# Patient Record
Sex: FEMALE | Race: WHITE | Employment: OTHER | URBAN - METROPOLITAN AREA
[De-identification: names, ages, dates, MRNs, and addresses within clinical notes are randomized per-mention and may not be internally consistent; named-entity substitution may affect disease eponyms.]

---

## 2017-09-22 ENCOUNTER — HOSPITAL ENCOUNTER (OUTPATIENT)
Dept: OTHER | Age: 52
Discharge: HOME | End: 2017-09-22
Attending: PHYSICAL MEDICINE & REHABILITATION | Admitting: PHYSICAL MEDICINE & REHABILITATION

## 2017-09-22 RX ORDER — ATORVASTATIN CALCIUM 80 MG/1
80 TABLET, FILM COATED ORAL NIGHTLY
Status: DISCONTINUED | OUTPATIENT
Start: 2017-09-22 | End: 2017-09-23 | Stop reason: CLARIF

## 2017-09-22 RX ORDER — ALPRAZOLAM 1 MG/1
1 TABLET ORAL 4 TIMES DAILY PRN
Status: DISCONTINUED | OUTPATIENT
Start: 2017-09-22 | End: 2017-09-23 | Stop reason: CLARIF

## 2017-09-22 RX ORDER — ALBUTEROL SULFATE 90 UG/1
2 AEROSOL, METERED RESPIRATORY (INHALATION) EVERY 6 HOURS PRN
Status: DISCONTINUED | OUTPATIENT
Start: 2017-09-22 | End: 2017-09-23 | Stop reason: CLARIF

## 2017-09-22 RX ORDER — ASPIRIN 81 MG/1
81 TABLET, CHEWABLE ORAL DAILY
Status: DISCONTINUED | OUTPATIENT
Start: 2017-09-22 | End: 2017-09-23 | Stop reason: CLARIF

## 2017-09-22 RX ORDER — CLOPIDOGREL BISULFATE 75 MG/1
75 TABLET ORAL DAILY
Status: DISCONTINUED | OUTPATIENT
Start: 2017-09-22 | End: 2017-09-23 | Stop reason: CLARIF

## 2017-09-22 RX ORDER — ACETAMINOPHEN 325 MG/1
650 TABLET ORAL EVERY 4 HOURS PRN
Status: DISCONTINUED | OUTPATIENT
Start: 2017-09-22 | End: 2017-09-23 | Stop reason: CLARIF

## 2017-09-22 RX ORDER — NICOTINE 21 MG/24HR
1 PATCH, TRANSDERMAL 24 HOURS TRANSDERMAL DAILY
Status: DISCONTINUED | OUTPATIENT
Start: 2017-09-22 | End: 2017-09-23 | Stop reason: CLARIF

## 2017-09-22 RX ORDER — HYDROCODONE BITARTRATE AND ACETAMINOPHEN 10; 325 MG/1; MG/1
1 TABLET ORAL EVERY 4 HOURS PRN
Status: DISCONTINUED | OUTPATIENT
Start: 2017-09-22 | End: 2017-09-23 | Stop reason: CLARIF

## 2017-09-22 RX ORDER — ONDANSETRON 8 MG/1
8 TABLET, ORALLY DISINTEGRATING ORAL EVERY 8 HOURS PRN
Status: DISCONTINUED | OUTPATIENT
Start: 2017-09-22 | End: 2017-09-23 | Stop reason: CLARIF

## 2017-10-05 PROBLEM — I63.50 CEREBRAL ARTERY OCCLUSION WITH CEREBRAL INFARCTION (HCC): Status: ACTIVE | Noted: 2017-10-05

## 2017-10-05 PROBLEM — R79.89 DECREASED THYROID STIMULATING HORMONE (TSH) LEVEL: Status: ACTIVE | Noted: 2017-10-05

## 2017-10-05 PROBLEM — G45.8 BRACHIAL-BASILAR INSUFFICIENCY SYNDROME: Status: ACTIVE | Noted: 2017-09-21

## 2017-10-05 PROBLEM — E78.1 HYPERTRIGLYCERIDEMIA: Status: ACTIVE | Noted: 2017-10-05

## 2017-10-05 PROBLEM — R63.0 ANOREXIA: Status: ACTIVE | Noted: 2017-10-05

## 2017-10-05 PROBLEM — M19.90 ARTHRITIS: Status: ACTIVE | Noted: 2017-10-05

## 2017-10-05 PROBLEM — F41.0 EPISODIC PAROXYSMAL ANXIETY DISORDER: Status: ACTIVE | Noted: 2017-10-05

## 2018-12-03 ENCOUNTER — APPOINTMENT (OUTPATIENT)
Dept: CT IMAGING | Age: 53
End: 2018-12-03
Payer: COMMERCIAL

## 2018-12-03 ENCOUNTER — APPOINTMENT (OUTPATIENT)
Dept: GENERAL RADIOLOGY | Age: 53
End: 2018-12-03
Payer: COMMERCIAL

## 2018-12-03 ENCOUNTER — HOSPITAL ENCOUNTER (EMERGENCY)
Age: 53
Discharge: ANOTHER HOSPITAL | End: 2018-12-03
Attending: EMERGENCY MEDICINE
Payer: COMMERCIAL

## 2018-12-03 VITALS
SYSTOLIC BLOOD PRESSURE: 202 MMHG | BODY MASS INDEX: 22.31 KG/M2 | HEART RATE: 82 BPM | DIASTOLIC BLOOD PRESSURE: 125 MMHG | RESPIRATION RATE: 22 BRPM | TEMPERATURE: 98 F | WEIGHT: 120 LBS | OXYGEN SATURATION: 100 %

## 2018-12-03 DIAGNOSIS — G91.9 HYDROCEPHALUS (HCC): ICD-10-CM

## 2018-12-03 DIAGNOSIS — R53.1 LEFT-SIDED WEAKNESS: ICD-10-CM

## 2018-12-03 DIAGNOSIS — I16.0 HYPERTENSIVE URGENCY: ICD-10-CM

## 2018-12-03 DIAGNOSIS — G93.40 ACUTE ENCEPHALOPATHY: Primary | ICD-10-CM

## 2018-12-03 LAB
A/G RATIO: 1 (ref 1.1–2.2)
ALBUMIN SERPL-MCNC: 4 G/DL (ref 3.4–5)
ALP BLD-CCNC: 182 U/L (ref 40–129)
ALT SERPL-CCNC: 14 U/L (ref 10–40)
AMPHETAMINE SCREEN, URINE: ABNORMAL
ANION GAP SERPL CALCULATED.3IONS-SCNC: 16 MMOL/L (ref 3–16)
AST SERPL-CCNC: 22 U/L (ref 15–37)
BACTERIA: ABNORMAL /HPF
BARBITURATE SCREEN URINE: ABNORMAL
BASE EXCESS VENOUS: -0.4 MMOL/L (ref -3–3)
BASOPHILS ABSOLUTE: 0.2 K/UL (ref 0–0.2)
BASOPHILS RELATIVE PERCENT: 0.8 %
BENZODIAZEPINE SCREEN, URINE: POSITIVE
BILIRUB SERPL-MCNC: 0.5 MG/DL (ref 0–1)
BILIRUBIN URINE: NEGATIVE
BLOOD, URINE: ABNORMAL
BUN BLDV-MCNC: 16 MG/DL (ref 7–20)
CALCIUM SERPL-MCNC: 9.7 MG/DL (ref 8.3–10.6)
CANNABINOID SCREEN URINE: POSITIVE
CARBOXYHEMOGLOBIN: 4.3 % (ref 0–1.5)
CHLORIDE BLD-SCNC: 99 MMOL/L (ref 99–110)
CLARITY: CLEAR
CO2: 24 MMOL/L (ref 21–32)
COCAINE METABOLITE SCREEN URINE: ABNORMAL
COLOR: YELLOW
CREAT SERPL-MCNC: 0.6 MG/DL (ref 0.6–1.1)
EKG ATRIAL RATE: 77 BPM
EKG DIAGNOSIS: NORMAL
EKG P AXIS: 78 DEGREES
EKG P-R INTERVAL: 128 MS
EKG Q-T INTERVAL: 424 MS
EKG QRS DURATION: 88 MS
EKG QTC CALCULATION (BAZETT): 479 MS
EKG R AXIS: 85 DEGREES
EKG T AXIS: 84 DEGREES
EKG VENTRICULAR RATE: 77 BPM
EOSINOPHILS ABSOLUTE: 0.7 K/UL (ref 0–0.6)
EOSINOPHILS RELATIVE PERCENT: 2.2 %
GFR AFRICAN AMERICAN: >60
GFR NON-AFRICAN AMERICAN: >60
GLOBULIN: 4.2 G/DL
GLUCOSE BLD-MCNC: 218 MG/DL (ref 70–99)
GLUCOSE URINE: 250 MG/DL
HCO3 VENOUS: 22.9 MMOL/L (ref 23–29)
HCT VFR BLD CALC: 50.9 % (ref 36–48)
HEMOGLOBIN: 16.6 G/DL (ref 12–16)
INR BLD: 1.18 (ref 0.86–1.14)
KETONES, URINE: NEGATIVE MG/DL
LACTIC ACID: 1.6 MMOL/L (ref 0.4–2)
LEUKOCYTE ESTERASE, URINE: NEGATIVE
LYMPHOCYTES ABSOLUTE: 2.4 K/UL (ref 1–5.1)
LYMPHOCYTES RELATIVE PERCENT: 7.6 %
Lab: ABNORMAL
MCH RBC QN AUTO: 28.7 PG (ref 26–34)
MCHC RBC AUTO-ENTMCNC: 32.7 G/DL (ref 31–36)
MCV RBC AUTO: 87.9 FL (ref 80–100)
METHADONE SCREEN, URINE: ABNORMAL
METHEMOGLOBIN VENOUS: 0.4 %
MICROSCOPIC EXAMINATION: YES
MONOCYTES ABSOLUTE: 1.1 K/UL (ref 0–1.3)
MONOCYTES RELATIVE PERCENT: 3.5 %
NEUTROPHILS ABSOLUTE: 26.6 K/UL (ref 1.7–7.7)
NEUTROPHILS RELATIVE PERCENT: 85.9 %
NITRITE, URINE: NEGATIVE
O2 CONTENT, VEN: 23 VOL %
O2 SAT, VEN: 98 %
O2 THERAPY: ABNORMAL
OPIATE SCREEN URINE: POSITIVE
OXYCODONE URINE: ABNORMAL
PCO2, VEN: 34.3 MMHG (ref 40–50)
PDW BLD-RTO: 15 % (ref 12.4–15.4)
PH UA: 8
PH UA: 8
PH VENOUS: 7.44 (ref 7.35–7.45)
PHENCYCLIDINE SCREEN URINE: ABNORMAL
PLATELET # BLD: 252 K/UL (ref 135–450)
PMV BLD AUTO: 9.5 FL (ref 5–10.5)
PO2, VEN: 103.3 MMHG (ref 25–40)
POTASSIUM SERPL-SCNC: 3.4 MMOL/L (ref 3.5–5.1)
PROPOXYPHENE SCREEN: ABNORMAL
PROTEIN UA: >=300 MG/DL
PROTHROMBIN TIME: 13.5 SEC (ref 9.8–13)
RBC # BLD: 5.79 M/UL (ref 4–5.2)
RBC UA: >100 /HPF (ref 0–2)
SODIUM BLD-SCNC: 139 MMOL/L (ref 136–145)
SPECIFIC GRAVITY UA: 1.02
SPECIMEN STATUS: NORMAL
TCO2 CALC VENOUS: 24 MMOL/L
TOTAL PROTEIN: 8.2 G/DL (ref 6.4–8.2)
TROPONIN: <0.01 NG/ML
URINE REFLEX TO CULTURE: YES
URINE TYPE: ABNORMAL
UROBILINOGEN, URINE: 0.2 E.U./DL
WBC # BLD: 31 K/UL (ref 4–11)
WBC UA: ABNORMAL /HPF (ref 0–5)

## 2018-12-03 PROCEDURE — 87077 CULTURE AEROBIC IDENTIFY: CPT

## 2018-12-03 PROCEDURE — 6360000002 HC RX W HCPCS: Performed by: EMERGENCY MEDICINE

## 2018-12-03 PROCEDURE — 82803 BLOOD GASES ANY COMBINATION: CPT

## 2018-12-03 PROCEDURE — 71045 X-RAY EXAM CHEST 1 VIEW: CPT

## 2018-12-03 PROCEDURE — 70498 CT ANGIOGRAPHY NECK: CPT

## 2018-12-03 PROCEDURE — 94002 VENT MGMT INPAT INIT DAY: CPT

## 2018-12-03 PROCEDURE — 2580000003 HC RX 258: Performed by: EMERGENCY MEDICINE

## 2018-12-03 PROCEDURE — 70450 CT HEAD/BRAIN W/O DYE: CPT

## 2018-12-03 PROCEDURE — 93005 ELECTROCARDIOGRAM TRACING: CPT | Performed by: EMERGENCY MEDICINE

## 2018-12-03 PROCEDURE — 2700000000 HC OXYGEN THERAPY PER DAY

## 2018-12-03 PROCEDURE — 96365 THER/PROPH/DIAG IV INF INIT: CPT

## 2018-12-03 PROCEDURE — 6360000004 HC RX CONTRAST MEDICATION: Performed by: EMERGENCY MEDICINE

## 2018-12-03 PROCEDURE — 94761 N-INVAS EAR/PLS OXIMETRY MLT: CPT

## 2018-12-03 PROCEDURE — 83605 ASSAY OF LACTIC ACID: CPT

## 2018-12-03 PROCEDURE — 85025 COMPLETE CBC W/AUTO DIFF WBC: CPT

## 2018-12-03 PROCEDURE — 80053 COMPREHEN METABOLIC PANEL: CPT

## 2018-12-03 PROCEDURE — 80307 DRUG TEST PRSMV CHEM ANLYZR: CPT

## 2018-12-03 PROCEDURE — 84484 ASSAY OF TROPONIN QUANT: CPT

## 2018-12-03 PROCEDURE — 87086 URINE CULTURE/COLONY COUNT: CPT

## 2018-12-03 PROCEDURE — 72125 CT NECK SPINE W/O DYE: CPT

## 2018-12-03 PROCEDURE — 99291 CRITICAL CARE FIRST HOUR: CPT

## 2018-12-03 PROCEDURE — 87186 SC STD MICRODIL/AGAR DIL: CPT

## 2018-12-03 PROCEDURE — 96375 TX/PRO/DX INJ NEW DRUG ADDON: CPT

## 2018-12-03 PROCEDURE — 94770 HC ETCO2 MONITOR DAILY: CPT

## 2018-12-03 PROCEDURE — 70496 CT ANGIOGRAPHY HEAD: CPT

## 2018-12-03 PROCEDURE — 93010 ELECTROCARDIOGRAM REPORT: CPT | Performed by: INTERNAL MEDICINE

## 2018-12-03 PROCEDURE — 81001 URINALYSIS AUTO W/SCOPE: CPT

## 2018-12-03 PROCEDURE — 85610 PROTHROMBIN TIME: CPT

## 2018-12-03 PROCEDURE — 4500000026 HC ED CRITICAL CARE PROCEDURE

## 2018-12-03 PROCEDURE — 2500000003 HC RX 250 WO HCPCS: Performed by: EMERGENCY MEDICINE

## 2018-12-03 RX ORDER — LABETALOL HYDROCHLORIDE 5 MG/ML
10 INJECTION, SOLUTION INTRAVENOUS ONCE
Status: COMPLETED | OUTPATIENT
Start: 2018-12-03 | End: 2018-12-03

## 2018-12-03 RX ORDER — 0.9 % SODIUM CHLORIDE 0.9 %
1000 INTRAVENOUS SOLUTION INTRAVENOUS ONCE
Status: COMPLETED | OUTPATIENT
Start: 2018-12-03 | End: 2018-12-03

## 2018-12-03 RX ORDER — METOPROLOL TARTRATE 5 MG/5ML
10 INJECTION INTRAVENOUS ONCE
Status: COMPLETED | OUTPATIENT
Start: 2018-12-03 | End: 2018-12-03

## 2018-12-03 RX ORDER — ETOMIDATE 2 MG/ML
10 INJECTION INTRAVENOUS ONCE
Status: COMPLETED | OUTPATIENT
Start: 2018-12-03 | End: 2018-12-03

## 2018-12-03 RX ORDER — SUCCINYLCHOLINE CHLORIDE 20 MG/ML
80 INJECTION INTRAMUSCULAR; INTRAVENOUS ONCE
Status: COMPLETED | OUTPATIENT
Start: 2018-12-03 | End: 2018-12-03

## 2018-12-03 RX ADMIN — SUCCINYLCHOLINE CHLORIDE 80 MG: 20 INJECTION, SOLUTION INTRAMUSCULAR; INTRAVENOUS; PARENTERAL at 10:31

## 2018-12-03 RX ADMIN — VANCOMYCIN HYDROCHLORIDE 1000 MG: 1 INJECTION, POWDER, LYOPHILIZED, FOR SOLUTION INTRAVENOUS at 10:45

## 2018-12-03 RX ADMIN — LABETALOL HYDROCHLORIDE 10 MG: 5 INJECTION, SOLUTION INTRAVENOUS at 09:44

## 2018-12-03 RX ADMIN — SODIUM CHLORIDE 1000 ML: 9 INJECTION, SOLUTION INTRAVENOUS at 10:45

## 2018-12-03 RX ADMIN — ETOMIDATE 10 MG: 20 INJECTION, SOLUTION INTRAVENOUS at 10:31

## 2018-12-03 RX ADMIN — METOPROLOL TARTRATE 10 MG: 5 INJECTION, SOLUTION INTRAVENOUS at 11:34

## 2018-12-03 RX ADMIN — IOPAMIDOL 75 ML: 755 INJECTION, SOLUTION INTRAVENOUS at 09:34

## 2018-12-03 ASSESSMENT — PULMONARY FUNCTION TESTS: PIF_VALUE: 16

## 2018-12-03 NOTE — ED PROVIDER NOTES
1b. LOC questions:  1=Performs one task correctly   1c. LOC commands: 0=Performs both tasks correctly   2. Best Gaze: 1=partial gaze palsy   3. Visual: Unable to assess   4. Facial Palsy: 0=Normal symmetric movement   5a. Motor left arm: 3=No effort against gravity, limb falls   5b. Motor right arm: 2=Some effort against gravity, limb cannot get to or maintain (if cured) 90 (or 45) degrees, drifts down to bed, but has some effort against gravity   6a. motor left leg: 3=No effort against gravity, limb falls   6b  Motor right le=Drift, limb holds 90 (or 45) degrees but drifts down before full 10 seconds: does not hit bed   7. Limb Ataxia: Unable to assess   8. Sensory: Unable to assess   9. Best Language:  Unable to assess   10. Dysarthria: 1=Mild to moderate, patient slurs at least some words and at worst, can be understood with some difficulty   11. Extinction and Inattention: 0=No abnormality   12. Distal motor function: 0=Normal    Total:  13     DERMATOLOGIC: No petechiae, rashes, or ecchymoses. No erythema. PSYCH: normal mood and affect. Normal thought content. ED COURSE AND MEDICAL DECISION MAKING:      EKG as interpreted by myself:  normal sinus rhythm with a rate of 77  Axis is   Normal  QTc is  479ms  Intervals and Durations are unremarkable. No specific ST-T wave changes appreciated. No evidence of acute ischemia. No prior EKG    Radiology:  Films have been read by radiologist as noted in chart unless otherwise stated. Other radiologic studies (i.e. CT, MRI, ultrasounds, etc ) have been interpreted by radiologist.     CTA HEAD W CONTRAST   Preliminary Result   There is atherosclerotic disease and near complete occlusion of the left   common carotid artery with a high-grade stenosis at the origin. The left   common carotid artery is irregular and narrowed throughout its visualized   course with a very high-grade stenosis or near occlusion in the region of the   bulb.  There is a very aortic arch. There is atherosclerotic disease of the aortic arch. There is atherosclerotic disease with narrowing of the origin of the left subclavian artery. There appears to be occlusion or near complete occlusion of the left subclavian artery prior to the origin of the left vertebral artery. The right innominate artery is patent. CAROTID ARTERIES: The common carotid artery on the right is patent without any significant narrowing or stenosis. There is mild atherosclerotic calcification in the region of the right internal carotid artery bulb without any significant stenosis using NASCET criteria. The right internal carotid artery is patent through the skull base. There appears to be atherosclerotic disease at the origin of the left common carotid artery with a high-grade stenosis at the origin. The left common carotid artery is tiny throughout its visualized course and narrowed and appears to be near completely occluded in the region of the bulb. There is a very string of flow distal to the bulb that likely represents the distal left internal carotid artery. VERTEBRAL ARTERIES: The vertebral arteries are both patent in their visualized course and large bilaterally without any significant narrowing or stenosis. SOFT TISSUES:  There are emphysematous changes of the lung apices. There is mild spiculation in the left lung apex which may represent scarring. Correlation with prior studies that the patient has might be useful to exclude another etiology. BONES: There are degenerative changes in the cervical spine at C5-6 and C6-7. Intracranial circulation: The distal vertebral arteries and basilar artery are patent. The posterior cerebral arteries are patent without any significant narrowing or stenosis. The distal right internal carotid artery is visualized and patent in its visualized course. The distal left internal carotid artery is faintly visualized and is very narrowed.  The left middle cerebral artery is patent in its visualized course without any obvious narrowing or stenosis. The right middle cerebral artery is patent in its proximal course. There is some tapering of the distal right M3 branches consistent with a previous area of injury or infarct. The anterior cerebral arteries are patent bilaterally. The anterior communicating artery is patent. No obvious aneurysms are identified. The results were called and discussed with Dr. Sobia Keller at the time of interpretation. There is atherosclerotic disease and near complete occlusion of the left common carotid artery with a high-grade stenosis at the origin. The left common carotid artery is irregular and narrowed throughout its visualized course with a very high-grade stenosis or near occlusion in the region of the bulb. There is a very thin faintly visualized internal carotid artery on the left. There is atherosclerotic disease at the origin of the left subclavian artery with a near complete occlusion of the left subclavian prior to the origin of the vertebral artery. The distal left internal carotid artery is very narrowed and faintly visualized its course into the skull base. There is tapering of the distal M3 branches of the right middle cerebral artery which is consistent with the patient's known infarct. No other significant abnormality of the intracranial circulation. Cta Head W Contrast    Result Date: 12/3/2018  EXAMINATION: CTA OF THE NECK; CTA OF THE HEAD WITH CONTRAST 12/3/2018 9:35 am TECHNIQUE: CTA of the neck was performed with the administration of intravenous contrast. Multiplanar reformatted images are provided for review. MIP images are provided for review. Stenosis of the internal carotid arteries measured using NASCET criteria.  Dose modulation, iterative reconstruction, and/or weight based adjustment of the mA/kV was utilized to reduce the radiation dose to as low as reasonably achievable.; CTA of the head/brain was performed with the [place]ugio 23 Not Established VOL %    O2 Therapy Unknown    Protime-INR   Result Value Ref Range    Protime 13.5 (H) 9.8 - 13.0 sec    INR 1.18 (H) 0.86 - 1.14   Urinalysis Reflex to Culture   Result Value Ref Range    Color, UA Yellow Straw/Yellow    Clarity, UA Clear Clear    Glucose, Ur 250 (A) Negative mg/dL    Bilirubin Urine Negative Negative    Ketones, Urine Negative Negative mg/dL    Specific Gravity, UA 1.020 1.005 - 1.030    Blood, Urine LARGE (A) Negative    pH, UA 8.0 5.0 - 8.0    Protein, UA >=300 (A) Negative mg/dL    Urobilinogen, Urine 0.2 <2.0 E.U./dL    Nitrite, Urine Negative Negative    Leukocyte Esterase, Urine Negative Negative    Microscopic Examination YES     Urine Reflex to Culture Yes     Urine Type Not Specified    Lactic Acid, Plasma   Result Value Ref Range    Lactic Acid 1.6 0.4 - 2.0 mmol/L   Sample possible blood bank testing   Result Value Ref Range    Specimen Status [facility]    Urine Drug Screen   Result Value Ref Range    Amphetamine Screen, Urine Neg Negative <1000ng/mL    Barbiturate Screen, Ur Neg Negative <200 ng/mL    Benzodiazepine Screen, Urine POSITIVE (A) Negative <200 ng/mL    Cannabinoid Scrn, Ur POSITIVE (A) Negative <50 ng/mL    Cocaine Metabolite Screen, Urine Neg Negative <300 ng/mL    Opiate Scrn, Ur POSITIVE (A) Negative <300 ng/mL    PCP Screen, Urine Neg Negative <25 ng/mL    Methadone Screen, Urine Neg Negative <300 ng/mL    Propoxyphene Scrn, Ur Neg Negative <300 ng/mL    pH, UA 8.0     Drug Screen Comment: see below     Oxycodone Urine Neg Negative <100 ng/ml   Microscopic Urinalysis   Result Value Ref Range    WBC, UA 10-20 (A) 0 - 5 /HPF    RBC, UA >100 (A) 0 - 2 /HPF    Bacteria, UA 1+ (A) /HPF   EKG 12 Lead   Result Value Ref Range    Ventricular Rate 77 BPM    Atrial Rate 77 BPM    P-R Interval 128 ms    QRS Duration 88 ms    Q-T Interval 424 ms    QTc Calculation (Bazett) 479 ms    P Axis 78 degrees    R Axis 85 degrees    T Axis 84 degrees    Diagnosis Sinus rhythm with marked sinus arrhythmiaOtherwise normal ECGWhen compared with ECG of 10-NOV-2014 07:06,Fusion complexes are no longer Present       Treatment in the department:  Patient received the following while in the ED. Medications   0.9 % sodium chloride bolus (1,000 mLs Intravenous New Bag 12/3/18 1045)   vancomycin 1000 mg IVPB in 250 mL D5W addavial (1,000 mg Intravenous New Bag 12/3/18 1045)   cefepime (MAXIPIME) 2 g IVPB minibag (not administered)   labetalol (NORMODYNE;TRANDATE) injection 10 mg (10 mg Intravenous Given 12/3/18 0944)   iopamidol (ISOVUE-370) 76 % injection 75 mL (75 mLs Intravenous Given 12/3/18 0934)   etomidate (AMIDATE) injection 10 mg (10 mg Intravenous Given 12/3/18 1031)   succinylcholine (ANECTINE) injection 80 mg (80 mg Intravenous Given 12/3/18 1031)           Repeat exam shows Patient now unresponsive to verbal or painful stimuli. Eyes remain open. She has no significant gag reflex. I am concerned that she is not protecting her airway. She is not moving any of her extremities. Decision made to inspect the patient. Intubation Procedure Note    Indication: comatose state    Consent: Unable to be obtained due to patient's condition. Medications Used: etomidate intravenously and succinycholine intravenously    Procedure: The patient was placed in the appropriate position. Cricoid pressure was not required. Intubation was performed by direct laryngoscopy using a laryngoscope and a 7.5 cuffed endotracheal tube. The cuff was then inflated and the tube was secured appropriately at a distance of 21 cm to the dental ridge. Initial confirmation of placement included bilateral breath sounds, an end tidal CO2 detector, adequate chest rise and adequate pulse oximetry reading. A chest x-ray to verify correct placement of the tube showed appropriate tube position. The patient tolerated the procedure well.      Complications: None          Medical decision

## 2018-12-03 NOTE — ED NOTES
Bed: 04  Expected date:   Expected time:   Means of arrival:   Comments:  FILEMON [id number] Win Ruby RN  12/03/18 7734

## 2018-12-03 NOTE — PROGRESS NOTES
12/03/18 1043   Vent Information   $Ventilation $Initial Day   Ventilator Started Yes   Vent Type 840   Vent Mode AC/VC   Vt Ordered 450 mL   Rate Set 15 bmp   Peak Flow 50 L/min   Pressure Support 0 cmH20   FiO2  30 %   Sensitivity 2   PEEP/CPAP 5   Cuff Pressure (cm H2O) 30 cm H2O   Humidification Source HME   Vent Patient Data   Peak Inspiratory Pressure 16 cmH2O   Mean Airway Pressure 9.4 cmH20   Rate Measured 20 br/min   Vt Exhaled 401 mL   Minute Volume 7.11 Liters   I:E Ratio 1:2   Plateau Pressure 14 [id number]   Cough/Sputum   Sputum How Obtained Endotracheal;Suctioned   Cough None;Non-productive   Sputum Amount None   Spontaneous Breathing Trial (SBT) RT Doc   Pulse 92   SpO2 100 %   Breath Sounds   Right Upper Lobe Diminished   Right Middle Lobe Diminished   Right Lower Lobe Diminished   Left Upper Lobe Diminished   Left Lower Lobe Diminished   Additional Respiratory  Assessments   Resp 17   End Tidal CO2 26 (%)   Position Semi-Stovall's   Alarm Settings   High Pressure Alarm 40 cmH2O   Low Minute Volume Alarm 2 L/min   High Respiratory Rate 50 br/min   Low Exhaled Vt  200 mL   Patient Observation   Observations 7.5 ETT, ambu bag @ bedside   ETT (adult)   Placement Date/Time: 12/03/18 1030   Timeout: Patient;Procedure  Preoxygenation: Yes  Mask Ventilation: Ventilated by mask (1)  Technique: Direct laryngoscopy  Type: Cuffed  Tube Size: 7.5 mm  Laryngoscope: Mac  Blade Size: 3  Location: Oral  Insertio. ..    Secured at 21 cm   Measured From Lips   ET Placement Right   Secured By Commercial tube alatorre

## 2018-12-05 LAB
ORGANISM: ABNORMAL
URINE CULTURE, ROUTINE: ABNORMAL
URINE CULTURE, ROUTINE: ABNORMAL

## 2019-01-12 ENCOUNTER — APPOINTMENT (OUTPATIENT)
Dept: GENERAL RADIOLOGY | Age: 54
End: 2019-01-12
Payer: COMMERCIAL

## 2019-01-12 ENCOUNTER — APPOINTMENT (OUTPATIENT)
Dept: CT IMAGING | Age: 54
End: 2019-01-12
Payer: COMMERCIAL

## 2019-01-12 ENCOUNTER — HOSPITAL ENCOUNTER (EMERGENCY)
Age: 54
Discharge: ANOTHER HOSPITAL | End: 2019-01-12
Attending: EMERGENCY MEDICINE
Payer: COMMERCIAL

## 2019-01-12 VITALS
WEIGHT: 119.93 LBS | DIASTOLIC BLOOD PRESSURE: 73 MMHG | BODY MASS INDEX: 22.64 KG/M2 | HEIGHT: 61 IN | HEART RATE: 107 BPM | OXYGEN SATURATION: 98 % | SYSTOLIC BLOOD PRESSURE: 104 MMHG | TEMPERATURE: 99.8 F | RESPIRATION RATE: 18 BRPM

## 2019-01-12 DIAGNOSIS — D72.829 LEUKOCYTOSIS, UNSPECIFIED TYPE: ICD-10-CM

## 2019-01-12 DIAGNOSIS — T83.511A URINARY TRACT INFECTION ASSOCIATED WITH INDWELLING URETHRAL CATHETER, INITIAL ENCOUNTER (HCC): ICD-10-CM

## 2019-01-12 DIAGNOSIS — A41.9 SEPTICEMIA (HCC): Primary | ICD-10-CM

## 2019-01-12 DIAGNOSIS — N39.0 URINARY TRACT INFECTION ASSOCIATED WITH INDWELLING URETHRAL CATHETER, INITIAL ENCOUNTER (HCC): ICD-10-CM

## 2019-01-12 DIAGNOSIS — I71.40 ABDOMINAL AORTIC ANEURYSM (AAA) WITHOUT RUPTURE: ICD-10-CM

## 2019-01-12 DIAGNOSIS — R77.8 ELEVATED TROPONIN: ICD-10-CM

## 2019-01-12 DIAGNOSIS — N17.9 ACUTE RENAL FAILURE, UNSPECIFIED ACUTE RENAL FAILURE TYPE (HCC): ICD-10-CM

## 2019-01-12 LAB
A/G RATIO: 1 (ref 1.1–2.2)
ALBUMIN SERPL-MCNC: 4 G/DL (ref 3.4–5)
ALP BLD-CCNC: 129 U/L (ref 40–129)
ALT SERPL-CCNC: 54 U/L (ref 10–40)
ANION GAP SERPL CALCULATED.3IONS-SCNC: 21 MMOL/L (ref 3–16)
ANISOCYTOSIS: ABNORMAL
APTT: 31 SEC (ref 26–36)
AST SERPL-CCNC: 86 U/L (ref 15–37)
BACTERIA: ABNORMAL /HPF
BASOPHILS ABSOLUTE: 0 K/UL (ref 0–0.2)
BASOPHILS RELATIVE PERCENT: 0 %
BILIRUB SERPL-MCNC: <0.2 MG/DL (ref 0–1)
BILIRUBIN URINE: NEGATIVE
BLOOD, URINE: ABNORMAL
BUN BLDV-MCNC: 68 MG/DL (ref 7–20)
CALCIUM SERPL-MCNC: 10.6 MG/DL (ref 8.3–10.6)
CHLORIDE BLD-SCNC: 99 MMOL/L (ref 99–110)
CLARITY: ABNORMAL
CO2: 21 MMOL/L (ref 21–32)
COLOR: ABNORMAL
CREAT SERPL-MCNC: 2 MG/DL (ref 0.6–1.1)
CRYSTALS, UA: ABNORMAL /HPF
EKG ATRIAL RATE: 132 BPM
EKG DIAGNOSIS: NORMAL
EKG P AXIS: 82 DEGREES
EKG P-R INTERVAL: 132 MS
EKG Q-T INTERVAL: 314 MS
EKG QRS DURATION: 78 MS
EKG QTC CALCULATION (BAZETT): 465 MS
EKG R AXIS: 86 DEGREES
EKG T AXIS: 89 DEGREES
EKG VENTRICULAR RATE: 132 BPM
EOSINOPHILS ABSOLUTE: 0 K/UL (ref 0–0.6)
EOSINOPHILS RELATIVE PERCENT: 0 %
EPITHELIAL CELLS, UA: ABNORMAL /HPF
GFR AFRICAN AMERICAN: 31
GFR NON-AFRICAN AMERICAN: 26
GLOBULIN: 3.9 G/DL
GLUCOSE BLD-MCNC: 136 MG/DL (ref 70–99)
GLUCOSE BLD-MCNC: 180 MG/DL (ref 70–99)
GLUCOSE URINE: NEGATIVE MG/DL
HCT VFR BLD CALC: 35.9 % (ref 36–48)
HEMOGLOBIN: 11 G/DL (ref 12–16)
HYPOCHROMIA: ABNORMAL
INR BLD: 1.68 (ref 0.86–1.14)
KETONES, URINE: ABNORMAL MG/DL
LACTIC ACID, SEPSIS: 2.6 MMOL/L (ref 0.4–1.9)
LACTIC ACID: 3.9 MMOL/L (ref 0.4–2)
LEUKOCYTE ESTERASE, URINE: ABNORMAL
LYMPHOCYTES ABSOLUTE: 2.9 K/UL (ref 1–5.1)
LYMPHOCYTES RELATIVE PERCENT: 6 %
MCH RBC QN AUTO: 26.8 PG (ref 26–34)
MCHC RBC AUTO-ENTMCNC: 30.7 G/DL (ref 31–36)
MCV RBC AUTO: 87.4 FL (ref 80–100)
MICROSCOPIC EXAMINATION: YES
MONOCYTES ABSOLUTE: 1 K/UL (ref 0–1.3)
MONOCYTES RELATIVE PERCENT: 2 %
MUCUS: ABNORMAL /LPF
NEUTROPHILS ABSOLUTE: 44.1 K/UL (ref 1.7–7.7)
NEUTROPHILS RELATIVE PERCENT: 92 %
NITRITE, URINE: NEGATIVE
NUCLEATED RED BLOOD CELLS: 2 /100 WBC
OCCULT BLOOD DIAGNOSTIC: NORMAL
PDW BLD-RTO: 20.5 % (ref 12.4–15.4)
PERFORMED ON: ABNORMAL
PH UA: 5
PLATELET # BLD: 415 K/UL (ref 135–450)
PLATELET SLIDE REVIEW: ADEQUATE
PMV BLD AUTO: 9.7 FL (ref 5–10.5)
POIKILOCYTES: ABNORMAL
POLYCHROMASIA: ABNORMAL
POTASSIUM REFLEX MAGNESIUM: 5.7 MMOL/L (ref 3.5–5.1)
PRO-BNP: ABNORMAL PG/ML (ref 0–124)
PROTEIN UA: 100 MG/DL
PROTHROMBIN TIME: 19.1 SEC (ref 9.8–13)
RBC # BLD: 4.11 M/UL (ref 4–5.2)
RBC UA: >100 /HPF (ref 0–2)
SLIDE REVIEW: ABNORMAL
SODIUM BLD-SCNC: 141 MMOL/L (ref 136–145)
SPECIFIC GRAVITY UA: >=1.03
TOTAL PROTEIN: 7.9 G/DL (ref 6.4–8.2)
TROPONIN: 0.24 NG/ML
URINE REFLEX TO CULTURE: YES
URINE TYPE: ABNORMAL
UROBILINOGEN, URINE: 0.2 E.U./DL
WBC # BLD: 47.9 K/UL (ref 4–11)
WBC UA: ABNORMAL /HPF (ref 0–5)
YEAST: PRESENT /HPF

## 2019-01-12 PROCEDURE — 85730 THROMBOPLASTIN TIME PARTIAL: CPT

## 2019-01-12 PROCEDURE — 74018 RADEX ABDOMEN 1 VIEW: CPT

## 2019-01-12 PROCEDURE — 87040 BLOOD CULTURE FOR BACTERIA: CPT

## 2019-01-12 PROCEDURE — G0328 FECAL BLOOD SCRN IMMUNOASSAY: HCPCS

## 2019-01-12 PROCEDURE — 93005 ELECTROCARDIOGRAM TRACING: CPT | Performed by: PHYSICIAN ASSISTANT

## 2019-01-12 PROCEDURE — 96368 THER/DIAG CONCURRENT INF: CPT

## 2019-01-12 PROCEDURE — 36415 COLL VENOUS BLD VENIPUNCTURE: CPT

## 2019-01-12 PROCEDURE — 71045 X-RAY EXAM CHEST 1 VIEW: CPT

## 2019-01-12 PROCEDURE — 6360000002 HC RX W HCPCS: Performed by: EMERGENCY MEDICINE

## 2019-01-12 PROCEDURE — 96365 THER/PROPH/DIAG IV INF INIT: CPT

## 2019-01-12 PROCEDURE — 80053 COMPREHEN METABOLIC PANEL: CPT

## 2019-01-12 PROCEDURE — 6370000000 HC RX 637 (ALT 250 FOR IP): Performed by: PHYSICIAN ASSISTANT

## 2019-01-12 PROCEDURE — 85610 PROTHROMBIN TIME: CPT

## 2019-01-12 PROCEDURE — 6360000002 HC RX W HCPCS: Performed by: PHYSICIAN ASSISTANT

## 2019-01-12 PROCEDURE — 96376 TX/PRO/DX INJ SAME DRUG ADON: CPT

## 2019-01-12 PROCEDURE — 2580000003 HC RX 258: Performed by: EMERGENCY MEDICINE

## 2019-01-12 PROCEDURE — C9113 INJ PANTOPRAZOLE SODIUM, VIA: HCPCS | Performed by: PHYSICIAN ASSISTANT

## 2019-01-12 PROCEDURE — 87086 URINE CULTURE/COLONY COUNT: CPT

## 2019-01-12 PROCEDURE — 99291 CRITICAL CARE FIRST HOUR: CPT

## 2019-01-12 PROCEDURE — 84484 ASSAY OF TROPONIN QUANT: CPT

## 2019-01-12 PROCEDURE — 70450 CT HEAD/BRAIN W/O DYE: CPT

## 2019-01-12 PROCEDURE — 83880 ASSAY OF NATRIURETIC PEPTIDE: CPT

## 2019-01-12 PROCEDURE — 74176 CT ABD & PELVIS W/O CONTRAST: CPT

## 2019-01-12 PROCEDURE — 85025 COMPLETE CBC W/AUTO DIFF WBC: CPT

## 2019-01-12 PROCEDURE — 6360000004 HC RX CONTRAST MEDICATION: Performed by: PHYSICIAN ASSISTANT

## 2019-01-12 PROCEDURE — 99292 CRITICAL CARE ADDL 30 MIN: CPT

## 2019-01-12 PROCEDURE — 96375 TX/PRO/DX INJ NEW DRUG ADDON: CPT

## 2019-01-12 PROCEDURE — 93010 ELECTROCARDIOGRAM REPORT: CPT | Performed by: INTERNAL MEDICINE

## 2019-01-12 PROCEDURE — 81001 URINALYSIS AUTO W/SCOPE: CPT

## 2019-01-12 PROCEDURE — 96372 THER/PROPH/DIAG INJ SC/IM: CPT

## 2019-01-12 PROCEDURE — 96366 THER/PROPH/DIAG IV INF ADDON: CPT

## 2019-01-12 PROCEDURE — 83605 ASSAY OF LACTIC ACID: CPT

## 2019-01-12 PROCEDURE — 2580000003 HC RX 258: Performed by: PHYSICIAN ASSISTANT

## 2019-01-12 RX ORDER — ACETAMINOPHEN 500 MG
1000 TABLET ORAL ONCE
Status: COMPLETED | OUTPATIENT
Start: 2019-01-12 | End: 2019-01-12

## 2019-01-12 RX ORDER — HALOPERIDOL 5 MG/ML
5 INJECTION INTRAMUSCULAR ONCE
Status: COMPLETED | OUTPATIENT
Start: 2019-01-12 | End: 2019-01-12

## 2019-01-12 RX ORDER — PREDNISONE 50 MG/1
50 TABLET ORAL DAILY
COMMUNITY
End: 2019-02-18

## 2019-01-12 RX ORDER — 0.9 % SODIUM CHLORIDE 0.9 %
30 INTRAVENOUS SOLUTION INTRAVENOUS ONCE
Status: COMPLETED | OUTPATIENT
Start: 2019-01-12 | End: 2019-01-12

## 2019-01-12 RX ORDER — QUETIAPINE FUMARATE 25 MG/1
25 TABLET, FILM COATED ORAL NIGHTLY
COMMUNITY

## 2019-01-12 RX ORDER — LACOSAMIDE 50 MG/1
100 TABLET ORAL 2 TIMES DAILY
COMMUNITY

## 2019-01-12 RX ORDER — LEVETIRACETAM 10 MG/ML
1000 INJECTION INTRAVASCULAR 2 TIMES DAILY
COMMUNITY
End: 2019-02-18

## 2019-01-12 RX ORDER — AMANTADINE HYDROCHLORIDE 50 MG/5ML
100 SOLUTION ORAL EVERY 12 HOURS
COMMUNITY

## 2019-01-12 RX ORDER — TIZANIDINE 2 MG/1
2 TABLET ORAL EVERY 6 HOURS PRN
COMMUNITY
End: 2019-02-18

## 2019-01-12 RX ORDER — ALBUTEROL SULFATE 90 UG/1
2 AEROSOL, METERED RESPIRATORY (INHALATION) EVERY 6 HOURS PRN
COMMUNITY
End: 2019-04-06

## 2019-01-12 RX ORDER — SODIUM CHLORIDE 0.9 % (FLUSH) 0.9 %
10 SYRINGE (ML) INJECTION EVERY 12 HOURS SCHEDULED
Status: DISCONTINUED | OUTPATIENT
Start: 2019-01-12 | End: 2019-01-12 | Stop reason: HOSPADM

## 2019-01-12 RX ORDER — SODIUM CHLORIDE 0.9 % (FLUSH) 0.9 %
10 SYRINGE (ML) INJECTION PRN
Status: DISCONTINUED | OUTPATIENT
Start: 2019-01-12 | End: 2019-01-12 | Stop reason: HOSPADM

## 2019-01-12 RX ORDER — FOLIC ACID 1 MG/1
1 TABLET ORAL DAILY
COMMUNITY
End: 2019-04-27

## 2019-01-12 RX ORDER — PANTOPRAZOLE SODIUM 40 MG/1
40 GRANULE, DELAYED RELEASE ORAL
COMMUNITY
End: 2019-02-18

## 2019-01-12 RX ORDER — SULFAMETHOXAZOLE AND TRIMETHOPRIM 200; 40 MG/5ML; MG/5ML
160 SUSPENSION ORAL
Status: ON HOLD | COMMUNITY
End: 2019-04-16 | Stop reason: HOSPADM

## 2019-01-12 RX ORDER — MORPHINE SULFATE 4 MG/ML
2 INJECTION, SOLUTION INTRAMUSCULAR; INTRAVENOUS ONCE
Status: COMPLETED | OUTPATIENT
Start: 2019-01-12 | End: 2019-01-12

## 2019-01-12 RX ORDER — PREDNISONE 20 MG/1
20 TABLET ORAL DAILY
COMMUNITY
End: 2019-02-18

## 2019-01-12 RX ORDER — MORPHINE SULFATE 4 MG/ML
2 INJECTION, SOLUTION INTRAMUSCULAR; INTRAVENOUS ONCE
Status: DISCONTINUED | OUTPATIENT
Start: 2019-01-12 | End: 2019-01-12 | Stop reason: HOSPADM

## 2019-01-12 RX ORDER — 0.9 % SODIUM CHLORIDE 0.9 %
30 INTRAVENOUS SOLUTION INTRAVENOUS ONCE
Status: DISCONTINUED | OUTPATIENT
Start: 2019-01-12 | End: 2019-01-12

## 2019-01-12 RX ORDER — MICONAZOLE
POWDER (GRAM) MISCELLANEOUS
COMMUNITY
End: 2019-02-18

## 2019-01-12 RX ORDER — BACLOFEN 20 MG/1
20 TABLET ORAL
Status: ON HOLD | COMMUNITY
End: 2019-04-16 | Stop reason: HOSPADM

## 2019-01-12 RX ORDER — OLANZAPINE 2.5 MG/1
2.5 TABLET ORAL EVERY 12 HOURS
COMMUNITY

## 2019-01-12 RX ORDER — LANOLIN ALCOHOL/MO/W.PET/CERES
3 CREAM (GRAM) TOPICAL DAILY
Status: ON HOLD | COMMUNITY
End: 2019-04-16 | Stop reason: HOSPADM

## 2019-01-12 RX ORDER — FLUOXETINE HYDROCHLORIDE 20 MG/5ML
LIQUID ORAL DAILY
COMMUNITY

## 2019-01-12 RX ORDER — METHOTREXATE 25 MG/ML
20 INJECTION, SOLUTION INTRA-ARTERIAL; INTRAMUSCULAR; INTRAVENOUS WEEKLY
Status: ON HOLD | COMMUNITY
End: 2019-04-16 | Stop reason: HOSPADM

## 2019-01-12 RX ORDER — IPRATROPIUM BROMIDE AND ALBUTEROL SULFATE 2.5; .5 MG/3ML; MG/3ML
1 SOLUTION RESPIRATORY (INHALATION) EVERY 4 HOURS
COMMUNITY
End: 2019-02-18

## 2019-01-12 RX ORDER — OXYCODONE HYDROCHLORIDE 5 MG/1
5 TABLET ORAL EVERY 4 HOURS PRN
COMMUNITY
End: 2019-02-18

## 2019-01-12 RX ADMIN — MORPHINE SULFATE 2 MG: 4 INJECTION INTRAVENOUS at 13:46

## 2019-01-12 RX ADMIN — SODIUM CHLORIDE 80 MG: 9 INJECTION, SOLUTION INTRAVENOUS at 13:42

## 2019-01-12 RX ADMIN — PIPERACILLIN SODIUM AND TAZOBACTAM SODIUM 3.38 G: 3; .375 INJECTION, POWDER, LYOPHILIZED, FOR SOLUTION INTRAVENOUS at 15:16

## 2019-01-12 RX ADMIN — SODIUM CHLORIDE 8 MG/HR: 9 INJECTION, SOLUTION INTRAVENOUS at 14:18

## 2019-01-12 RX ADMIN — MORPHINE SULFATE 2 MG: 4 INJECTION INTRAVENOUS at 15:54

## 2019-01-12 RX ADMIN — IOHEXOL 50 ML: 240 INJECTION, SOLUTION INTRATHECAL; INTRAVASCULAR; INTRAVENOUS; ORAL at 15:07

## 2019-01-12 RX ADMIN — HALOPERIDOL LACTATE 5 MG: 5 INJECTION, SOLUTION INTRAMUSCULAR at 14:54

## 2019-01-12 RX ADMIN — ACETAMINOPHEN 1000 MG: 500 TABLET ORAL at 13:53

## 2019-01-12 RX ADMIN — SODIUM CHLORIDE 1632 ML: 9 INJECTION, SOLUTION INTRAVENOUS at 13:41

## 2019-01-12 RX ADMIN — HALOPERIDOL LACTATE 5 MG: 5 INJECTION, SOLUTION INTRAMUSCULAR at 17:45

## 2019-01-12 ASSESSMENT — PAIN SCALES - GENERAL
PAINLEVEL_OUTOF10: 8
PAINLEVEL_OUTOF10: 5
PAINLEVEL_OUTOF10: 8
PAINLEVEL_OUTOF10: 8

## 2019-01-14 LAB
ORGANISM: ABNORMAL
URINE CULTURE, ROUTINE: ABNORMAL
URINE CULTURE, ROUTINE: ABNORMAL

## 2019-01-17 LAB
BLOOD CULTURE, ROUTINE: NORMAL
CULTURE, BLOOD 2: NORMAL

## 2019-02-18 ENCOUNTER — APPOINTMENT (OUTPATIENT)
Dept: GENERAL RADIOLOGY | Age: 54
End: 2019-02-18
Payer: COMMERCIAL

## 2019-02-18 ENCOUNTER — APPOINTMENT (OUTPATIENT)
Dept: CT IMAGING | Age: 54
End: 2019-02-18
Payer: COMMERCIAL

## 2019-02-18 ENCOUNTER — HOSPITAL ENCOUNTER (EMERGENCY)
Age: 54
Discharge: OTHER INSTITUTION | End: 2019-02-18
Attending: EMERGENCY MEDICINE
Payer: COMMERCIAL

## 2019-02-18 VITALS
HEIGHT: 60 IN | TEMPERATURE: 97.8 F | SYSTOLIC BLOOD PRESSURE: 101 MMHG | BODY MASS INDEX: 23.36 KG/M2 | WEIGHT: 119 LBS | HEART RATE: 94 BPM | RESPIRATION RATE: 12 BRPM | DIASTOLIC BLOOD PRESSURE: 72 MMHG | OXYGEN SATURATION: 99 %

## 2019-02-18 DIAGNOSIS — K52.9 COLITIS, ACUTE: Primary | ICD-10-CM

## 2019-02-18 DIAGNOSIS — I74.09 CHRONIC DISTAL AORTIC OCCLUSION (HCC): ICD-10-CM

## 2019-02-18 DIAGNOSIS — J18.9 PNEUMONIA OF RIGHT LOWER LOBE DUE TO INFECTIOUS ORGANISM: ICD-10-CM

## 2019-02-18 DIAGNOSIS — A41.9 SEPTICEMIA (HCC): ICD-10-CM

## 2019-02-18 DIAGNOSIS — N28.0 RENAL INFARCT (HCC): ICD-10-CM

## 2019-02-18 LAB
A/G RATIO: 0.8 (ref 1.1–2.2)
ALBUMIN SERPL-MCNC: 3.4 G/DL (ref 3.4–5)
ALP BLD-CCNC: 108 U/L (ref 40–129)
ALT SERPL-CCNC: 36 U/L (ref 10–40)
ANION GAP SERPL CALCULATED.3IONS-SCNC: 14 MMOL/L (ref 3–16)
ANISOCYTOSIS: ABNORMAL
AST SERPL-CCNC: 26 U/L (ref 15–37)
BACTERIA: ABNORMAL /HPF
BANDED NEUTROPHILS RELATIVE PERCENT: 2 % (ref 0–7)
BASOPHILS ABSOLUTE: 0 K/UL (ref 0–0.2)
BASOPHILS RELATIVE PERCENT: 0 %
BILIRUB SERPL-MCNC: <0.2 MG/DL (ref 0–1)
BILIRUBIN URINE: NEGATIVE
BLOOD, URINE: ABNORMAL
BUN BLDV-MCNC: 36 MG/DL (ref 7–20)
CALCIUM SERPL-MCNC: 10.1 MG/DL (ref 8.3–10.6)
CHLORIDE BLD-SCNC: 98 MMOL/L (ref 99–110)
CLARITY: ABNORMAL
CO2: 28 MMOL/L (ref 21–32)
COLOR: ABNORMAL
COMMENT UA: ABNORMAL
CREAT SERPL-MCNC: 0.7 MG/DL (ref 0.6–1.1)
EOSINOPHILS ABSOLUTE: 0 K/UL (ref 0–0.6)
EOSINOPHILS RELATIVE PERCENT: 0 %
GFR AFRICAN AMERICAN: >60
GFR NON-AFRICAN AMERICAN: >60
GLOBULIN: 4.1 G/DL
GLUCOSE BLD-MCNC: 85 MG/DL (ref 70–99)
GLUCOSE URINE: NEGATIVE MG/DL
HCT VFR BLD CALC: 31.9 % (ref 36–48)
HEMOGLOBIN: 9.8 G/DL (ref 12–16)
KETONES, URINE: NEGATIVE MG/DL
LACTIC ACID, SEPSIS: 4.2 MMOL/L (ref 0.4–1.9)
LEUKOCYTE ESTERASE, URINE: ABNORMAL
LIPASE: 19 U/L (ref 13–60)
LYMPHOCYTES ABSOLUTE: 2.6 K/UL (ref 1–5.1)
LYMPHOCYTES RELATIVE PERCENT: 13 %
MCH RBC QN AUTO: 26.9 PG (ref 26–34)
MCHC RBC AUTO-ENTMCNC: 30.8 G/DL (ref 31–36)
MCV RBC AUTO: 87.4 FL (ref 80–100)
MICROSCOPIC EXAMINATION: YES
MONOCYTES ABSOLUTE: 1 K/UL (ref 0–1.3)
MONOCYTES RELATIVE PERCENT: 5 %
NEUTROPHILS ABSOLUTE: 16.2 K/UL (ref 1.7–7.7)
NEUTROPHILS RELATIVE PERCENT: 80 %
NITRITE, URINE: NEGATIVE
PDW BLD-RTO: 20.1 % (ref 12.4–15.4)
PH UA: 6
PLATELET # BLD: 300 K/UL (ref 135–450)
PLATELET SLIDE REVIEW: ADEQUATE
PMV BLD AUTO: 9.5 FL (ref 5–10.5)
POLYCHROMASIA: ABNORMAL
POTASSIUM SERPL-SCNC: 5.3 MMOL/L (ref 3.5–5.1)
PROTEIN UA: >=300 MG/DL
RBC # BLD: 3.65 M/UL (ref 4–5.2)
RBC UA: >100 /HPF (ref 0–2)
SLIDE REVIEW: ABNORMAL
SODIUM BLD-SCNC: 140 MMOL/L (ref 136–145)
SPECIFIC GRAVITY UA: 1.02
TOTAL PROTEIN: 7.5 G/DL (ref 6.4–8.2)
URINE TYPE: ABNORMAL
UROBILINOGEN, URINE: 0.2 E.U./DL
VACUOLATED NEUTROPHILS: PRESENT
WBC # BLD: 19.7 K/UL (ref 4–11)
WBC UA: ABNORMAL /HPF (ref 0–5)
YEAST: PRESENT /HPF

## 2019-02-18 PROCEDURE — 71045 X-RAY EXAM CHEST 1 VIEW: CPT

## 2019-02-18 PROCEDURE — 96375 TX/PRO/DX INJ NEW DRUG ADDON: CPT

## 2019-02-18 PROCEDURE — 93005 ELECTROCARDIOGRAM TRACING: CPT | Performed by: EMERGENCY MEDICINE

## 2019-02-18 PROCEDURE — 96365 THER/PROPH/DIAG IV INF INIT: CPT

## 2019-02-18 PROCEDURE — 85025 COMPLETE CBC W/AUTO DIFF WBC: CPT

## 2019-02-18 PROCEDURE — 99285 EMERGENCY DEPT VISIT HI MDM: CPT

## 2019-02-18 PROCEDURE — 6360000004 HC RX CONTRAST MEDICATION: Performed by: EMERGENCY MEDICINE

## 2019-02-18 PROCEDURE — 96361 HYDRATE IV INFUSION ADD-ON: CPT

## 2019-02-18 PROCEDURE — 74177 CT ABD & PELVIS W/CONTRAST: CPT

## 2019-02-18 PROCEDURE — 87040 BLOOD CULTURE FOR BACTERIA: CPT

## 2019-02-18 PROCEDURE — 81001 URINALYSIS AUTO W/SCOPE: CPT

## 2019-02-18 PROCEDURE — 80053 COMPREHEN METABOLIC PANEL: CPT

## 2019-02-18 PROCEDURE — 6360000002 HC RX W HCPCS: Performed by: EMERGENCY MEDICINE

## 2019-02-18 PROCEDURE — 51702 INSERT TEMP BLADDER CATH: CPT

## 2019-02-18 PROCEDURE — 96367 TX/PROPH/DG ADDL SEQ IV INF: CPT

## 2019-02-18 PROCEDURE — 83605 ASSAY OF LACTIC ACID: CPT

## 2019-02-18 PROCEDURE — 93010 ELECTROCARDIOGRAM REPORT: CPT | Performed by: INTERNAL MEDICINE

## 2019-02-18 PROCEDURE — 83690 ASSAY OF LIPASE: CPT

## 2019-02-18 PROCEDURE — 2580000003 HC RX 258: Performed by: EMERGENCY MEDICINE

## 2019-02-18 RX ORDER — OXYCODONE HYDROCHLORIDE 5 MG/1
5 TABLET ORAL EVERY 4 HOURS PRN
COMMUNITY
End: 2019-04-06

## 2019-02-18 RX ORDER — 0.9 % SODIUM CHLORIDE 0.9 %
30 INTRAVENOUS SOLUTION INTRAVENOUS ONCE
Status: COMPLETED | OUTPATIENT
Start: 2019-02-18 | End: 2019-02-18

## 2019-02-18 RX ORDER — GABAPENTIN 100 MG/1
CAPSULE ORAL EVERY 8 HOURS
Status: ON HOLD | COMMUNITY
End: 2019-04-16 | Stop reason: HOSPADM

## 2019-02-18 RX ORDER — DOCUSATE SODIUM 100 MG/1
100 CAPSULE, LIQUID FILLED ORAL 2 TIMES DAILY
Status: ON HOLD | COMMUNITY
End: 2019-04-16 | Stop reason: HOSPADM

## 2019-02-18 RX ORDER — ESOMEPRAZOLE MAGNESIUM 40 MG/1
40 FOR SUSPENSION ORAL DAILY
COMMUNITY

## 2019-02-18 RX ORDER — HYDROMORPHONE HCL 110MG/55ML
0.5 PATIENT CONTROLLED ANALGESIA SYRINGE INTRAVENOUS ONCE
Status: COMPLETED | OUTPATIENT
Start: 2019-02-18 | End: 2019-02-18

## 2019-02-18 RX ORDER — LEVETIRACETAM 1000 MG/1
1000 TABLET ORAL 2 TIMES DAILY
COMMUNITY

## 2019-02-18 RX ORDER — SENNA AND DOCUSATE SODIUM 50; 8.6 MG/1; MG/1
1 TABLET, FILM COATED ORAL 2 TIMES DAILY
Status: ON HOLD | COMMUNITY
End: 2019-04-16 | Stop reason: HOSPADM

## 2019-02-18 RX ORDER — TIZANIDINE 2 MG/1
2 TABLET ORAL 3 TIMES DAILY
COMMUNITY

## 2019-02-18 RX ORDER — PREDNISONE 5 MG/ML
30 SOLUTION ORAL DAILY
COMMUNITY

## 2019-02-18 RX ORDER — ACETAMINOPHEN 325 MG/1
650 TABLET ORAL EVERY 6 HOURS PRN
COMMUNITY

## 2019-02-18 RX ORDER — FUROSEMIDE 10 MG/ML
40 INJECTION INTRAMUSCULAR; INTRAVENOUS ONCE
Status: DISCONTINUED | OUTPATIENT
Start: 2019-02-18 | End: 2019-02-18 | Stop reason: HOSPADM

## 2019-02-18 RX ORDER — AMLODIPINE BESYLATE 10 MG/1
10 TABLET ORAL DAILY
COMMUNITY
End: 2019-04-06

## 2019-02-18 RX ORDER — M-VIT,TX,IRON,MINS/CALC/FOLIC 27MG-0.4MG
1 TABLET ORAL DAILY
COMMUNITY

## 2019-02-18 RX ORDER — ONDANSETRON 2 MG/ML
4 INJECTION INTRAMUSCULAR; INTRAVENOUS ONCE
Status: COMPLETED | OUTPATIENT
Start: 2019-02-18 | End: 2019-02-18

## 2019-02-18 RX ORDER — ASCORBIC ACID 500 MG
500 TABLET ORAL 2 TIMES DAILY
COMMUNITY

## 2019-02-18 RX ORDER — LABETALOL 100 MG/1
100 TABLET, FILM COATED ORAL 2 TIMES DAILY
COMMUNITY
End: 2019-04-06

## 2019-02-18 RX ADMIN — PIPERACILLIN SODIUM AND TAZOBACTAM SODIUM 3.38 G: 3; .375 INJECTION, POWDER, LYOPHILIZED, FOR SOLUTION INTRAVENOUS at 17:45

## 2019-02-18 RX ADMIN — ONDANSETRON 4 MG: 2 INJECTION INTRAMUSCULAR; INTRAVENOUS at 17:47

## 2019-02-18 RX ADMIN — VANCOMYCIN HYDROCHLORIDE 1000 MG: 1 INJECTION, POWDER, LYOPHILIZED, FOR SOLUTION INTRAVENOUS at 18:28

## 2019-02-18 RX ADMIN — IOPAMIDOL 75 ML: 755 INJECTION, SOLUTION INTRAVENOUS at 18:37

## 2019-02-18 RX ADMIN — HYDROMORPHONE HYDROCHLORIDE 0.5 MG: 2 INJECTION INTRAMUSCULAR; INTRAVENOUS; SUBCUTANEOUS at 17:44

## 2019-02-18 RX ADMIN — SODIUM CHLORIDE 1620 ML: 9 INJECTION, SOLUTION INTRAVENOUS at 18:29

## 2019-02-18 ASSESSMENT — ENCOUNTER SYMPTOMS
SORE THROAT: 0
ABDOMINAL PAIN: 1
VOMITING: 0
EYE DISCHARGE: 0
ABDOMINAL DISTENTION: 1
DIARRHEA: 0
NAUSEA: 0
COUGH: 0
BACK PAIN: 0

## 2019-02-18 ASSESSMENT — PAIN DESCRIPTION - PAIN TYPE: TYPE: ACUTE PAIN

## 2019-02-18 ASSESSMENT — PAIN SCALES - GENERAL
PAINLEVEL_OUTOF10: 5
PAINLEVEL_OUTOF10: 5

## 2019-02-18 ASSESSMENT — PAIN DESCRIPTION - LOCATION: LOCATION: ABDOMEN

## 2019-02-18 ASSESSMENT — PAIN DESCRIPTION - ORIENTATION: ORIENTATION: MID

## 2019-02-19 LAB
EKG ATRIAL RATE: 103 BPM
EKG DIAGNOSIS: NORMAL
EKG P-R INTERVAL: 136 MS
EKG Q-T INTERVAL: 346 MS
EKG QRS DURATION: 68 MS
EKG QTC CALCULATION (BAZETT): 453 MS
EKG R AXIS: -29 DEGREES
EKG T AXIS: -27 DEGREES
EKG VENTRICULAR RATE: 103 BPM

## 2019-02-23 LAB
BLOOD CULTURE, ROUTINE: NORMAL
CULTURE, BLOOD 2: NORMAL

## 2019-04-06 ENCOUNTER — HOSPITAL ENCOUNTER (INPATIENT)
Age: 54
LOS: 10 days | Discharge: NURSING, REHAB OR LONG-TERM CARE | DRG: 720 | End: 2019-04-16
Attending: INTERNAL MEDICINE | Admitting: INTERNAL MEDICINE
Payer: COMMERCIAL

## 2019-04-06 ENCOUNTER — APPOINTMENT (OUTPATIENT)
Dept: CT IMAGING | Age: 54
End: 2019-04-06
Payer: COMMERCIAL

## 2019-04-06 ENCOUNTER — APPOINTMENT (OUTPATIENT)
Dept: GENERAL RADIOLOGY | Age: 54
End: 2019-04-06
Payer: COMMERCIAL

## 2019-04-06 ENCOUNTER — HOSPITAL ENCOUNTER (EMERGENCY)
Age: 54
Discharge: ANOTHER HOSPITAL | End: 2019-04-06
Attending: EMERGENCY MEDICINE
Payer: COMMERCIAL

## 2019-04-06 VITALS
OXYGEN SATURATION: 96 % | TEMPERATURE: 98.5 F | SYSTOLIC BLOOD PRESSURE: 106 MMHG | RESPIRATION RATE: 14 BRPM | BODY MASS INDEX: 23.24 KG/M2 | HEART RATE: 110 BPM | DIASTOLIC BLOOD PRESSURE: 75 MMHG | WEIGHT: 119 LBS

## 2019-04-06 DIAGNOSIS — D72.829 LEUKOCYTOSIS, UNSPECIFIED TYPE: ICD-10-CM

## 2019-04-06 DIAGNOSIS — L89.154 PRESSURE INJURY OF SACRAL REGION, STAGE 4 (HCC): Primary | ICD-10-CM

## 2019-04-06 DIAGNOSIS — I47.1 PAROXYSMAL SUPRAVENTRICULAR TACHYCARDIA (HCC): Primary | ICD-10-CM

## 2019-04-06 DIAGNOSIS — R56.9 SEIZURE (HCC): ICD-10-CM

## 2019-04-06 DIAGNOSIS — R77.8 ELEVATED TROPONIN: ICD-10-CM

## 2019-04-06 LAB
A/G RATIO: 1.3 (ref 1.1–2.2)
ALBUMIN SERPL-MCNC: 4.1 G/DL (ref 3.4–5)
ALP BLD-CCNC: 148 U/L (ref 40–129)
ALT SERPL-CCNC: 32 U/L (ref 10–40)
ANION GAP SERPL CALCULATED.3IONS-SCNC: 24 MMOL/L (ref 3–16)
ANISOCYTOSIS: ABNORMAL
APTT: 26.2 SEC (ref 26–36)
AST SERPL-CCNC: 18 U/L (ref 15–37)
BACTERIA: ABNORMAL /HPF
BANDED NEUTROPHILS RELATIVE PERCENT: 1 % (ref 0–7)
BASOPHILS ABSOLUTE: 0 K/UL (ref 0–0.2)
BASOPHILS RELATIVE PERCENT: 0 %
BILIRUB SERPL-MCNC: <0.2 MG/DL (ref 0–1)
BILIRUBIN URINE: NEGATIVE
BLOOD, URINE: ABNORMAL
BUN BLDV-MCNC: 26 MG/DL (ref 7–20)
CALCIUM SERPL-MCNC: 9.9 MG/DL (ref 8.3–10.6)
CHLORIDE BLD-SCNC: 99 MMOL/L (ref 99–110)
CLARITY: ABNORMAL
CO2: 21 MMOL/L (ref 21–32)
COLOR: YELLOW
COMMENT UA: ABNORMAL
CREAT SERPL-MCNC: 0.9 MG/DL (ref 0.6–1.1)
EKG ATRIAL RATE: 153 BPM
EKG ATRIAL RATE: 157 BPM
EKG DIAGNOSIS: NORMAL
EKG DIAGNOSIS: NORMAL
EKG P AXIS: 45 DEGREES
EKG P-R INTERVAL: 102 MS
EKG Q-T INTERVAL: 280 MS
EKG Q-T INTERVAL: 308 MS
EKG QRS DURATION: 86 MS
EKG QRS DURATION: 90 MS
EKG QTC CALCULATION (BAZETT): 469 MS
EKG QTC CALCULATION (BAZETT): 497 MS
EKG R AXIS: 102 DEGREES
EKG R AXIS: 99 DEGREES
EKG T AXIS: 81 DEGREES
EKG T AXIS: 81 DEGREES
EKG VENTRICULAR RATE: 157 BPM
EKG VENTRICULAR RATE: 169 BPM
EOSINOPHILS ABSOLUTE: 0 K/UL (ref 0–0.6)
EOSINOPHILS RELATIVE PERCENT: 0 %
GFR AFRICAN AMERICAN: >60
GFR NON-AFRICAN AMERICAN: >60
GLOBULIN: 3.1 G/DL
GLUCOSE BLD-MCNC: 136 MG/DL (ref 70–99)
GLUCOSE BLD-MCNC: 170 MG/DL (ref 70–99)
GLUCOSE BLD-MCNC: 173 MG/DL (ref 70–99)
GLUCOSE URINE: NEGATIVE MG/DL
HCT VFR BLD CALC: 33.6 % (ref 36–48)
HEMOGLOBIN: 9.4 G/DL (ref 12–16)
HYPOCHROMIA: ABNORMAL
INR BLD: 1.5 (ref 0.86–1.14)
KETONES, URINE: NEGATIVE MG/DL
LACTIC ACID: 3.3 MMOL/L (ref 0.4–2)
LACTIC ACID: 3.7 MMOL/L (ref 0.4–2)
LACTIC ACID: 8.5 MMOL/L (ref 0.4–2)
LEUKOCYTE ESTERASE, URINE: ABNORMAL
LIPASE: 23 U/L (ref 13–60)
LYMPHOCYTES ABSOLUTE: 3.3 K/UL (ref 1–5.1)
LYMPHOCYTES RELATIVE PERCENT: 9 %
MCH RBC QN AUTO: 21.2 PG (ref 26–34)
MCHC RBC AUTO-ENTMCNC: 28.1 G/DL (ref 31–36)
MCV RBC AUTO: 75.4 FL (ref 80–100)
MICROSCOPIC EXAMINATION: YES
MONOCYTES ABSOLUTE: 2.2 K/UL (ref 0–1.3)
MONOCYTES RELATIVE PERCENT: 6 %
MYELOCYTE PERCENT: 1 %
NEUTROPHILS ABSOLUTE: 31.5 K/UL (ref 1.7–7.7)
NEUTROPHILS RELATIVE PERCENT: 83 %
NITRITE, URINE: NEGATIVE
NUCLEATED RED BLOOD CELLS: 2 /100 WBC
OVALOCYTES: ABNORMAL
PDW BLD-RTO: 20.2 % (ref 12.4–15.4)
PERFORMED ON: ABNORMAL
PERFORMED ON: ABNORMAL
PH UA: 7 (ref 5–8)
PLATELET # BLD: 543 K/UL (ref 135–450)
PLATELET SLIDE REVIEW: ABNORMAL
PMV BLD AUTO: 8.2 FL (ref 5–10.5)
POIKILOCYTES: ABNORMAL
POLYCHROMASIA: ABNORMAL
POTASSIUM SERPL-SCNC: 5 MMOL/L (ref 3.5–5.1)
PROTEIN UA: >=300 MG/DL
PROTHROMBIN TIME: 17.1 SEC (ref 9.8–13)
RBC # BLD: 4.46 M/UL (ref 4–5.2)
RBC UA: ABNORMAL /HPF (ref 0–2)
SLIDE REVIEW: ABNORMAL
SODIUM BLD-SCNC: 144 MMOL/L (ref 136–145)
SPECIFIC GRAVITY UA: 1.02 (ref 1–1.03)
TARGET CELLS: ABNORMAL
TEAR DROP CELLS: ABNORMAL
TOTAL PROTEIN: 7.2 G/DL (ref 6.4–8.2)
TROPONIN: 0.09 NG/ML
TROPONIN: 0.1 NG/ML
TROPONIN: 0.14 NG/ML
URINE REFLEX TO CULTURE: YES
URINE TYPE: ABNORMAL
UROBILINOGEN, URINE: 0.2 E.U./DL
WBC # BLD: 37 K/UL (ref 4–11)
WBC UA: >100 /HPF (ref 0–5)

## 2019-04-06 PROCEDURE — 96365 THER/PROPH/DIAG IV INF INIT: CPT

## 2019-04-06 PROCEDURE — 85610 PROTHROMBIN TIME: CPT

## 2019-04-06 PROCEDURE — 83690 ASSAY OF LIPASE: CPT

## 2019-04-06 PROCEDURE — 51702 INSERT TEMP BLADDER CATH: CPT

## 2019-04-06 PROCEDURE — 93005 ELECTROCARDIOGRAM TRACING: CPT | Performed by: EMERGENCY MEDICINE

## 2019-04-06 PROCEDURE — 87086 URINE CULTURE/COLONY COUNT: CPT

## 2019-04-06 PROCEDURE — 85730 THROMBOPLASTIN TIME PARTIAL: CPT

## 2019-04-06 PROCEDURE — 2580000003 HC RX 258: Performed by: FAMILY MEDICINE

## 2019-04-06 PROCEDURE — 2500000003 HC RX 250 WO HCPCS: Performed by: FAMILY MEDICINE

## 2019-04-06 PROCEDURE — 80053 COMPREHEN METABOLIC PANEL: CPT

## 2019-04-06 PROCEDURE — 83605 ASSAY OF LACTIC ACID: CPT

## 2019-04-06 PROCEDURE — 85025 COMPLETE CBC W/AUTO DIFF WBC: CPT

## 2019-04-06 PROCEDURE — 2500000003 HC RX 250 WO HCPCS: Performed by: EMERGENCY MEDICINE

## 2019-04-06 PROCEDURE — 2500000003 HC RX 250 WO HCPCS

## 2019-04-06 PROCEDURE — 73070 X-RAY EXAM OF ELBOW: CPT

## 2019-04-06 PROCEDURE — 84484 ASSAY OF TROPONIN QUANT: CPT

## 2019-04-06 PROCEDURE — 2580000003 HC RX 258: Performed by: NURSE PRACTITIONER

## 2019-04-06 PROCEDURE — 36415 COLL VENOUS BLD VENIPUNCTURE: CPT

## 2019-04-06 PROCEDURE — 83036 HEMOGLOBIN GLYCOSYLATED A1C: CPT

## 2019-04-06 PROCEDURE — 2060000000 HC ICU INTERMEDIATE R&B

## 2019-04-06 PROCEDURE — 84443 ASSAY THYROID STIM HORMONE: CPT

## 2019-04-06 PROCEDURE — 6360000002 HC RX W HCPCS: Performed by: EMERGENCY MEDICINE

## 2019-04-06 PROCEDURE — 96366 THER/PROPH/DIAG IV INF ADDON: CPT

## 2019-04-06 PROCEDURE — 6360000004 HC RX CONTRAST MEDICATION: Performed by: EMERGENCY MEDICINE

## 2019-04-06 PROCEDURE — 6360000002 HC RX W HCPCS

## 2019-04-06 PROCEDURE — 71045 X-RAY EXAM CHEST 1 VIEW: CPT

## 2019-04-06 PROCEDURE — 96375 TX/PRO/DX INJ NEW DRUG ADDON: CPT

## 2019-04-06 PROCEDURE — 96376 TX/PRO/DX INJ SAME DRUG ADON: CPT

## 2019-04-06 PROCEDURE — 81001 URINALYSIS AUTO W/SCOPE: CPT

## 2019-04-06 PROCEDURE — 74177 CT ABD & PELVIS W/CONTRAST: CPT

## 2019-04-06 PROCEDURE — 99285 EMERGENCY DEPT VISIT HI MDM: CPT

## 2019-04-06 PROCEDURE — 2580000003 HC RX 258: Performed by: EMERGENCY MEDICINE

## 2019-04-06 PROCEDURE — 6370000000 HC RX 637 (ALT 250 FOR IP): Performed by: EMERGENCY MEDICINE

## 2019-04-06 PROCEDURE — 6360000002 HC RX W HCPCS: Performed by: FAMILY MEDICINE

## 2019-04-06 PROCEDURE — 96367 TX/PROPH/DG ADDL SEQ IV INF: CPT

## 2019-04-06 PROCEDURE — 93010 ELECTROCARDIOGRAM REPORT: CPT | Performed by: INTERNAL MEDICINE

## 2019-04-06 PROCEDURE — 87040 BLOOD CULTURE FOR BACTERIA: CPT

## 2019-04-06 PROCEDURE — 6370000000 HC RX 637 (ALT 250 FOR IP): Performed by: FAMILY MEDICINE

## 2019-04-06 RX ORDER — HYDROMORPHONE HCL 110MG/55ML
1 PATIENT CONTROLLED ANALGESIA SYRINGE INTRAVENOUS ONCE
Status: COMPLETED | OUTPATIENT
Start: 2019-04-06 | End: 2019-04-06

## 2019-04-06 RX ORDER — ASCORBIC ACID 500 MG
500 TABLET ORAL 2 TIMES DAILY
Status: DISCONTINUED | OUTPATIENT
Start: 2019-04-06 | End: 2019-04-16 | Stop reason: HOSPADM

## 2019-04-06 RX ORDER — DILTIAZEM HYDROCHLORIDE 5 MG/ML
15 INJECTION INTRAVENOUS ONCE
Status: COMPLETED | OUTPATIENT
Start: 2019-04-06 | End: 2019-04-06

## 2019-04-06 RX ORDER — MORPHINE SULFATE 10 MG/5ML
10 SOLUTION ORAL EVERY 4 HOURS PRN
Status: DISCONTINUED | OUTPATIENT
Start: 2019-04-06 | End: 2019-04-06

## 2019-04-06 RX ORDER — DEXTROSE MONOHYDRATE 50 MG/ML
100 INJECTION, SOLUTION INTRAVENOUS PRN
Status: DISCONTINUED | OUTPATIENT
Start: 2019-04-06 | End: 2019-04-16 | Stop reason: HOSPADM

## 2019-04-06 RX ORDER — IPRATROPIUM BROMIDE AND ALBUTEROL SULFATE 2.5; .5 MG/3ML; MG/3ML
1 SOLUTION RESPIRATORY (INHALATION) EVERY 4 HOURS
Status: ON HOLD | COMMUNITY
End: 2019-04-16 | Stop reason: HOSPADM

## 2019-04-06 RX ORDER — SODIUM CHLORIDE 0.9 % (FLUSH) 0.9 %
10 SYRINGE (ML) INJECTION PRN
Status: DISCONTINUED | OUTPATIENT
Start: 2019-04-06 | End: 2019-04-06 | Stop reason: HOSPADM

## 2019-04-06 RX ORDER — SODIUM CHLORIDE 0.9 % (FLUSH) 0.9 %
10 SYRINGE (ML) INJECTION EVERY 12 HOURS SCHEDULED
Status: DISCONTINUED | OUTPATIENT
Start: 2019-04-06 | End: 2019-04-06 | Stop reason: HOSPADM

## 2019-04-06 RX ORDER — 0.9 % SODIUM CHLORIDE 0.9 %
1000 INTRAVENOUS SOLUTION INTRAVENOUS ONCE
Status: COMPLETED | OUTPATIENT
Start: 2019-04-06 | End: 2019-04-06

## 2019-04-06 RX ORDER — OLANZAPINE 5 MG/1
2.5 TABLET ORAL EVERY 12 HOURS
Status: DISCONTINUED | OUTPATIENT
Start: 2019-04-06 | End: 2019-04-09

## 2019-04-06 RX ORDER — PREDNISONE 10 MG/1
30 TABLET ORAL DAILY
Status: DISCONTINUED | OUTPATIENT
Start: 2019-04-06 | End: 2019-04-16 | Stop reason: HOSPADM

## 2019-04-06 RX ORDER — SENNA AND DOCUSATE SODIUM 50; 8.6 MG/1; MG/1
1 TABLET, FILM COATED ORAL 2 TIMES DAILY
Status: DISCONTINUED | OUTPATIENT
Start: 2019-04-06 | End: 2019-04-13

## 2019-04-06 RX ORDER — DIPHENHYDRAMINE HYDROCHLORIDE 50 MG/ML
INJECTION INTRAMUSCULAR; INTRAVENOUS
Status: DISCONTINUED
Start: 2019-04-06 | End: 2019-04-06 | Stop reason: HOSPADM

## 2019-04-06 RX ORDER — QUETIAPINE FUMARATE 25 MG/1
25 TABLET, FILM COATED ORAL NIGHTLY
Status: DISCONTINUED | OUTPATIENT
Start: 2019-04-06 | End: 2019-04-16 | Stop reason: HOSPADM

## 2019-04-06 RX ORDER — BACLOFEN 10 MG/1
20 TABLET ORAL 3 TIMES DAILY
Status: DISCONTINUED | OUTPATIENT
Start: 2019-04-06 | End: 2019-04-16 | Stop reason: HOSPADM

## 2019-04-06 RX ORDER — IPRATROPIUM BROMIDE AND ALBUTEROL SULFATE 2.5; .5 MG/3ML; MG/3ML
1 SOLUTION RESPIRATORY (INHALATION) EVERY 4 HOURS PRN
Status: DISCONTINUED | OUTPATIENT
Start: 2019-04-06 | End: 2019-04-16 | Stop reason: HOSPADM

## 2019-04-06 RX ORDER — DILTIAZEM HYDROCHLORIDE 5 MG/ML
INJECTION INTRAVENOUS
Status: COMPLETED
Start: 2019-04-06 | End: 2019-04-06

## 2019-04-06 RX ORDER — SODIUM CHLORIDE 0.9 % (FLUSH) 0.9 %
10 SYRINGE (ML) INJECTION EVERY 12 HOURS SCHEDULED
Status: DISCONTINUED | OUTPATIENT
Start: 2019-04-06 | End: 2019-04-08

## 2019-04-06 RX ORDER — LORAZEPAM 2 MG/ML
1 INJECTION INTRAMUSCULAR EVERY 10 MIN PRN
Status: DISCONTINUED | OUTPATIENT
Start: 2019-04-06 | End: 2019-04-16 | Stop reason: HOSPADM

## 2019-04-06 RX ORDER — ASPIRIN 81 MG/1
81 TABLET, CHEWABLE ORAL DAILY
Status: DISCONTINUED | OUTPATIENT
Start: 2019-04-06 | End: 2019-04-16 | Stop reason: HOSPADM

## 2019-04-06 RX ORDER — HYDROMORPHONE HCL 110MG/55ML
1 PATIENT CONTROLLED ANALGESIA SYRINGE INTRAVENOUS EVERY 4 HOURS PRN
Status: DISCONTINUED | OUTPATIENT
Start: 2019-04-06 | End: 2019-04-16 | Stop reason: HOSPADM

## 2019-04-06 RX ORDER — AMANTADINE HYDROCHLORIDE 50 MG/5ML
100 SOLUTION ORAL EVERY 12 HOURS
Status: DISCONTINUED | OUTPATIENT
Start: 2019-04-06 | End: 2019-04-16 | Stop reason: HOSPADM

## 2019-04-06 RX ORDER — SODIUM CHLORIDE 0.9 % (FLUSH) 0.9 %
10 SYRINGE (ML) INJECTION PRN
Status: DISCONTINUED | OUTPATIENT
Start: 2019-04-06 | End: 2019-04-08

## 2019-04-06 RX ORDER — PANTOPRAZOLE SODIUM 40 MG/1
40 GRANULE, DELAYED RELEASE ORAL DAILY
Status: DISCONTINUED | OUTPATIENT
Start: 2019-04-06 | End: 2019-04-16 | Stop reason: HOSPADM

## 2019-04-06 RX ORDER — LORAZEPAM 2 MG/ML
INJECTION INTRAMUSCULAR
Status: COMPLETED
Start: 2019-04-06 | End: 2019-04-06

## 2019-04-06 RX ORDER — MORPHINE SULFATE 10 MG/5ML
10 SOLUTION ORAL EVERY 4 HOURS PRN
Status: ON HOLD | COMMUNITY
End: 2019-04-16 | Stop reason: HOSPADM

## 2019-04-06 RX ORDER — GABAPENTIN 250 MG/5ML
200 SOLUTION ORAL EVERY 8 HOURS
Status: DISCONTINUED | OUTPATIENT
Start: 2019-04-06 | End: 2019-04-16 | Stop reason: HOSPADM

## 2019-04-06 RX ORDER — DIPHENHYDRAMINE HYDROCHLORIDE 50 MG/ML
50 INJECTION INTRAMUSCULAR; INTRAVENOUS ONCE
Status: COMPLETED | OUTPATIENT
Start: 2019-04-06 | End: 2019-04-06

## 2019-04-06 RX ORDER — LACOSAMIDE 50 MG/1
100 TABLET ORAL 2 TIMES DAILY
Status: DISCONTINUED | OUTPATIENT
Start: 2019-04-06 | End: 2019-04-16 | Stop reason: HOSPADM

## 2019-04-06 RX ORDER — NICOTINE POLACRILEX 4 MG
15 LOZENGE BUCCAL PRN
Status: DISCONTINUED | OUTPATIENT
Start: 2019-04-06 | End: 2019-04-16 | Stop reason: HOSPADM

## 2019-04-06 RX ORDER — ONDANSETRON 2 MG/ML
4 INJECTION INTRAMUSCULAR; INTRAVENOUS EVERY 6 HOURS PRN
Status: DISCONTINUED | OUTPATIENT
Start: 2019-04-06 | End: 2019-04-16 | Stop reason: HOSPADM

## 2019-04-06 RX ORDER — ATORVASTATIN CALCIUM 80 MG/1
80 TABLET, FILM COATED ORAL NIGHTLY
Status: DISCONTINUED | OUTPATIENT
Start: 2019-04-06 | End: 2019-04-16 | Stop reason: HOSPADM

## 2019-04-06 RX ORDER — UREA 10 %
3 LOTION (ML) TOPICAL NIGHTLY
Status: DISCONTINUED | OUTPATIENT
Start: 2019-04-06 | End: 2019-04-16 | Stop reason: HOSPADM

## 2019-04-06 RX ORDER — M-VIT,TX,IRON,MINS/CALC/FOLIC 27MG-0.4MG
1 TABLET ORAL DAILY
Status: DISCONTINUED | OUTPATIENT
Start: 2019-04-06 | End: 2019-04-13

## 2019-04-06 RX ORDER — ACETAMINOPHEN 325 MG/1
650 TABLET ORAL EVERY 6 HOURS PRN
Status: DISCONTINUED | OUTPATIENT
Start: 2019-04-06 | End: 2019-04-08

## 2019-04-06 RX ORDER — DILTIAZEM HYDROCHLORIDE 5 MG/ML
10 INJECTION INTRAVENOUS ONCE
Status: COMPLETED | OUTPATIENT
Start: 2019-04-06 | End: 2019-04-06

## 2019-04-06 RX ORDER — ORPHENADRINE CITRATE 30 MG/ML
60 INJECTION INTRAMUSCULAR; INTRAVENOUS ONCE
Status: DISCONTINUED | OUTPATIENT
Start: 2019-04-06 | End: 2019-04-06 | Stop reason: HOSPADM

## 2019-04-06 RX ORDER — FLUOXETINE HYDROCHLORIDE 20 MG/5ML
20 LIQUID ORAL DAILY
Status: DISCONTINUED | OUTPATIENT
Start: 2019-04-06 | End: 2019-04-16 | Stop reason: HOSPADM

## 2019-04-06 RX ORDER — IPRATROPIUM BROMIDE AND ALBUTEROL SULFATE 2.5; .5 MG/3ML; MG/3ML
1 SOLUTION RESPIRATORY (INHALATION) EVERY 4 HOURS
Status: DISCONTINUED | OUTPATIENT
Start: 2019-04-06 | End: 2019-04-06

## 2019-04-06 RX ORDER — MORPHINE SULFATE 2 MG/ML
5 INJECTION, SOLUTION INTRAMUSCULAR; INTRAVENOUS EVERY 6 HOURS PRN
Status: ON HOLD | COMMUNITY
End: 2019-04-16 | Stop reason: HOSPADM

## 2019-04-06 RX ORDER — LEVETIRACETAM 100 MG/ML
1000 SOLUTION ORAL 2 TIMES DAILY
Status: DISCONTINUED | OUTPATIENT
Start: 2019-04-06 | End: 2019-04-16 | Stop reason: HOSPADM

## 2019-04-06 RX ORDER — SODIUM CHLORIDE 9 MG/ML
INJECTION, SOLUTION INTRAVENOUS CONTINUOUS
Status: DISCONTINUED | OUTPATIENT
Start: 2019-04-06 | End: 2019-04-09

## 2019-04-06 RX ORDER — FOLIC ACID 1 MG/1
1 TABLET ORAL DAILY
Status: DISCONTINUED | OUTPATIENT
Start: 2019-04-06 | End: 2019-04-16 | Stop reason: HOSPADM

## 2019-04-06 RX ORDER — LIDOCAINE HYDROCHLORIDE 10 MG/ML
5 INJECTION, SOLUTION INFILTRATION; PERINEURAL ONCE
Status: DISCONTINUED | OUTPATIENT
Start: 2019-04-06 | End: 2019-04-06 | Stop reason: HOSPADM

## 2019-04-06 RX ORDER — LORAZEPAM 2 MG/ML
0.5 INJECTION INTRAMUSCULAR ONCE
Status: COMPLETED | OUTPATIENT
Start: 2019-04-06 | End: 2019-04-06

## 2019-04-06 RX ORDER — ACETAMINOPHEN 325 MG/1
650 TABLET ORAL EVERY 4 HOURS PRN
Status: DISCONTINUED | OUTPATIENT
Start: 2019-04-06 | End: 2019-04-08

## 2019-04-06 RX ORDER — DEXTROSE MONOHYDRATE 25 G/50ML
12.5 INJECTION, SOLUTION INTRAVENOUS PRN
Status: DISCONTINUED | OUTPATIENT
Start: 2019-04-06 | End: 2019-04-16 | Stop reason: HOSPADM

## 2019-04-06 RX ORDER — TIZANIDINE 4 MG/1
2 TABLET ORAL 3 TIMES DAILY
Status: DISCONTINUED | OUTPATIENT
Start: 2019-04-06 | End: 2019-04-16 | Stop reason: HOSPADM

## 2019-04-06 RX ORDER — IPRATROPIUM BROMIDE AND ALBUTEROL SULFATE 2.5; .5 MG/3ML; MG/3ML
1 SOLUTION RESPIRATORY (INHALATION) ONCE
Status: COMPLETED | OUTPATIENT
Start: 2019-04-06 | End: 2019-04-06

## 2019-04-06 RX ADMIN — LORAZEPAM 0.5 MG: 2 INJECTION INTRAMUSCULAR; INTRAVENOUS at 09:32

## 2019-04-06 RX ADMIN — DOCUSATE SODIUM 100 MG: 50 LIQUID ORAL at 21:25

## 2019-04-06 RX ADMIN — GABAPENTIN 200 MG: 250 SOLUTION ORAL at 21:45

## 2019-04-06 RX ADMIN — AMANTADINE HYDROCHLORIDE 100 MG: 50 SOLUTION ORAL at 21:44

## 2019-04-06 RX ADMIN — TIZANIDINE 2 MG: 4 TABLET ORAL at 21:25

## 2019-04-06 RX ADMIN — HYDROMORPHONE HYDROCHLORIDE 1 MG: 2 INJECTION, SOLUTION INTRAMUSCULAR; INTRAVENOUS; SUBCUTANEOUS at 13:42

## 2019-04-06 RX ADMIN — APIXABAN 5 MG: 5 TABLET, FILM COATED ORAL at 21:25

## 2019-04-06 RX ADMIN — IOPAMIDOL 75 ML: 755 INJECTION, SOLUTION INTRAVENOUS at 11:00

## 2019-04-06 RX ADMIN — BACLOFEN 20 MG: 10 TABLET ORAL at 17:28

## 2019-04-06 RX ADMIN — ATORVASTATIN CALCIUM 80 MG: 80 TABLET, FILM COATED ORAL at 21:25

## 2019-04-06 RX ADMIN — DILTIAZEM HYDROCHLORIDE 5 MG/HR: 5 INJECTION INTRAVENOUS at 09:58

## 2019-04-06 RX ADMIN — VANCOMYCIN HYDROCHLORIDE 750 MG: 750 INJECTION, POWDER, LYOPHILIZED, FOR SOLUTION INTRAVENOUS at 19:04

## 2019-04-06 RX ADMIN — DILTIAZEM HYDROCHLORIDE 10 MG: 5 INJECTION INTRAVENOUS at 09:22

## 2019-04-06 RX ADMIN — FOLIC ACID 1 MG: 1 TABLET ORAL at 17:28

## 2019-04-06 RX ADMIN — Medication 1000 MG: at 21:44

## 2019-04-06 RX ADMIN — HYDROMORPHONE HYDROCHLORIDE 1 MG: 2 INJECTION, SOLUTION INTRAMUSCULAR; INTRAVENOUS; SUBCUTANEOUS at 11:30

## 2019-04-06 RX ADMIN — DILTIAZEM HYDROCHLORIDE 5 MG/HR: 5 INJECTION INTRAVENOUS at 16:55

## 2019-04-06 RX ADMIN — IPRATROPIUM BROMIDE AND ALBUTEROL SULFATE 1 AMPULE: .5; 3 SOLUTION RESPIRATORY (INHALATION) at 13:26

## 2019-04-06 RX ADMIN — Medication 1 TABLET: at 17:31

## 2019-04-06 RX ADMIN — HYDROMORPHONE HYDROCHLORIDE 1 MG: 2 INJECTION INTRAMUSCULAR; INTRAVENOUS; SUBCUTANEOUS at 18:13

## 2019-04-06 RX ADMIN — ASPIRIN 81 MG 81 MG: 81 TABLET ORAL at 17:28

## 2019-04-06 RX ADMIN — LACOSAMIDE 100 MG: 50 TABLET, FILM COATED ORAL at 21:44

## 2019-04-06 RX ADMIN — QUETIAPINE FUMARATE 25 MG: 25 TABLET ORAL at 21:25

## 2019-04-06 RX ADMIN — PIPERACILLIN AND TAZOBACTAM 3.38 G: 3; .375 INJECTION, POWDER, FOR SOLUTION INTRAVENOUS at 20:05

## 2019-04-06 RX ADMIN — DIPHENHYDRAMINE HYDROCHLORIDE 50 MG: 50 INJECTION, SOLUTION INTRAMUSCULAR; INTRAVENOUS at 09:34

## 2019-04-06 RX ADMIN — BACLOFEN 20 MG: 10 TABLET ORAL at 21:44

## 2019-04-06 RX ADMIN — SODIUM CHLORIDE 1000 ML: 9 INJECTION, SOLUTION INTRAVENOUS at 16:10

## 2019-04-06 RX ADMIN — Medication 3 MG: at 21:25

## 2019-04-06 RX ADMIN — PREDNISONE 30 MG: 10 TABLET ORAL at 17:27

## 2019-04-06 RX ADMIN — PANTOPRAZOLE SODIUM 40 MG: 40 GRANULE, DELAYED RELEASE ORAL at 17:24

## 2019-04-06 RX ADMIN — SODIUM CHLORIDE: 9 INJECTION, SOLUTION INTRAVENOUS at 19:47

## 2019-04-06 RX ADMIN — SODIUM CHLORIDE 1000 ML: 9 INJECTION, SOLUTION INTRAVENOUS at 09:54

## 2019-04-06 RX ADMIN — DILTIAZEM HYDROCHLORIDE 15 MG: 5 INJECTION INTRAVENOUS at 09:36

## 2019-04-06 RX ADMIN — LORAZEPAM 0.5 MG: 2 INJECTION INTRAMUSCULAR at 09:32

## 2019-04-06 RX ADMIN — OXYCODONE HYDROCHLORIDE AND ACETAMINOPHEN 500 MG: 500 TABLET ORAL at 21:25

## 2019-04-06 RX ADMIN — CEFTRIAXONE SODIUM 1 G: 1 INJECTION, POWDER, FOR SOLUTION INTRAMUSCULAR; INTRAVENOUS at 10:03

## 2019-04-06 RX ADMIN — MORPHINE SULFATE 10 MG: 10 SOLUTION ORAL at 16:38

## 2019-04-06 RX ADMIN — OLANZAPINE 2.5 MG: 5 TABLET, FILM COATED ORAL at 21:25

## 2019-04-06 ASSESSMENT — PAIN SCALES - GENERAL
PAINLEVEL_OUTOF10: 10
PAINLEVEL_OUTOF10: 7

## 2019-04-06 NOTE — ED NOTES
Spoke with Aaliyah Carson, TASHA for report. Bed number reassigned to 425-1. Aaliyah Carson, RN to call back for full report.       Lawence Press, RN  04/06/19 1579

## 2019-04-06 NOTE — ED NOTES
Patient remains tearful after receiving pain medications.  Dr. Mary Alice Nathan MD, made aware     Julianna Liu RN  04/06/19 0176

## 2019-04-06 NOTE — FLOWSHEET NOTE
04/06/19 1554   Vital Signs   Temp 99.1 °F (37.3 °C)   Temp Source Axillary   Pulse 141   Heart Rate Source Monitor   Resp 18   /74   Level of Consciousness 0   MEWS Score 4   Patient Currently in Pain Yes   Oxygen Therapy   SpO2 95 %   O2 Device High flow nasal cannula   O2 Flow Rate (L/min) 6 L/min   received DA from [facility]. Dr. Montez Talbot aware of MEWS of 4. Pt moaning at bedside and baseline is nonverbal. Cardizem drip is off per orders. HR continues to sustain in the 130's. Dr. Montez Talbot at bedside will await new orders.

## 2019-04-06 NOTE — ED PROVIDER NOTES
Triage Chief Complaint:   Tachycardia (SVT upon EMS arrival)    [place]:  Davina Keller is a 53 y.o. female that presents a rapid heart rate. Apparently they were doing rounds on the patient at the nursing home this morning noticed she was sweaty and not quite as responsive as usual and noted a very rapid heart rate, so sent her into the emergency department. Patient herself for the most part is not verbal after a stroke and is unable to give any history. She is able to communicate somewhat and complains only of pain in her buttock area. Patient does have a known bedsore in the area of her pain.     ROS:  Unable to obtain    Past Medical History:   Diagnosis Date    Anorexia     Arthritis     RA    Bulging lumbar disc     C. difficile diarrhea 09/30/2017    Cancer ([facility] [address]     bladder CA 2014    Cerebral artery occlusion with cerebral infarction [facility])     x3 2017    Chronic kidney disease     bladder ca    Hypertriglyceridemia 10/5/2017    Post traumatic stress disorder     Seasonal allergies      Past Surgical History:   Procedure Laterality Date    BLADDER SURGERY      [place]    CYSTOSCOPY      transurethral resection bladder tumor    CYSTOSCOPY  6-13-13    TRANSURETHRAL RESECTION BLADDER TUMOR    CYSTOSCOPY  11-    Cystoscopy, bladder biopsy and fulgeration    TUBAL LIGATION       Family History   Problem Relation Age of Onset    Stroke Mother     Heart Disease Father         pacemaker     Social History     Socioeconomic History    Marital status:      Spouse name: Not on file    Number of children: Not on file    Years of education: Not on file    Highest education level: Not on file   Occupational History    Not on file   Social Needs    Financial resource strain: Not on file    Food insecurity:     Worry: Not on file     Inability: Not on file    Transportation needs:     Medical: Not on file     Non-medical: Not on file   Tobacco Use    Smoking status: Current Every Day Smoker     Packs/day: 0.50     Years: 35.00     Pack years: 17.50     Types: Cigarettes    Smokeless tobacco: Never Used   Substance and Sexual Activity    Alcohol use: No    Drug use: No    Sexual activity: Not on file   Lifestyle    Physical activity:     Days per week: Not on file     Minutes per session: Not on file    Stress: Not on file   Relationships    Social connections:     Talks on phone: Not on file     Gets together: Not on file     Attends [employer] service: Not on file     Active member of club or organization: Not on file     Attends meetings of clubs or organizations: Not on file     Relationship status: Not on file    Intimate partner violence:     Fear of current or ex partner: Not on file     Emotionally abused: Not on file     Physically abused: Not on file     Forced sexual activity: Not on file   Other Topics Concern    Not on file   Social History Narrative    Not on file     Current Facility-Administered Medications   Medication Dose Route Frequency Provider Last Rate Last Dose    diltiazem 125 mg in dextrose 5 % 125 mL infusion  5 mg/hr Intravenous Continuous Cassia Rosario MD 12.5 mL/hr at 04/06/19 1424 12.5 mg/hr at 04/06/19 1424    orphenadrine (NORFLEX) injection 60 mg  60 mg Intravenous Once Cassia Rosario MD   Stopped at 04/06/19 0950    diphenhydrAMINE (BENADRYL) 50 MG/ML injection             lidocaine 1 % injection 5 mL  5 mL Intradermal Once Cassia Rosario MD        sodium chloride flush 0.9 % injection 10 mL  10 mL Intravenous 2 times per day Cassia Rosario MD        sodium chloride flush 0.9 % injection 10 mL  10 mL Intravenous PRN Verneda Cashing DARCY Rosario MD         Current Outpatient Medications   Medication Sig Dispense Refill    ipratropium-albuterol (DUONEB) 0.5-2.5 (3) MG/3ML SOLN nebulizer solution Inhale 1 vial into the lungs every 4 hours      morphine 10 MG/5ML solution 10 mg by Per G Tube route every 4 hours as needed for Pain (Severe pain, 7-10).      morphine 2 MG/ML injection 5 mg every 6 hours as needed for Pain. Per G-Tube q6 prn      esomeprazole Magnesium (NEXIUM) 40 MG PACK 40 mg by Per G Tube route daily      Multiple Vitamins-Minerals (THERAPEUTIC MULTIVITAMIN-MINERALS) tablet 1 tablet by Per G Tube route daily      predniSONE 5 MG/5ML solution 30 mg by Per G Tube route daily      docusate sodium (COLACE) 100 MG capsule 100 mg by Per G Tube route 2 times daily      levETIRAcetam (KEPPRA) 1000 MG tablet 1,000 mg 2 times daily      sennosides-docusate sodium (SENOKOT-S) 8.6-50 MG tablet 1 tablet by Per G Tube route 2 times daily      vitamin C (ASCORBIC ACID) 500 MG tablet 500 mg by Per G Tube route 2 times daily      gabapentin (NEURONTIN) 100 MG capsule by Per G Tube route every 8 hours. 250mg/5ml, give 4mL q8h      tiZANidine (ZANAFLEX) 2 MG tablet 2 mg by Per G Tube route 3 times daily      acetaminophen (TYLENOL) 325 MG tablet 650 mg by Per G Tube route every 6 hours as needed for Pain      amantadine (SYMMETREL) 50 MG/5ML solution 100 mg by Per G Tube route every 12 hours       apixaban (ELIQUIS) 5 MG TABS tablet 5 mg by Per G Tube route 2 times daily       baclofen (LIORESAL) 20 MG tablet 20 mg by Per G Tube route Per F MAR, \"give with meals\"      ergocalciferol (DRISDOL) 8000 UNIT/ML drops 8,000 Units by Per G Tube route every 7 days       FLUoxetine (PROZAC) 20 MG/5ML solution by Per G Tube route daily       folic acid (FOLVITE) 1 MG tablet 1 mg by Per G Tube route daily       lacosamide (VIMPAT) 50 MG TABS tablet 100 mg by Per G Tube route 2 times daily.        melatonin 3 MG TABS tablet 3 mg by Per G Tube route daily       methotrexate Sodium (RHEUMATREX) 50 MG/2ML chemo injection Inject 20 mg into the skin once a week Indications: on Tuesdays Sundays      OLANZapine (ZYPREXA) 2.5 MG tablet Take 2.5 mg by mouth every 12 hours       QUEtiapine (SEROQUEL) 25 MG tablet 25 mg by Per G Tube route nightly       sulfamethoxazole-trimethoprim (BACTRIM;SEPTRA) 200-40 MG/5ML suspension 160 mg by Per G Tube route three times a week       aspirin 81 MG chewable tablet Take 1 tablet by mouth daily (Patient taking differently: 81 mg by Per G Tube route daily ) 30 tablet 3    atorvastatin (LIPITOR) 80 MG tablet Take 1 tablet by mouth nightly (Patient taking differently: 80 mg by Per G Tube route nightly ) 30 tablet 0     Allergies   Allergen Reactions    Acetaminophen     Ativan [Lorazepam] Swelling    Codeine Hives    Darvocet [Propoxyphene N-Acetaminophen]     Methadone     Ultram [Tramadol] Swelling    Lodine [Etodolac] Rash       Nursing Notes Reviewed    Physical Exam:  ED Triage Vitals [04/06/19 0909]   Enc Vitals Group      BP (!) 183/81      Pulse 166      Resp 30      Temp 98.5 °F (36.9 °C)      Temp Source Axillary      SpO2 94 %      Weight 119 lb (54 kg)      Height       Head Circumference       Peak Flow       Pain Score       Pain Loc       Pain Edu? Excl. in [address]? My pulse ox interpretation is - normal    General appearance: In mild distress secondary to tachycardia. .  She is also noted to have some rhythmic movements of her chest and extremities  Head is normocephalic, atraumatic  Skin:  Warm. Moist  Eye:  Extraocular movements intact. .  Conjunctiva is clear     Ears, nose, mouth and throat:  Oral mucosa dry  Neck:  Trachea midline. Myrtle Never No JVD  Extremity:  No swelling. Normal ROM patient is mottled from the waist to the toes bilaterally  . Pulses are decreased but present with delayed capillary refill to her toes     Heart:  Regular rate and rhythm, tachycardic   Perfusion:  intact  Respiratory:  Lungs clear to auscultation bilaterally in the upper fields lower fields have a few scattered wheezes and decreased breath sounds. No rales or rhonchi are appreciated at this time. Respirations nonlabored. Abdominal:  Normal bowel sounds. Soft. Nontender. Non distended.   .  She does have a bedsore on her buttock that is clean and dry dressing was changed here now  Back:  No CVA tenderness to palpation     Neurological:  Alert . At times, able to answer appropriately. At other times is nonverbal .  She did have rhythmic movements of her chest and extremities.            I have reviewed and interpreted all of the currently available lab results from this visit (if applicable):  Results for orders placed or performed during the hospital encounter of 04/06/19   APTT   Result Value Ref Range    aPTT 26.2 26.0 - 36.0 sec   CBC Auto Differential   Result Value Ref Range    WBC 37.0 (H) 4.0 - 11.0 K/uL    RBC 4.46 4.00 - 5.20 M/uL    Hemoglobin 9.4 (L) 12.0 - 16.0 g/dL    Hematocrit 33.6 (L) 36.0 - 48.0 %    MCV 75.4 (L) 80.0 - 100.0 fL    MCH 21.2 (L) 26.0 - 34.0 pg    MCHC 28.1 (L) 31.0 - 36.0 g/dL    RDW 20.2 (H) 12.4 - 15.4 %    Platelets 879 (H) 320 - 450 K/uL    MPV 8.2 5.0 - 10.5 fL    PLATELET SLIDE REVIEW Increased     SLIDE REVIEW see below     Neutrophils % 83.0 %    Lymphocytes % 9.0 %    Monocytes % 6.0 %    Eosinophils % 0.0 %    Basophils % 0.0 %    Neutrophils # 31.5 (H) 1.7 - 7.7 K/uL    Lymphocytes # 3.3 1.0 - 5.1 K/uL    Monocytes # 2.2 (H) 0.0 - 1.3 K/uL    Eosinophils # 0.0 0.0 - 0.6 K/uL    Basophils # 0.0 0.0 - 0.2 K/uL    Bands Relative 1 0 - 7 %    Myelocytes Relative 1 (A) %    nRBC 2 (A) /100 WBC    Anisocytosis 2+ (A)     Polychromasia 1+ (A)     Hypochromia Occasional (A)     Poikilocytes 1+ (A)     Ovalocytes Occasional (A)     Target Cells Occasional (A)     Tear Drop Cells Occasional (A)    Comprehensive Metabolic Panel   Result Value Ref Range    Sodium 144 136 - 145 mmol/L    Potassium 5.0 3.5 - 5.1 mmol/L    Chloride 99 99 - 110 mmol/L    CO2 21 21 - 32 mmol/L    Anion Gap 24 (H) 3 - 16    Glucose 170 (H) 70 - 99 mg/dL    BUN 26 (H) 7 - 20 mg/dL    CREATININE 0.9 0.6 - 1.1 mg/dL    GFR Non-African American >60 >60    GFR African American >60 >60    Calcium 9.9 8.3 - 10.6 mg/dL    Total Protein 7.2 6.4 - 8.2 g/dL    Alb 4.1 3.4 - 5.0 g/dL    Albumin/Globulin Ratio 1.3 1.1 - 2.2    Total Bilirubin <0.2 0.0 - 1.0 mg/dL    Alkaline Phosphatase 148 (H) 40 - 129 U/L    ALT 32 10 - 40 U/L    AST 18 15 - 37 U/L    Globulin 3.1 g/dL   Lactic Acid, Plasma   Result Value Ref Range    Lactic Acid 8.5 (HH) 0.4 - 2.0 mmol/L   Troponin   Result Value Ref Range    Troponin 0.14 (H) <0.01 ng/mL   Protime-INR   Result Value Ref Range    Protime 17.1 (H) 9.8 - 13.0 sec    INR 1.50 (H) 0.86 - 1.14   Lipase   Result Value Ref Range    Lipase 23.0 13.0 - 60.0 U/L   Urinalysis Reflex to Culture   Result Value Ref Range    Color, UA Yellow Straw/Yellow    Clarity, UA TURBID (A) Clear    Glucose, Ur Negative Negative mg/dL    Bilirubin Urine Negative Negative    Ketones, Urine Negative Negative mg/dL    Specific Gravity, UA 1.025 1.005 - 1.030    Blood, Urine LARGE (A) Negative    pH, UA 7.0 5.0 - 8.0    Protein, UA >=300 (A) Negative mg/dL    Urobilinogen, Urine 0.2 <2.0 E.U./dL    Nitrite, Urine Negative Negative    Leukocyte Esterase, Urine LARGE (A) Negative    Microscopic Examination YES     Urine Reflex to Culture Yes     Urine Type Not Specified    Microscopic Urinalysis   Result Value Ref Range    WBC, UA >100 (A) 0 - 5 /HPF    RBC, UA see below 0 - 2 /HPF    Bacteria, UA 4+ (A) /HPF    Urinalysis Comments see below    POCT Glucose   Result Value Ref Range    POC Glucose 173 (H) 70 - 99 mg/dl    Performed on ACCU-CHEK    EKG 12 Lead   Result Value Ref Range    Ventricular Rate 169 BPM    Atrial Rate 153 BPM    QRS Duration 90 ms    Q-T Interval 280 ms    QTc Calculation (Bazett) 469 ms    R Axis 102 degrees    T Axis 81 degrees    Diagnosis       Supraventricular tachycardia with occasional Premature ventricular complexesRightward axisNonspecific ST abnormalityAbnormal ECGNo previous ECGs available   EKG 12 Lead   Result Value Ref Range    Ventricular Rate 157 BPM    Atrial Rate 157 BPM    P-R Interval 102 ms    QRS Duration 86 ms    Q-T Interval 308 ms    QTc Calculation (Bazett) 497 ms    P Axis 45 degrees    R Axis 99 degrees    T Axis 81 degrees    Diagnosis       Sinus tachycardia with short PRRightward axisBorderline ECGWhen compared with ECG of 06-APR-2019 09:10, (unconfirmed)Premature ventricular complexes are no longer PresentPR interval has increased      Radiographs (if obtained):  [] The following radiograph was interpreted by myself in the absence of a radiologist:   [x] Radiologist's Report Reviewed:  XR ELBOW RIGHT (2 VIEWS)   Preliminary Result   Diffuse induration of the subcutaneous tissues which is nonspecific but could   be seen with cellulitis or generalized edema of the right arm. No obvious fracture is noted; however, study is limited by nonstandard   positioning and overlying artifact. If there is clinical concern for an   acute fracture, repeat imaging with standard positioning and removal of   artifact would be recommended. IV line is noted in place in the distal aspect of the medial portion of the   right upper arm. CT ABDOMEN PELVIS W IV CONTRAST Additional Contrast? None   Final Result   No acute intra-abdominopelvic abnormality. Interval development of right sacral decubitus ulceration. Infrarenal abdominal aortic aneurysm measuring up 3.1 cm as well as occlusion   of the infrarenal abdominal aorta, similar to the prior study 02/18/2019. Follow-up recommendations for abdominal aortic aneurysm of the size are below. RECOMMENDATIONS:   3.1 cm AAA      Recommend follow-up every 3 years. Reference: J Vasc Surg 2009 Oct;50(4 Suppl):S2-49. XR CHEST PORTABLE   Final Result   Negative portable study.          VL Extremity Venous Right    (Results Pending)         EKG (if obtained): (All EKG's are interpreted by myself in the absence of a cardiologist)  EKG 0 sinus tachycardia at 169 bpm there is a rightward access, nonspecific ST and T-wave changes. This is an abnormal EKG. No STEMI. .  Repeat EKG at 09 34, again shows sinus tachycardia at 157 bpm there is a rightward access and nonspecific ST and T-wave changes. In comparison to the EKG done earlier. It is relatively unchanged.  , In comparison of these 2 EKGs to an EKG of 2/18/19. Rate has increased ST changes are more pronounced. Chart review shows recent radiographs:  No results found. MDM:  26-year-old female sent in for a rapid heart rate. Patient is in SVT. We have started diltiazem, both as a bolus and drip. Her rhythmic movements very rapidly degenerated into a tonic-clonic seizure. She did note an allergy to Ativan with swelling. However, I took the chance gave her half milligram of Ativan and 50 mg of Benadryl and she tolerated this well and has had no further seizure activity here today. She does have a known seizure disorder. Her troponin is elevated, this in combining with her SVT. I'm concerned about a possible cardiac problem. .  She does have a known aortic aneurysm and there is a clot. It is unchanged per radiology. She does have some swelling to the right arm. However, I'm unable to obtain an ultrasound today her elbow did not show a fracture and she is already on blood thinners. .  I would admit her for her SVT and elevated troponins. In light of her multiple problems and seizure disorder. We have opted to send her to [facility] as the hospitalist on today, does not feel comfortable with the patient here at [place]. I did therefore speak to Dr. Ciara Alvarado who accepted the patient for further care at [facility]. .  We will transfer her when the bed is ready. Clinical Impression:  1. Paroxysmal supraventricular tachycardia (HCC)    2. Seizure (HCC)    3. Leukocytosis, unspecified type    4. Elevated troponin      Disposition referral (if applicable):  No follow-up provider specified.   Disposition medications (if applicable):  New Prescriptions    No medications on file       Comment: Please note this report has been produced using speech recognition software and may contain errors related to that system including errors in grammar, punctuation, and spelling, as well as words and phrases that may be inappropriate. If there are any questions or concerns please feel free to contact the dictating provider for clarification. Latonya Preciado MD  04/06/19 [id number]

## 2019-04-06 NOTE — ED NOTES
Bed number:440-1    Report number [id number]       [employer] [id number]  Santiago Cornejoscott  04/06/19 349 Darvin Nguyen  04/06/19 8153

## 2019-04-06 NOTE — ED NOTES
Bed: 02  Expected date: 4/6/19  Expected time: 9:30 AM  Means of arrival:   Comments:  josh Mesa  04/06/19 6278

## 2019-04-06 NOTE — PROGRESS NOTES
lactic acid at 3.3. do you want continuous fluids for her or another bolus as pt HR remains in the 130's? also pt has only 1 IV. Hailee Kim attempted several times to start another with no success and there are orders for IV antibiotics. Can we get an IJ on her? Cross cover Dr. Shante Elizabeth has been messaged.

## 2019-04-06 NOTE — ED NOTES
Dr. Carlitos Mccauley MD, made aware of patient's BP, see flowsheets. OK to keep Diltiazem gtt infusing at current rate; parameter set of keeping systolic BP >36.       Ginger Ortiz RN  04/06/19 0786

## 2019-04-06 NOTE — ED NOTES
Called report, [phone], to Heron Hassan RN. Unable to take report at this time, will call back.       Saúl Capellan RN  04/06/19 7912

## 2019-04-06 NOTE — CONSULTS
Pharmacy to Dose Vancomycin    Pt wt = 49.1kg  CrCl cannot be calculated (Unknown ideal weight.). Cr = 0.9 mg/dl    Start Vancomycin 750mg IVPB q12h tomorrow   Vancomycin trough prior to 4th dose (i.e.; 1830 4/8 dose)  Trena Left 7:31 PM 4/6/2019    Day 3 Vancomycin  CrCl cannot be calculated (This lab value cannot be used to calculate CrCl because it is not a number: <0.5). Est crcl ~ 66  Vanc level at 1302 is 9.7   Current vancomycin dosing 750mg iv q12h  Increase dose to 1g iv q12h   Next level in 4 doses on 4/11 ~ 0200. Day 4 Vancomycin  CrCl cannot be calculated (This lab value cannot be used to calculate CrCl because it is not a number: <0.5). Est crcl ~ 66  Current vancomycin dosing 1g iv q12h  Vancomycin level 4/11 0200.     4/11  Vanc trough = 19.8 mcg/mL at 0204. Continue current dose and frequency of vancomycin. Will monitor and adjust dose accordingly.   Nitin Interiano, PharmD  4/11/2019 2:54 AM

## 2019-04-06 NOTE — ED NOTES
Per Dunia Voss in radiology, PICC team will be in to place PICC line.       Chey Terry RN  04/06/19 0126

## 2019-04-06 NOTE — PROGRESS NOTES
spoke with nurse at [address] [facility]. . pt does eat mechanical altered food with thin liquids and receives continuous tube feeds at night jevity 1.5 at 65 ml/hr. Dr Parish Hartman has been messaged.

## 2019-04-06 NOTE — H&P
Hospital Medicine History & Physical      PCP: [facility]    Date of Admission: 4/6/2019    Date of Service: Pt seen/examined on 4/6/19 and Admitted to Inpatient with expected LOS greater than two midnights due to medical therapy. Chief Complaint:  Lethargy , sweating       History Of Present Illness:      53 y.o. female with PMH of vasculopathy, infrarenal aortic occlusion-partial , CVA ,seizure,  non verbal , decub ulcer, PEG tube in place ,chr randle , chr pain -  presents from NH to [facility] ED with c/o being noted to be more lethargic and sweaty . In ED , pt was found to be tachycardic , controlled on cardizem gtt   Pt was noted to have a seizure and transferred to [address] [place] [address]   Pt could not contribute much to history   C/o pain all over  her body     Past Medical History:          Diagnosis Date    Anorexia     Arthritis     RA    Bulging lumbar disc     C. difficile diarrhea 09/30/2017    Cancer ([facility] [address]     bladder CA 2014    Cerebral artery occlusion with cerebral infarction [facility])     x3 2017    Chronic kidney disease     bladder ca    Hypertriglyceridemia 10/5/2017    Post traumatic stress disorder     Seasonal allergies        Past Surgical History:          Procedure Laterality Date    BLADDER SURGERY      CA    CYSTOSCOPY      transurethral resection bladder tumor    CYSTOSCOPY  6-13-13    TRANSURETHRAL RESECTION BLADDER TUMOR    CYSTOSCOPY  11-    Cystoscopy, bladder biopsy and fulgeration    TUBAL LIGATION         Medications Prior to Admission:      Prior to Admission medications    Medication Sig Start Date End Date Taking? Authorizing Provider   morphine 10 MG/5ML solution 10 mg by Per G Tube route every 4 hours as needed for Pain (Severe pain, 7-10).    Yes Historical Provider, MD   Multiple Vitamins-Minerals (THERAPEUTIC MULTIVITAMIN-MINERALS) tablet 1 tablet by Per G Tube route daily   Yes Historical Provider, MD   predniSONE 5 MG/5ML solution 30 mg by Per G Tube route apixaban (ELIQUIS) 5 MG TABS tablet 5 mg by Per G Tube route 2 times daily     Historical Provider, MD   ergocalciferol (DRISDOL) 8000 UNIT/ML drops 8,000 Units by Per G Tube route every 7 days     Historical Provider, MD   lacosamide (VIMPAT) 50 MG TABS tablet 100 mg by Per G Tube route 2 times daily. Historical Provider, MD   melatonin 3 MG TABS tablet 3 mg by Per G Tube route daily     Historical Provider, MD   methotrexate Sodium (RHEUMATREX) 50 MG/2ML chemo injection Inject 20 mg into the skin once a week Indications: on Tuesdays Sundays    Historical Provider, MD   OLANZapine (ZYPREXA) 2.5 MG tablet Take 2.5 mg by mouth every 12 hours     Historical Provider, MD   QUEtiapine (SEROQUEL) 25 MG tablet 25 mg by Per G Tube route nightly     Historical Provider, MD   sulfamethoxazole-trimethoprim (BACTRIM;SEPTRA) 200-40 MG/5ML suspension 160 mg by Per G Tube route three times a week     Historical Provider, MD       Allergies:  Acetaminophen; Ativan [lorazepam]; Codeine; Darvocet [propoxyphene n-acetaminophen]; Methadone; Ultram [tramadol]; and Lodine [etodolac]    Social History:      The patient currently lives at [address] [place] [address],[address]:   reports that she has been smoking cigarettes. She has a 17.50 pack-year smoking history. She has never used smokeless tobacco.  ETOH:   reports that she does not drink alcohol. Family History:       Reviewed in detail and negative for DM, CAD, Cancer, CVA. Positive as follows:        Problem Relation Age of Onset    Stroke Mother     Heart Disease Father         pacemaker       REVIEW OF SYSTEMS:   Pertinent positives as noted in the HPI. All other systems reviewed and negative. PHYSICAL EXAM PERFORMED:    /67   Pulse 123   Temp 99.1 °F (37.3 °C) (Axillary)   Resp 18   Wt 108 lb 3.2 oz (49.1 kg)   LMP 07/22/2013   SpO2 95%   Breastfeeding?  No   BMI 21.13 kg/m²     General appearance:  Restless   HEENT:  Normal cephalic, atraumatic without obvious deformity. Pupils equal, round, and reactive to light. Extra ocular muscles intact. Conjunctivae/corneas clear. Neck: Supple, with full range of motion. No jugular venous distention. Trachea midline. Respiratory:  Normal respiratory effort. Clear to auscultation, bilaterally without Rales/Wheezes/Rhonchi. Cardiovascular:  Regular rate and rhythm with normal S1/S2 without murmurs, rubs or gallops. Abdomen: Soft, non-tender, non-distended with normal bowel sounds. Musculoskeletal:  No clubbing, cyanosis or edema bilaterally.    Skin: decub ulcer   Chr mottling from hips and below   Neurologic: b/l UE weakness, no voluntary movement in legs   Blind     Psychiatric:  Alert   Capillary Refill: Brisk,< 3 seconds   Peripheral Pulses: +2 palpable, equal bilaterally       Labs:     Recent Labs     04/06/19 0914   WBC 37.0*   HGB 9.4*   HCT 33.6*   *     Recent Labs     04/06/19 0914      K 5.0   CL 99   CO2 21   BUN 26*   CREATININE 0.9   CALCIUM 9.9     Recent Labs     04/06/19 0914   AST 18   ALT 32   BILITOT <0.2   ALKPHOS 148*     Recent Labs     04/06/19  0914   INR 1.50*     Recent Labs     04/06/19  0914 04/06/19  1556   TROPONINI 0.14* 0.10*       Urinalysis:      Lab Results   Component Value Date    NITRU Negative 04/06/2019    WBCUA >100 04/06/2019    BACTERIA 4+ 04/06/2019    RBCUA see below 04/06/2019    BLOODU LARGE 04/06/2019    SPECGRAV 1.025 04/06/2019    GLUCOSEU Negative 04/06/2019       Radiology:     CXR: I have reviewed the CXR with the following interpretation: no acute changes   EKG:  I have reviewed the EKG with the following interpretation: svt     No orders to display       ASSESSMENT:    Active Hospital Problems    Diagnosis Date Noted    SVT (supraventricular tachycardia) ([facility] [address] [I47.1] 04/06/2019         PLAN:    Seizure   SVT  Poss sepsis  Poss UTI - catheter related   Decub ulcer     Unclear etiology for poss sepsis, pt has leucocytosis of 37 , UA suggestive of UTI ,

## 2019-04-06 NOTE — ED NOTES
PICC RN at bedside; patient does not require a PICC line at this time r/t patient having two peripheral IVs. MD notified.       Choco Plata, RN  04/06/19 2011

## 2019-04-06 NOTE — PROGRESS NOTES
pt here from [facility] on Cardizem drip at 10ml/hr. /81 . TEMP oral 99.3 pt has chronic generalized pain and is moaning in pain 11/10. gave PRN morphine and does not seem to be working. Pt is diaphoretic all over at this time. pt denies chest pain. Cross cover Dr. Christian Jay has been messaged.

## 2019-04-06 NOTE — ED NOTES
Dr. Lyle Goldstein MD, made aware of patient's increased oxygen demand. Patient on 5L NC with SpO2 ranging 89-92%. Patient repositioned.       Melisa Burnett RN  04/06/19 0204

## 2019-04-06 NOTE — PROGRESS NOTES
Received patient [facility] ED crying in pain.  on cardizem gtt at 12.5 Muse of 6 on oxygen at 6 liters nasal cannula. Arch paged Dr Kattie Dakins for admission orders monitor number [id number] verified with the cmu with continuous pulse oximeter.

## 2019-04-06 NOTE — PROGRESS NOTES
RESPIRATORY THERAPY ASSESSMENT    Name:  Jessee Nation Record Number:  [id number]  Age: 53 y.o. Gender: female  : 1965  Today's Date:  2019  Room:  [address] [place] [address]    Assessment     Is the patient being admitted for a COPD or Asthma exacerbation? No   (If yes the patient will be seen every 4 hours for the first 24 hours and then reassessed)    Patient Admission Diagnosis      Allergies  Allergies   Allergen Reactions    Acetaminophen     Ativan [Lorazepam] Swelling    Codeine Hives    Darvocet [Propoxyphene N-Acetaminophen]     Methadone     Ultram [Tramadol] Swelling    Lodine [Etodolac] Rash       Minimum Predicted Vital Capacity:     n/a          Actual Vital Capacity:      n/a              Pulmonary History:No history  Home Oxygen Therapy:  unknown  Home Respiratory Therapy:Albuterol/Ipratropium Bromide HHN   Current Respiratory Therapy:  Duoneb HHN Q4          Respiratory Severity Index(RSI)   Patients with orders for inhalation medications, oxygen, or any therapeutic treatment modality will be placed on Respiratory Protocol. They will be assessed with the first treatment and at least every 72 hours thereafter. The following severity scale will be used to determine frequency of treatment intervention.     Smoking History: Pulmonary Disease or Smoking History, Greater than 15 pack year = 2    Social History  Social History     Tobacco Use    Smoking status: Current Every Day Smoker     Packs/day: 0.50     Years: 35.00     Pack years: 17.50     Types: Cigarettes    Smokeless tobacco: Never Used   Substance Use Topics    Alcohol use: No    Drug use: No       Recent Surgical History: None = 0  Past Surgical History  Past Surgical History:   Procedure Laterality Date    BLADDER SURGERY      CA    CYSTOSCOPY      transurethral resection bladder tumor    CYSTOSCOPY  13    TRANSURETHRAL RESECTION BLADDER TUMOR    CYSTOSCOPY  11-    Cystoscopy, bladder biopsy and fulgeration    TUBAL LIGATION         Level of Consciousness: Disoriented and Uncooperative = 2    Level of Activity: Non weight bearing- transfers bed to chair only = 3    Respiratory Pattern: Increased; RR 21-30 = 1    Breath Sounds: Diminshed bilaterally and/or crackles = 2    Sputum   ,  ,    Cough: Weak, non-productive = 3    Vital Signs   /83   Pulse 109   Temp 98.6 °F (37 °C) (Oral)   Resp 20   Wt 108 lb 3.2 oz (49.1 kg)   LMP 07/22/2013   SpO2 96%   Breastfeeding? No   BMI 21.13 kg/m²   SPO2 (COPD values may differ): 86-87% on room air or greater than 92% on FiO2 35- 50% = 3    Peak Flow (asthma only): not applicable = 0    RSI: 99-96 = Q4 (every four hours)        Plan       Goals: medication delivery, mobilize retained secretions, volume expansion and improve oxygenation    Patient/caregiver was educated on the proper method of use for Respiratory Care Devices:  Yes      Level of patient/caregiver understanding able to:   ? Verbalize understanding   ? Demonstrate understanding       ? Teach back        ? Needs reinforcement       ? No available caregiver               ? Other:     Response to education:   Ladson Keith    Is patient being placed on Home Treatment Regimen? Yes     Does the patient have everything they need prior to discharge? NA     Comments: Evaluated pt and reviewed chart. Plan of Care: Duoneb HHN Q4 PRN    Electronically signed by Maryan Escobar RCP on 4/6/2019 at 7:47 PM    Respiratory Protocol Guidelines     1. Assessment and treatment by Respiratory Therapy will be initiated for medication and therapeutic interventions upon initiation of aerosolized medication. 2. Physician will be contacted for respiratory rate (RR) greater than 35 breaths per minute. Therapy will be held for heart rate (HR) greater than 140 beats per minute, pending direction from physician.   3. Bronchodilators will be administered via Metered Dose Inhaler (MDI) with spacer when the following criteria are met:  a. Alert and cooperative     b. HR < 140 bpm  c. RR < 30 bpm                d. Can demonstrate a 2-3 second inspiratory hold  4. Bronchodilators will be administered via Hand Held Nebulizer [facility]) to patients when ANY of the following criteria are met  a. Incognizant or uncooperative          b. Patients treated with HHN at Home        c. Unable to demonstrate proper use of MDI with spacer     d. RR > 30 bpm   5. Bronchodilators will be delivered via Metered Dose Inhaler (MDI), HHN, Aerogen to intubated patients on mechanical ventilation. 6. Inhalation medication orders will be delivered and/or substituted as outlined below. Aerosolized Medications Ordering and Administration Guidelines:    1. All Medications will be ordered by a physician, and their frequency and/or modality will be adjusted as defined by the patients Respiratory Severity Index (RSI) score. 2. If the patient does not have documented COPD, consider discontinuing anticholinergics when RSI is less than 9.  3. If the bronchospasm worsens (increased RSI), then the bronchodilator frequency can be increased to a maximum of every 4 hours. If greater than every 4 hours is required, the physician will be contacted. 4. If the bronchospasm improves, the frequency of the bronchodilator can be decreased, based on the patient's RSI, but not less than home treatment regimen frequency. 5. Bronchodilator(s) will be discontinued if patient has a RSI less than 9 and has received no scheduled or as needed treatment for 72  Hrs. Patients Ordered on a Mucolytic Agent:    1. Must always be administered with a bronchodilator. 2. Discontinue if patient experiences worsened bronchospasm, or secretions have lessened to the point that the patient is able to clear them with a cough. Anti-inflammatory and Combination Medications:    1.  If the patient lacks prior history of lung disease, is not using inhaled anti-inflammatory medication at home, and lacks wheezing by examination or by history for at least 24 hours, contact physician for possible discontinuation.

## 2019-04-07 PROBLEM — G93.40 ACUTE ENCEPHALOPATHY: Status: ACTIVE | Noted: 2019-04-07

## 2019-04-07 PROBLEM — I74.09 CHRONIC DISTAL AORTIC OCCLUSION (HCC): Status: ACTIVE | Noted: 2019-04-07

## 2019-04-07 PROBLEM — N39.0 UTI (URINARY TRACT INFECTION): Status: ACTIVE | Noted: 2019-04-07

## 2019-04-07 PROBLEM — A41.9 SEVERE SEPSIS (HCC): Status: ACTIVE | Noted: 2019-04-07

## 2019-04-07 PROBLEM — G40.909 SEIZURE DISORDER (HCC): Status: ACTIVE | Noted: 2019-04-07

## 2019-04-07 PROBLEM — R65.20 SEVERE SEPSIS (HCC): Status: ACTIVE | Noted: 2019-04-07

## 2019-04-07 LAB
ALBUMIN SERPL-MCNC: 2.6 G/DL (ref 3.4–5)
ALP BLD-CCNC: 94 U/L (ref 40–129)
ALT SERPL-CCNC: 40 U/L (ref 10–40)
ANION GAP SERPL CALCULATED.3IONS-SCNC: 12 MMOL/L (ref 3–16)
AST SERPL-CCNC: 62 U/L (ref 15–37)
BILIRUB SERPL-MCNC: <0.2 MG/DL (ref 0–1)
BILIRUBIN DIRECT: <0.2 MG/DL (ref 0–0.3)
BILIRUBIN, INDIRECT: ABNORMAL MG/DL (ref 0–1)
BUN BLDV-MCNC: 22 MG/DL (ref 7–20)
CALCIUM SERPL-MCNC: 7.9 MG/DL (ref 8.3–10.6)
CHLORIDE BLD-SCNC: 103 MMOL/L (ref 99–110)
CO2: 24 MMOL/L (ref 21–32)
CREAT SERPL-MCNC: <0.5 MG/DL (ref 0.6–1.1)
ESTIMATED AVERAGE GLUCOSE: 111.2 MG/DL
GFR AFRICAN AMERICAN: >60
GFR NON-AFRICAN AMERICAN: >60
GLUCOSE BLD-MCNC: 122 MG/DL (ref 70–99)
GLUCOSE BLD-MCNC: 141 MG/DL (ref 70–99)
GLUCOSE BLD-MCNC: 143 MG/DL (ref 70–99)
GLUCOSE BLD-MCNC: 190 MG/DL (ref 70–99)
GLUCOSE BLD-MCNC: 192 MG/DL (ref 70–99)
HBA1C MFR BLD: 5.5 %
HCT VFR BLD CALC: 25.5 % (ref 36–48)
HEMOGLOBIN: 7.7 G/DL (ref 12–16)
LACTIC ACID: 1.6 MMOL/L (ref 0.4–2)
LACTIC ACID: 2.2 MMOL/L (ref 0.4–2)
MCH RBC QN AUTO: 22.3 PG (ref 26–34)
MCHC RBC AUTO-ENTMCNC: 30 G/DL (ref 31–36)
MCV RBC AUTO: 74.2 FL (ref 80–100)
PDW BLD-RTO: 20.6 % (ref 12.4–15.4)
PERFORMED ON: ABNORMAL
PLATELET # BLD: 300 K/UL (ref 135–450)
PMV BLD AUTO: 8.2 FL (ref 5–10.5)
POTASSIUM REFLEX MAGNESIUM: 3.7 MMOL/L (ref 3.5–5.1)
RBC # BLD: 3.44 M/UL (ref 4–5.2)
SODIUM BLD-SCNC: 139 MMOL/L (ref 136–145)
TOTAL PROTEIN: 5.4 G/DL (ref 6.4–8.2)
TSH REFLEX: 2.83 UIU/ML (ref 0.27–4.2)
URINE CULTURE, ROUTINE: NORMAL
WBC # BLD: 15.6 K/UL (ref 4–11)

## 2019-04-07 PROCEDURE — 2700000000 HC OXYGEN THERAPY PER DAY

## 2019-04-07 PROCEDURE — 6360000002 HC RX W HCPCS: Performed by: FAMILY MEDICINE

## 2019-04-07 PROCEDURE — 2060000000 HC ICU INTERMEDIATE R&B

## 2019-04-07 PROCEDURE — 6370000000 HC RX 637 (ALT 250 FOR IP): Performed by: FAMILY MEDICINE

## 2019-04-07 PROCEDURE — 80076 HEPATIC FUNCTION PANEL: CPT

## 2019-04-07 PROCEDURE — 85027 COMPLETE CBC AUTOMATED: CPT

## 2019-04-07 PROCEDURE — 2580000003 HC RX 258: Performed by: FAMILY MEDICINE

## 2019-04-07 PROCEDURE — 94761 N-INVAS EAR/PLS OXIMETRY MLT: CPT

## 2019-04-07 PROCEDURE — 6360000002 HC RX W HCPCS: Performed by: INTERNAL MEDICINE

## 2019-04-07 PROCEDURE — 80048 BASIC METABOLIC PNL TOTAL CA: CPT

## 2019-04-07 PROCEDURE — 83605 ASSAY OF LACTIC ACID: CPT

## 2019-04-07 PROCEDURE — 99223 1ST HOSP IP/OBS HIGH 75: CPT | Performed by: PSYCHIATRY & NEUROLOGY

## 2019-04-07 PROCEDURE — 36415 COLL VENOUS BLD VENIPUNCTURE: CPT

## 2019-04-07 PROCEDURE — 2580000003 HC RX 258: Performed by: NURSE PRACTITIONER

## 2019-04-07 PROCEDURE — 2580000003 HC RX 258: Performed by: INTERNAL MEDICINE

## 2019-04-07 PROCEDURE — 99223 1ST HOSP IP/OBS HIGH 75: CPT | Performed by: INTERNAL MEDICINE

## 2019-04-07 RX ORDER — LIDOCAINE HYDROCHLORIDE 10 MG/ML
5 INJECTION, SOLUTION INFILTRATION; PERINEURAL ONCE
Status: DISCONTINUED | OUTPATIENT
Start: 2019-04-07 | End: 2019-04-13

## 2019-04-07 RX ORDER — SODIUM CHLORIDE 9 MG/ML
INJECTION, SOLUTION INTRAVENOUS
Status: DISPENSED
Start: 2019-04-07 | End: 2019-04-08

## 2019-04-07 RX ORDER — SODIUM CHLORIDE 0.9 % (FLUSH) 0.9 %
10 SYRINGE (ML) INJECTION PRN
Status: DISCONTINUED | OUTPATIENT
Start: 2019-04-07 | End: 2019-04-16 | Stop reason: HOSPADM

## 2019-04-07 RX ORDER — 0.9 % SODIUM CHLORIDE 0.9 %
500 INTRAVENOUS SOLUTION INTRAVENOUS ONCE
Status: COMPLETED | OUTPATIENT
Start: 2019-04-07 | End: 2019-04-07

## 2019-04-07 RX ORDER — SODIUM CHLORIDE 0.9 % (FLUSH) 0.9 %
10 SYRINGE (ML) INJECTION EVERY 12 HOURS SCHEDULED
Status: DISCONTINUED | OUTPATIENT
Start: 2019-04-07 | End: 2019-04-16 | Stop reason: HOSPADM

## 2019-04-07 RX ADMIN — BACLOFEN 20 MG: 10 TABLET ORAL at 20:14

## 2019-04-07 RX ADMIN — Medication 3 MG: at 20:14

## 2019-04-07 RX ADMIN — AMANTADINE HYDROCHLORIDE 100 MG: 50 SOLUTION ORAL at 20:13

## 2019-04-07 RX ADMIN — PIPERACILLIN AND TAZOBACTAM 3.38 G: 3; .375 INJECTION, POWDER, FOR SOLUTION INTRAVENOUS at 16:31

## 2019-04-07 RX ADMIN — ASPIRIN 81 MG 81 MG: 81 TABLET ORAL at 09:38

## 2019-04-07 RX ADMIN — PIPERACILLIN AND TAZOBACTAM 3.38 G: 3; .375 INJECTION, POWDER, FOR SOLUTION INTRAVENOUS at 03:24

## 2019-04-07 RX ADMIN — ACYCLOVIR SODIUM 490 MG: 50 INJECTION, SOLUTION INTRAVENOUS at 13:25

## 2019-04-07 RX ADMIN — Medication 1000 MG: at 09:53

## 2019-04-07 RX ADMIN — SODIUM CHLORIDE 500 ML: 9 INJECTION, SOLUTION INTRAVENOUS at 12:05

## 2019-04-07 RX ADMIN — SODIUM CHLORIDE: 9 INJECTION, SOLUTION INTRAVENOUS at 03:29

## 2019-04-07 RX ADMIN — Medication 1000 MG: at 20:13

## 2019-04-07 RX ADMIN — LACOSAMIDE 100 MG: 50 TABLET, FILM COATED ORAL at 20:31

## 2019-04-07 RX ADMIN — Medication 1 TABLET: at 10:12

## 2019-04-07 RX ADMIN — AMANTADINE HYDROCHLORIDE 100 MG: 50 SOLUTION ORAL at 10:12

## 2019-04-07 RX ADMIN — GABAPENTIN 200 MG: 250 SOLUTION ORAL at 09:48

## 2019-04-07 RX ADMIN — TIZANIDINE 2 MG: 4 TABLET ORAL at 14:00

## 2019-04-07 RX ADMIN — BACLOFEN 20 MG: 10 TABLET ORAL at 14:00

## 2019-04-07 RX ADMIN — ACYCLOVIR SODIUM 490 MG: 50 INJECTION, SOLUTION INTRAVENOUS at 20:13

## 2019-04-07 RX ADMIN — APIXABAN 5 MG: 5 TABLET, FILM COATED ORAL at 09:42

## 2019-04-07 RX ADMIN — PIPERACILLIN AND TAZOBACTAM 3.38 G: 3; .375 INJECTION, POWDER, FOR SOLUTION INTRAVENOUS at 09:46

## 2019-04-07 RX ADMIN — BACLOFEN 20 MG: 10 TABLET ORAL at 09:38

## 2019-04-07 RX ADMIN — LACOSAMIDE 100 MG: 50 TABLET, FILM COATED ORAL at 09:52

## 2019-04-07 RX ADMIN — PIPERACILLIN AND TAZOBACTAM 3.38 G: 3; .375 INJECTION, POWDER, FOR SOLUTION INTRAVENOUS at 21:20

## 2019-04-07 RX ADMIN — PREDNISONE 30 MG: 10 TABLET ORAL at 09:38

## 2019-04-07 RX ADMIN — SENNOSIDES AND DOCUSATE SODIUM 1 TABLET: 8.6; 5 TABLET ORAL at 20:30

## 2019-04-07 RX ADMIN — OXYCODONE HYDROCHLORIDE AND ACETAMINOPHEN 500 MG: 500 TABLET ORAL at 20:14

## 2019-04-07 RX ADMIN — ATORVASTATIN CALCIUM 80 MG: 80 TABLET, FILM COATED ORAL at 20:14

## 2019-04-07 RX ADMIN — OXYCODONE HYDROCHLORIDE AND ACETAMINOPHEN 500 MG: 500 TABLET ORAL at 09:38

## 2019-04-07 RX ADMIN — HYDROMORPHONE HYDROCHLORIDE 1 MG: 2 INJECTION INTRAMUSCULAR; INTRAVENOUS; SUBCUTANEOUS at 06:33

## 2019-04-07 RX ADMIN — FLUOXETINE HYDROCHLORIDE 20 MG: 20 SOLUTION ORAL at 09:48

## 2019-04-07 RX ADMIN — TIZANIDINE 2 MG: 4 TABLET ORAL at 20:15

## 2019-04-07 RX ADMIN — SODIUM CHLORIDE, PRESERVATIVE FREE 10 ML: 5 INJECTION INTRAVENOUS at 09:45

## 2019-04-07 RX ADMIN — DOCUSATE SODIUM 100 MG: 50 LIQUID ORAL at 09:38

## 2019-04-07 RX ADMIN — OLANZAPINE 2.5 MG: 5 TABLET, FILM COATED ORAL at 20:14

## 2019-04-07 RX ADMIN — APIXABAN 5 MG: 5 TABLET, FILM COATED ORAL at 20:14

## 2019-04-07 RX ADMIN — VANCOMYCIN HYDROCHLORIDE 750 MG: 750 INJECTION, POWDER, LYOPHILIZED, FOR SOLUTION INTRAVENOUS at 18:46

## 2019-04-07 RX ADMIN — GABAPENTIN 200 MG: 250 SOLUTION ORAL at 14:34

## 2019-04-07 RX ADMIN — TIZANIDINE 2 MG: 4 TABLET ORAL at 10:13

## 2019-04-07 RX ADMIN — FOLIC ACID 1 MG: 1 TABLET ORAL at 09:38

## 2019-04-07 RX ADMIN — OLANZAPINE 2.5 MG: 5 TABLET, FILM COATED ORAL at 09:38

## 2019-04-07 RX ADMIN — QUETIAPINE FUMARATE 25 MG: 25 TABLET ORAL at 20:14

## 2019-04-07 RX ADMIN — GABAPENTIN 200 MG: 250 SOLUTION ORAL at 20:13

## 2019-04-07 ASSESSMENT — PAIN DESCRIPTION - LOCATION
LOCATION: GENERALIZED

## 2019-04-07 ASSESSMENT — PAIN DESCRIPTION - PAIN TYPE
TYPE: CHRONIC PAIN

## 2019-04-07 ASSESSMENT — PAIN SCALES - GENERAL
PAINLEVEL_OUTOF10: 10
PAINLEVEL_OUTOF10: 10
PAINLEVEL_OUTOF10: 0
PAINLEVEL_OUTOF10: 10
PAINLEVEL_OUTOF10: 10

## 2019-04-07 NOTE — PROGRESS NOTES
Q12H     PRN Meds: sodium chloride flush, acetaminophen, sodium chloride flush, magnesium hydroxide, ondansetron, acetaminophen, HYDROmorphone, LORazepam, ipratropium-albuterol, glucose, dextrose, glucagon (rDNA), dextrose      Intake/Output Summary (Last 24 hours) at 4/7/2019 1150  Last data filed at 4/7/2019 1024  Gross per 24 hour   Intake 2350 ml   Output 1575 ml   Net 775 ml       Physical Exam Performed:    BP 86/60   Pulse 83   Temp 98 °F (36.7 °C) (Axillary)   Resp 18   Wt 107 lb 9.6 oz (48.8 kg)   LMP 07/22/2013   SpO2 94%   Breastfeeding? No   BMI 21.01 kg/m²     General appearance:  Restless   HEENT:  Normal cephalic, atraumatic without obvious deformity. Pupils equal, round, and reactive to light. Extra ocular muscles intact. Conjunctivae/corneas clear. Neck: Supple, with full range of motion. No jugular venous distention. Trachea midline. Respiratory:  Normal respiratory effort. Clear to auscultation, bilaterally without Rales/Wheezes/Rhonchi. Cardiovascular:  Regular rate and rhythm with normal S1/S2 without murmurs, rubs or gallops. Abdomen: Soft, non-tender, non-distended with normal bowel sounds. Musculoskeletal:  No clubbing, cyanosis or edema bilaterally.    Skin: decub ulcer   Chr mottling from hips and below   Neurologic: b/l UE weakness, no voluntary movement in legs   Blind      Psychiatric:  Alert   Capillary Refill: Brisk,< 3 seconds   Peripheral Pulses: +2 palpable, equal bilaterally           Labs:   Recent Labs     04/06/19 0914 04/07/19 0443   WBC 37.0* 15.6*   HGB 9.4* 7.7*   HCT 33.6* 25.5*   * 300     Recent Labs     04/06/19 0914 04/07/19 0443    139   K 5.0 3.7   CL 99 103   CO2 21 24   BUN 26* 22*   CREATININE 0.9 <0.5*   CALCIUM 9.9 7.9*     Recent Labs     04/06/19 0914 04/07/19 0443   AST 18 62*   ALT 32 40   BILIDIR  --  <0.2   BILITOT <0.2 <0.2   ALKPHOS 148* 94     Recent Labs     04/06/19 0914   INR 1.50*     Recent Labs     04/06/19 0914 04/06/19  1556 04/06/19  2129   TROPONINI 0.14* 0.10* 0.09*       Urinalysis:      Lab Results   Component Value Date    NITRU Negative 04/06/2019    WBCUA >100 04/06/2019    BACTERIA 4+ 04/06/2019    RBCUA see below 04/06/2019    BLOODU LARGE 04/06/2019    SPECGRAV 1.025 04/06/2019    GLUCOSEU Negative 04/06/2019       Radiology:  VL Extremity Venous Right    (Results Pending)           Assessment/Plan:    Active Hospital Problems    Diagnosis Date Noted    Chronic distal aortic occlusion ([facility] [address] [I74.09] 04/07/2019    Severe sepsis ([facility] [address] [A41.9, R65.20] 04/07/2019    UTI (urinary tract infection) [N39.0] 04/07/2019    Seizure disorder ([facility] [address] [G40.909] 04/07/2019    SVT (supraventricular tachycardia) ([facility]) [I47.1] 04/06/2019     Severe sepsis, present on arrival. Possible UTI vs pneumonia vs meningitis  - marked tachycardia, leukocytosis on presentation  - lactate 3.3 initially, improved to 2.2 with IVF  - continue IVF  - on vanc, zosyn. Added acyclovir  - Low threshold to transfer to ICU for worsening sepsis  - WBC improving, afebrile  - unable to obtain LP due to eliquis use  - severe tachycardia improved. Off cardizem gtt. Unclear if SVT or severe sinus tachycardia  - CT abd/pel without source of infection noted. Seizure disorder  - last seizure while in ED. No activity noted overnight  - persistent decreased mental status  - continue vimpat, keppra, gabapentin  - neurology consulted, appreciate recs  - PRN ativan    Acute hypoxic respiratory failure 2/2 above  - on 6L NC. On room air prior to admission  - will wean as able  - nebs PRN ordered  - if worsening respiratory status, will check CT chest    Acute multifactorial encephalopathy with baseline vascular dementia  - sepsis vs post-ictal vs meningitis  - continue to treat sepsis. No seizure activity noted overnight  - largely non-verbal at baseline.  Came from ECF  - continue to monitor closely    Chronic distal aortic occlusion, Hx of CVA  - stable on CT abd/pel  - continue eliquis  - possibly due to large vessel vasculitis  - Continue steroids, ASA, statin    Dysphagia  - continue nightly TF via PEG, tolerating well    DVT Prophylaxis: eliquis  Diet: DIET GENERAL; Dysphagia II Mechanically Altered  Diet Tube Feed Continuous/Cyclic w/ Diet  Code Status: Full Code    PT/OT Eval Status: not ordered    Dispo - 2-3 days pending clinical improvement    Claudell Garner, MD

## 2019-04-07 NOTE — PROGRESS NOTES
Cardiac consult called @ [phone] 4/6/19  & neurology consult called @ 1600 4/6/19 both by Gary Little (see consult orders). -[facility]

## 2019-04-07 NOTE — CONSULTS
[address]                                  CONSULTATION    PATIENT NAME: Jen Anderson                :        1965  MED REC NO:   [id number]                          ROOM:       0425  ACCOUNT NO:   [de-identified]                           ADMIT DATE: 2019  PROVIDER:     Gregory Luna MD    CONSULT DATE:  2019    CARDIAC ELECTROPHYSIOLOGY CONSULTATION    REASON FOR CONSULTATION:  Tachycardia. HISTORY OF PRESENT ILLNESS:  The patient is a 53-year-old woman with a  history of remote CVA, who is admitted from the assisted care facility  with lethargy and diaphoresis. The patient had large CVA in 2017 and  has a very limited functional status since that time, she resides in an  assisted care facility. She does not have any known structural heart  disease, but does have peripheral vascular disease with distal aortic  occlusion. She was brought to the Emergency Department at [facility], where the initial ECG demonstrated a regular narrow QRS  complex tachycardia at 160 beats per minute. This was obscured by  artifact. Subsequent ECGs and ECG telemetry monitoring demonstrates  sinus tachycardia, which has eventually resolved. At the time of  admission, she had evidence for urinary tract infection and has been  treated with antibiotics. She was treated briefly with IV diltiazem,  but this was discontinued. She is currently receiving IV fluids for  arterial hypotension. PAST MEDICAL HISTORY:  1.  Status post CVA. 2.  Bladder cancer. 3.  Osteoarthritis.     MEDICATIONS AT THE TIME OF ADMISSION:  Include, amantadine 100 mg per  G-tube every 12 hours, apixaban 5 mg per G-tube b.i.d., aspirin 81 mg  per G-tube q.d., Lipitor 80 mg per G-tube q.h.s., Prozac 20 mg per  G-tube q.d., folic acid 1 mg per G-tube q.d., Neurontin 100 mg per  G-tube q.8 hours, Keppra 1000 mg per G-tube b.i.d., methotrexate 20 mg  subcu q.2 days per week, Zyprexa 2.5 mg q.12 hours, prednisone 30 mg per  G-tube q.d., Seroquel 25 mg per G-tube q.h.s., Bactrim 160 mg per G-tube  three times a week, Zanaflex 2 mg per G-tube t.i.d., morphine 10 mg per  G-tube q.4 hours as needed. ALLERGIES:  Include, ACETAMINOPHEN, ATIVAN, CODEINE, DARVOCET,  METHADONE, ULTRAM, and LODINE. SOCIAL HISTORY:  The patient has a history of cigarette smoking. She  does not consume alcohol at this time. FAMILY HISTORY:  Remarkable for stroke in the mother, heart disease in  the father. REVIEW OF SYSTEMS:  Cannot be obtained at this time. The patient is not  able to respond appropriately to questions. PHYSICAL EXAMINATION:  VITAL SIGNS:  Blood pressure is 101/66, heart rate is 81 beats per  minute and regular. GENERAL:  The patient is awake, but unable to respond appropriately to  questions. She is arousable. HEENT:  Exam demonstrates normocephalic and atraumatic head. There is  no scleral icterus. Pupils are round and reactive. NECK:  Supple without thyromegaly. LUNGS:  Clear anteriorly. CARDIOVASCULAR:  Exam reveals a regular rate and rhythm. The apical  impulse is brisk. S1 and S2 are normal.  There is no audible murmur or  gallop. The jugular venous pressure is difficult to assess. ABDOMEN:  Lean, soft, and nontender. EXTREMITIES:  Demonstrate decerebrate posturing with no pitting  pretibial dependent edema. RADIOLOGY DATA:  A 12-lead ECG from 04/06/2019 at 9:34 a.m. demonstrates  sinus tachycardia at 157 beats per minute. There is right axis  deviation. [place] interval is shortened. IMPRESSION:  1. Sepsis, likely related to urinary tract infection. 2.  Sinus tachycardia. 3.  Remote CVA. 4.  Peripheral vascular disease. The patient presents with urosepsis. She has sinus tachycardia, which  is more evident on ECG telemetry monitoring.   The initial ECGs were  obscured by motion artifact and were difficult to interpret. She has no  need for IV diltiazem at this time. She is currently receiving a fluid  bolus for arterial hypotension. The slight elevation of troponin is  likely related to supply-demand mismatch. She also had elevated  troponin level in 01/2019. At this time, I would concentrate on  supportive measures and treatment of her infectious illness. Her sinus  rate has already declined appropriately with treatment of the underlying  issues. RECOMMENDATIONS:  1. Supportive care. 2.  Antibiotics for presumed urosepsis. 3.  Continue ECG telemetry monitoring. 4.  Would not continue IV diltiazem. Thanks for the opportunity to assist in the care of this patient. Please contact me if you have any questions regarding her evaluation.         Josh Forman MD    D: 04/07/2019 12:27:39       T: 04/07/2019 15:10:30     [email or web address]  Job#: [id number]     Doc#: [id number]    CC:

## 2019-04-07 NOTE — FLOWSHEET NOTE
04/07/19 0818   Assessment   Charting Type Shift assessment   Neurological   Neuro (WDL) X   Level of Consciousness 0   Orientation Level Oriented to person;Disoriented to place; Disoriented to time;Disoriented to situation   Cognition Poor judgement;Poor safety awareness;Poor attention/concentration; Follows commands  (able to follow commands at times)   Language Expressive aphasia   Vinson Coma Scale   Eye Opening 4   Best Verbal Response 4   Best Motor Response 6   Vinson Coma Scale Score 14   Respiratory   Respiratory (WDL) X   Respiratory Quality/Effort Unlabored   Chest Assessment Chest expansion symmetrical   Breath Sounds   Right Upper Lobe Rhonchi;Diminished   Right Middle Lobe Rhonchi;Diminished   Right Lower Lobe Rhonchi;Diminished   Left Upper Lobe Rhonchi;Diminished   Left Lower Lobe Rhonchi;Diminished   Cardiac   Cardiac (WDL) X   Cardiac Regularity Regular   Heart Sounds S1, S2   Cardiac Rhythm ST   Rhythm Interpretation   Pulse 99   Cardiac Monitor   Telemetry Monitor On Yes   Telemetry Audible Yes   Telemetry Alarms Set Yes   Gastrointestinal   Abdominal (WDL) X   Abdomen Inspection Rounded;Taut;Other (Comment)  (PEG tube)   Tenderness Nontender   RUQ Bowel Sounds Active   LUQ Bowel Sounds Active   RLQ Bowel Sounds Active   LLQ Bowel Sounds Active   Peripheral Vascular   Peripheral Vascular (WDL) X   Edema Right upper extremity   Sensation RUE CARLOS   RUE Neurovascular Assessment   Capillary Refill Greater than 3 seconds   Color Pink   Temperature Warm   R Radial Pulse +2   Skin Color/Condition   Skin Color/Condition (WDL) X   Skin Integrity   Skin Integrity (WDL) X   Musculoskeletal   Musculoskeletal (WDL) X   RUE Weakness; Contracture   LUE Weakness; Contracture   RL Extremity Weakness   LL Extremity Weakness  (foot drop)   Genitourinary   Genitourinary (WDL) X  (randle)   Flank Tenderness No   Suprapubic Tenderness No   Dysuria CARLOS   Anus/Rectum   Anus/Rectum (WDL) WDL   Wound 04/06/19 Sacrum 6 cm

## 2019-04-07 NOTE — CONSULTS
In patient Neurology consult        [facility]      MD Dilip Gagnonualdo Plants  1965    Date of Service: 4/7/2019    Referring Physician: Sathya Rivera MD    Most of the history was obtained from detailed chart reviewing and discussion with the patient's physician . The patient is currently confused and unable to provide me with any history. I was also able to review her outside records and prior records from different hospitalization which was somewhat extensive. Reason for the consult: Acute encephalopathy    HPI:   The patient is a 53y.o.  years old female with very complicated and multiple medical history including history of prior CVA, chronic encephalopathy with baseline nonverbal, vasculopathy, seizure disorder and epilepsy, decubitus ulceration and aortic aneurysm with thrombosis who was admitted to [facility] from outside emergency room with encephalopathy. Symptoms started yesterday morning. She came in to [facility] ED from her nursing home with acute encephalopathy, tachycardia and breakthrough seizure. Symptoms were severe and sudden. Unknown duration. No triggers or other associated symptoms. She came to the ED for evaluation. In the ED, she was witnessed to have generalized motor seizure for few minutes. Initial CT that showed no acute findings. She was started on antibiotics and transferred to [facility] for further workup. Today the patient is obtunded. Unable to give me any more history. White count on admission yesterday was 37 and today 16. She was diagnosed with sepsis and possible urosepsis. She is currently on vancomycin and Zosyn. She is also on Eliquis for history of DVT in the past.   AED include Keppra 1000 mg twice daily, Vimpat 100 mg twice daily and gabapentin to 200 mg tid.            Past Medical History:   Diagnosis Date    Anorexia     Arthritis     RA    Bulging lumbar disc     C. difficile diarrhea 09/30/2017    Cancer [facility])     bladder CA 2014    Cerebral artery occlusion with cerebral infarction [facility])     x3 2017    Chronic kidney disease     bladder ca    Hypertriglyceridemia 10/5/2017    Post traumatic stress disorder     Seasonal allergies      Family History   Problem Relation Age of Onset    Stroke Mother     Heart Disease Father         pacemaker     Past Surgical History:   Procedure Laterality Date    BLADDER SURGERY      [place]    CYSTOSCOPY      transurethral resection bladder tumor    CYSTOSCOPY  6-13-13    TRANSURETHRAL RESECTION BLADDER TUMOR    CYSTOSCOPY  11-    Cystoscopy, bladder biopsy and fulgeration    TUBAL LIGATION       Past Surgical History:   Procedure Laterality Date    BLADDER SURGERY      [place]    CYSTOSCOPY      transurethral resection bladder tumor    CYSTOSCOPY  6-13-13    TRANSURETHRAL RESECTION BLADDER TUMOR    CYSTOSCOPY  11-    Cystoscopy, bladder biopsy and fulgeration    TUBAL LIGATION       Family History   Problem Relation Age of Onset    Stroke Mother     Heart Disease Father         pacemaker     Social History     Tobacco Use    Smoking status: Current Every Day Smoker     Packs/day: 0.50     Years: 35.00     Pack years: 17.50     Types: Cigarettes    Smokeless tobacco: Never Used   Substance Use Topics    Alcohol use: No    Drug use: No     Allergies   Allergen Reactions    Acetaminophen     Ativan [Lorazepam] Swelling    Codeine Hives    Darvocet [Propoxyphene N-Acetaminophen]     Methadone     Ultram [Tramadol] Swelling    Lodine [Etodolac] Rash     Current Facility-Administered Medications   Medication Dose Route Frequency Provider Last Rate Last Dose    acetaminophen (TYLENOL) tablet 650 mg  650 mg Per G Tube Q6H PRN Shukri Meng MD        amantadine (SYMMETREL) solution 100 mg  100 mg Per G Tube Q12H Shukri Meng MD   100 mg at 04/07/19 1012    apixaban (ELIQUIS) tablet 5 mg  5 mg Per G Tube BID Shukri Meng MD   5 mg at vitamin C (ASCORBIC ACID) tablet 500 mg  500 mg Per G Tube BID Jaylin Wright MD   500 mg at 04/07/19 0938    sodium chloride flush 0.9 % injection 10 mL  10 mL Intravenous 2 times per day Jaylin Wright MD   10 mL at 04/07/19 0945    sodium chloride flush 0.9 % injection 10 mL  10 mL Intravenous PRN Jaylin Wright MD        magnesium hydroxide (MILK OF MAGNESIA) 400 MG/5ML suspension 30 mL  30 mL Oral Daily PRN Jaylin Wright MD        ondansetron (ZOFRAN) injection 4 mg  4 mg Intravenous Q6H PRN Jaylin Wright MD        acetaminophen (TYLENOL) tablet 650 mg  650 mg Oral Q4H PRN Jaylin Wright MD        diltiazem 125 mg in dextrose 5 % 125 mL infusion  5 mg/hr Intravenous Continuous Jaylin Wright MD 5 mL/hr at 04/07/19 0858 5 mg/hr at 04/07/19 0858    HYDROmorphone (DILAUDID) injection 1 mg  1 mg Intravenous Q4H PRN Jaylin Wright MD   1 mg at 04/07/19 9023    0.9 % sodium chloride infusion   Intravenous Continuous CARISSA Mcconnell  mL/hr at 04/07/19 0329      piperacillin-tazobactam (ZOSYN) 3.375 g in dextrose 5 % 50 mL IVPB (mini-bag)  3.375 g Intravenous Q6H Jaylin Wright  mL/hr at 04/07/19 0946 3.375 g at 04/07/19 0946    vancomycin (VANCOCIN) 750 mg in dextrose 5 % 250 mL IVPB  750 mg Intravenous Q12H Jaylin Wright MD        LORazepam (ATIVAN) injection 1 mg  1 mg Intravenous Q10 Min PRN Jaylin Wright MD        ipratropium-albuterol (DUONEB) nebulizer solution 3 mL  1 vial Inhalation Q4H PRN Jaylin Wright MD        glucose (GLUTOSE) 40 % oral gel 15 g  15 g Oral PRN CARISSA Mcconnell - CNP        dextrose 50 % solution 12.5 g  12.5 g Intravenous PRN CARISSA Mcconnell - CNP        glucagon (rDNA) injection 1 mg  1 mg Intramuscular PRN CARISSA Mcconnell CNP        dextrose 5 % solution  100 mL/hr Intravenous PRN CARISSA Mcconnell CNP           ROS: Limited due to MS changes or as per HPI.     Constitutional:   Vitals:    04/07/19 0606 04/07/19 0742 04/07/19 0818 04/07/19 0836   BP: 117/78   113/78   Pulse: 113  99 112   Resp:    18   Temp:    98.3 °F (36.8 °C)   TempSrc:    Axillary   SpO2: 94% 92%  96%   Weight:           General appearance: Obtunded  Eye: PRRR  Fundus: Funduscopic examination could not be performed due to patient's poor cooperation. Neck: supple  Cardiovascular:          No lower leg edema with good pulsation. Mental Status: The patient is obtunded. No response to painful or motor stimulation. Unable to assess fund of knowledge, memory or language due to encephalopathy and baseline nonverbal.   Cranial Nerves:   II: Visual fields: NT due to confusion. Pupils: equal, round, reactive to light  III,IV,VI: Extra Ocular Movements are intact. No nystagmus  V: Facial sensation : NT due to confusion  VII: Facial strength and movements: intact and symmetric  VIII: Hearing: NT due to confusion  IX: Palate elevation NT due to confusion  XI: Shoulder shrug: NT due to confusion  XII: Tongue movements: NT due to confusion  Musculoskeletal:  Diffuse generalized weakness with diffuse atrophy in both arms and legs. Tone: Normal tone. No rigidity. Reflexes: Diminished in both arms and legs  Planters: flexor bilaterally. Coordination, cerebellar and gait cannot be tested due to baseline weakness and encephalopathy.   Data:  LABS:   Lab Results   Component Value Date     04/07/2019    K 3.7 04/07/2019     04/07/2019    CO2 24 04/07/2019    BUN 22 04/07/2019    CREATININE <0.5 04/07/2019    GFRAA >60 04/07/2019    GFRAA >60 05/09/2013    LABGLOM >60 04/07/2019    GLUCOSE 192 04/07/2019    CALCIUM 7.9 04/07/2019     Lab Results   Component Value Date    WBC 15.6 04/07/2019    RBC 3.44 04/07/2019    HGB 7.7 04/07/2019    HCT 25.5 04/07/2019    MCV 74.2 04/07/2019    RDW 20.6 04/07/2019     04/07/2019     Lab Results   Component Value Date    INR 1.50 (H) 04/06/2019

## 2019-04-08 ENCOUNTER — APPOINTMENT (OUTPATIENT)
Dept: INTERVENTIONAL RADIOLOGY/VASCULAR | Age: 54
DRG: 720 | End: 2019-04-08
Attending: INTERNAL MEDICINE
Payer: COMMERCIAL

## 2019-04-08 ENCOUNTER — APPOINTMENT (OUTPATIENT)
Dept: CT IMAGING | Age: 54
DRG: 720 | End: 2019-04-08
Attending: INTERNAL MEDICINE
Payer: COMMERCIAL

## 2019-04-08 LAB
GLUCOSE BLD-MCNC: 126 MG/DL (ref 70–99)
GLUCOSE BLD-MCNC: 137 MG/DL (ref 70–99)
GLUCOSE BLD-MCNC: 142 MG/DL (ref 70–99)
GLUCOSE BLD-MCNC: 144 MG/DL (ref 70–99)
GLUCOSE BLD-MCNC: 159 MG/DL (ref 70–99)
GLUCOSE BLD-MCNC: 98 MG/DL (ref 70–99)
HCT VFR BLD CALC: 25.7 % (ref 36–48)
HEMOGLOBIN: 7.6 G/DL (ref 12–16)
MCH RBC QN AUTO: 22.2 PG (ref 26–34)
MCHC RBC AUTO-ENTMCNC: 29.7 G/DL (ref 31–36)
MCV RBC AUTO: 74.5 FL (ref 80–100)
PDW BLD-RTO: 19.7 % (ref 12.4–15.4)
PERFORMED ON: ABNORMAL
PERFORMED ON: NORMAL
PLATELET # BLD: 268 K/UL (ref 135–450)
PMV BLD AUTO: 8.6 FL (ref 5–10.5)
RBC # BLD: 3.45 M/UL (ref 4–5.2)
WBC # BLD: 10.9 K/UL (ref 4–11)

## 2019-04-08 PROCEDURE — 92610 EVALUATE SWALLOWING FUNCTION: CPT

## 2019-04-08 PROCEDURE — 92526 ORAL FUNCTION THERAPY: CPT

## 2019-04-08 PROCEDURE — 2580000003 HC RX 258: Performed by: INTERNAL MEDICINE

## 2019-04-08 PROCEDURE — 93971 EXTREMITY STUDY: CPT

## 2019-04-08 PROCEDURE — 6370000000 HC RX 637 (ALT 250 FOR IP): Performed by: FAMILY MEDICINE

## 2019-04-08 PROCEDURE — 95816 EEG AWAKE AND DROWSY: CPT | Performed by: PSYCHIATRY & NEUROLOGY

## 2019-04-08 PROCEDURE — 70450 CT HEAD/BRAIN W/O DYE: CPT

## 2019-04-08 PROCEDURE — 94761 N-INVAS EAR/PLS OXIMETRY MLT: CPT

## 2019-04-08 PROCEDURE — 99233 SBSQ HOSP IP/OBS HIGH 50: CPT | Performed by: PSYCHIATRY & NEUROLOGY

## 2019-04-08 PROCEDURE — 95819 EEG AWAKE AND ASLEEP: CPT

## 2019-04-08 PROCEDURE — 99231 SBSQ HOSP IP/OBS SF/LOW 25: CPT | Performed by: NURSE PRACTITIONER

## 2019-04-08 PROCEDURE — 76942 ECHO GUIDE FOR BIOPSY: CPT

## 2019-04-08 PROCEDURE — 2580000003 HC RX 258: Performed by: NURSE PRACTITIONER

## 2019-04-08 PROCEDURE — 2700000000 HC OXYGEN THERAPY PER DAY

## 2019-04-08 PROCEDURE — 6360000002 HC RX W HCPCS: Performed by: INTERNAL MEDICINE

## 2019-04-08 PROCEDURE — 6360000002 HC RX W HCPCS: Performed by: FAMILY MEDICINE

## 2019-04-08 PROCEDURE — 85027 COMPLETE CBC AUTOMATED: CPT

## 2019-04-08 PROCEDURE — 2580000003 HC RX 258: Performed by: FAMILY MEDICINE

## 2019-04-08 PROCEDURE — 36415 COLL VENOUS BLD VENIPUNCTURE: CPT

## 2019-04-08 PROCEDURE — 2060000000 HC ICU INTERMEDIATE R&B

## 2019-04-08 RX ORDER — SODIUM CHLORIDE 9 MG/ML
INJECTION, SOLUTION INTRAVENOUS
Status: COMPLETED
Start: 2019-04-08 | End: 2019-04-09

## 2019-04-08 RX ADMIN — ASPIRIN 81 MG 81 MG: 81 TABLET ORAL at 10:49

## 2019-04-08 RX ADMIN — AMANTADINE HYDROCHLORIDE 100 MG: 50 SOLUTION ORAL at 22:27

## 2019-04-08 RX ADMIN — BACLOFEN 20 MG: 10 TABLET ORAL at 14:32

## 2019-04-08 RX ADMIN — ATORVASTATIN CALCIUM 80 MG: 80 TABLET, FILM COATED ORAL at 22:27

## 2019-04-08 RX ADMIN — OLANZAPINE 2.5 MG: 5 TABLET, FILM COATED ORAL at 22:27

## 2019-04-08 RX ADMIN — ACYCLOVIR SODIUM 490 MG: 50 INJECTION, SOLUTION INTRAVENOUS at 03:49

## 2019-04-08 RX ADMIN — Medication 10 ML: at 10:53

## 2019-04-08 RX ADMIN — AMANTADINE HYDROCHLORIDE 100 MG: 50 SOLUTION ORAL at 11:03

## 2019-04-08 RX ADMIN — TIZANIDINE 2 MG: 4 TABLET ORAL at 22:28

## 2019-04-08 RX ADMIN — GABAPENTIN 200 MG: 250 SOLUTION ORAL at 14:32

## 2019-04-08 RX ADMIN — TIZANIDINE 2 MG: 4 TABLET ORAL at 14:32

## 2019-04-08 RX ADMIN — APIXABAN 5 MG: 5 TABLET, FILM COATED ORAL at 10:49

## 2019-04-08 RX ADMIN — PREDNISONE 30 MG: 10 TABLET ORAL at 10:50

## 2019-04-08 RX ADMIN — OXYCODONE HYDROCHLORIDE AND ACETAMINOPHEN 500 MG: 500 TABLET ORAL at 10:48

## 2019-04-08 RX ADMIN — PIPERACILLIN AND TAZOBACTAM 3.38 G: 3; .375 INJECTION, POWDER, FOR SOLUTION INTRAVENOUS at 15:35

## 2019-04-08 RX ADMIN — OLANZAPINE 2.5 MG: 5 TABLET, FILM COATED ORAL at 10:49

## 2019-04-08 RX ADMIN — QUETIAPINE FUMARATE 25 MG: 25 TABLET ORAL at 22:28

## 2019-04-08 RX ADMIN — SODIUM CHLORIDE, PRESERVATIVE FREE 10 ML: 5 INJECTION INTRAVENOUS at 11:03

## 2019-04-08 RX ADMIN — SODIUM CHLORIDE: 9 INJECTION, SOLUTION INTRAVENOUS at 02:19

## 2019-04-08 RX ADMIN — PIPERACILLIN AND TAZOBACTAM 3.38 G: 3; .375 INJECTION, POWDER, FOR SOLUTION INTRAVENOUS at 10:47

## 2019-04-08 RX ADMIN — Medication 1 TABLET: at 10:49

## 2019-04-08 RX ADMIN — PIPERACILLIN AND TAZOBACTAM 3.38 G: 3; .375 INJECTION, POWDER, FOR SOLUTION INTRAVENOUS at 02:59

## 2019-04-08 RX ADMIN — OXYCODONE HYDROCHLORIDE AND ACETAMINOPHEN 500 MG: 500 TABLET ORAL at 22:27

## 2019-04-08 RX ADMIN — Medication 1000 MG: at 22:27

## 2019-04-08 RX ADMIN — APIXABAN 5 MG: 5 TABLET, FILM COATED ORAL at 22:27

## 2019-04-08 RX ADMIN — HYDROMORPHONE HYDROCHLORIDE 1 MG: 2 INJECTION INTRAMUSCULAR; INTRAVENOUS; SUBCUTANEOUS at 10:38

## 2019-04-08 RX ADMIN — VANCOMYCIN HYDROCHLORIDE 750 MG: 750 INJECTION, POWDER, LYOPHILIZED, FOR SOLUTION INTRAVENOUS at 05:57

## 2019-04-08 RX ADMIN — ACYCLOVIR SODIUM 490 MG: 50 INJECTION, SOLUTION INTRAVENOUS at 22:24

## 2019-04-08 RX ADMIN — SENNOSIDES AND DOCUSATE SODIUM 1 TABLET: 8.6; 5 TABLET ORAL at 22:27

## 2019-04-08 RX ADMIN — TIZANIDINE 2 MG: 4 TABLET ORAL at 10:50

## 2019-04-08 RX ADMIN — GABAPENTIN 200 MG: 250 SOLUTION ORAL at 11:14

## 2019-04-08 RX ADMIN — SODIUM CHLORIDE: 9 INJECTION, SOLUTION INTRAVENOUS at 15:40

## 2019-04-08 RX ADMIN — ACYCLOVIR SODIUM 490 MG: 50 INJECTION, SOLUTION INTRAVENOUS at 11:44

## 2019-04-08 RX ADMIN — LACOSAMIDE 100 MG: 50 TABLET, FILM COATED ORAL at 10:49

## 2019-04-08 RX ADMIN — SENNOSIDES AND DOCUSATE SODIUM 1 TABLET: 8.6; 5 TABLET ORAL at 10:49

## 2019-04-08 RX ADMIN — Medication 1000 MG: at 11:01

## 2019-04-08 RX ADMIN — PANTOPRAZOLE SODIUM 40 MG: 40 GRANULE, DELAYED RELEASE ORAL at 11:01

## 2019-04-08 RX ADMIN — FLUOXETINE HYDROCHLORIDE 20 MG: 20 SOLUTION ORAL at 11:02

## 2019-04-08 RX ADMIN — FOLIC ACID 1 MG: 1 TABLET ORAL at 10:49

## 2019-04-08 RX ADMIN — BACLOFEN 20 MG: 10 TABLET ORAL at 22:27

## 2019-04-08 RX ADMIN — BACLOFEN 20 MG: 10 TABLET ORAL at 10:49

## 2019-04-08 RX ADMIN — Medication 3 MG: at 22:27

## 2019-04-08 ASSESSMENT — PAIN DESCRIPTION - LOCATION
LOCATION: BACK
LOCATION: BACK

## 2019-04-08 ASSESSMENT — PAIN SCALES - GENERAL
PAINLEVEL_OUTOF10: 10
PAINLEVEL_OUTOF10: 10

## 2019-04-08 ASSESSMENT — PAIN DESCRIPTION - PAIN TYPE
TYPE: CHRONIC PAIN
TYPE: CHRONIC PAIN

## 2019-04-08 NOTE — PROGRESS NOTES
Hospitalist Progress Note      PCP: Christina Duffy    Date of Admission: 4/6/2019    Chief Complaint: lethargy    Hospital Course:   53 y.o. female with PMH of vasculopathy, infrarenal aortic occlusion-partial , CVA ,seizure,  non verbal , decub ulcer, PEG tube in place ,chr randle , chr pain -  presents from NH to [facility] ED with c/o being noted to be more lethargic and sweaty . In ED , pt was found to be tachycardic , controlled on cardizem gtt   Pt was noted to have a seizure and transferred to [facility]   Pt could not contribute much to history   C/o pain all over  her body     Subjective: Improving mental status; more alert and partially oriented. Afebrile. No further seizures noted.      Medications:  Reviewed    Infusion Medications    sodium chloride 100 mL/hr at 04/08/19 0219    dextrose       Scheduled Medications    acyclovir  10 mg/kg Intravenous Q8H    lidocaine 1 % injection  5 mL Intradermal Once    sodium chloride flush  10 mL Intravenous 2 times per day    amantadine  100 mg Per G Tube Q12H    apixaban  5 mg Per G Tube BID    aspirin  81 mg Per G Tube Daily    atorvastatin  80 mg Per G Tube Nightly    baclofen  20 mg Per G Tube TID    pantoprazole sodium  40 mg Per G Tube Daily    FLUoxetine  20 mg Per G Tube Daily    folic acid  1 mg Per G Tube Daily    gabapentin  200 mg Per G Tube Q8H    lacosamide  100 mg Per G Tube BID    levETIRAcetam  1,000 mg Per G Tube BID    melatonin  3 mg Per G Tube Nightly    therapeutic multivitamin-minerals  1 tablet Per G Tube Daily    OLANZapine  2.5 mg Oral Q12H    predniSONE  30 mg Per G Tube Daily    QUEtiapine  25 mg Per G Tube Nightly    sennosides-docusate sodium  1 tablet Per G Tube BID    tiZANidine  2 mg Per G Tube TID    vitamin C  500 mg Per G Tube BID    sodium chloride flush  10 mL Intravenous 2 times per day    piperacillin-tazobactam  3.375 g Intravenous Q6H    vancomycin  750 mg Intravenous Q12H     PRN Meds: sodium chloride flush, sodium chloride flush, magnesium hydroxide, ondansetron, HYDROmorphone, LORazepam, ipratropium-albuterol, glucose, dextrose, glucagon (rDNA), dextrose      Intake/Output Summary (Last 24 hours) at 4/8/2019 1250  Last data filed at 4/8/2019 1053  Gross per 24 hour   Intake 2553 ml   Output 1925 ml   Net 628 ml       Physical Exam Performed:    /64   Pulse 96   Temp 98.2 °F (36.8 °C)   Resp 18   Ht 5' 2\" (1.575 m) Comment: per CareEverywhere  Wt 113 lb 12.8 oz (51.6 kg)   LMP 07/22/2013   SpO2 99%   Breastfeeding? No   BMI 20.81 kg/m²     General appearance: Awake, NAD. HEENT:  Normal cephalic, atraumatic without obvious deformity. Pupils equal, round, and reactive to light. Extra ocular muscles intact. Conjunctivae/corneas clear. Neck: Supple, with full range of motion. No jugular venous distention. Trachea midline. Respiratory:  Normal respiratory effort. Clear to auscultation, bilaterally without Rales/Wheezes/Rhonchi. Cardiovascular:  Regular rate and rhythm with normal S1/S2 without murmurs, rubs or gallops. Abdomen: Soft, non-tender, non-distended with normal bowel sounds. Musculoskeletal:  No clubbing, cyanosis or edema bilaterally.    Skin: decub ulcer   Chr mottling from hips and below   Neurologic: b/l UE weakness with contractures, no voluntary movement in legs   Blind    Psychiatric:  Alert and more oriented, answers some question and follows some commands; overall still mildly encephalopathic   Capillary Refill: Brisk,< 3 seconds   Peripheral Pulses: +2 palpable, equal bilaterally           Labs:   Recent Labs     04/06/19 0914 04/07/19  0443 04/08/19  0431   WBC 37.0* 15.6* 10.9   HGB 9.4* 7.7* 7.6*   HCT 33.6* 25.5* 25.7*   * 300 268     Recent Labs     04/06/19  0914 04/07/19  0443    139   K 5.0 3.7   CL 99 103   CO2 21 24   BUN 26* 22*   CREATININE 0.9 <0.5*   CALCIUM 9.9 7.9*     Recent Labs     04/06/19  0914 04/07/19  0443   AST 18 62*   ALT [address]  --  <0.2   BILITOT <0.2 <0.2   ALKPHOS 148* 94     Recent Labs     04/06/19  0914   INR 1.50*     Recent Labs     04/06/19  0914 04/06/19  1556 04/06/19  2129   TROPONINI 0.14* 0.10* 0.09*       Urinalysis:      Lab Results   Component Value Date    NITRU Negative 04/06/2019    WBCUA >100 04/06/2019    BACTERIA 4+ 04/06/2019    RBCUA see below 04/06/2019    BLOODU LARGE 04/06/2019    SPECGRAV 1.025 04/06/2019    GLUCOSEU Negative 04/06/2019       Radiology:  IR ULTRASOUND GUIDANCE VASCULAR ACCESS   Preliminary Result   Unsuccessful attempt at placement of left upper extremity PICC line due to   lack of visualization of vessel of sufficient size to facilitate placement of   PICC line as described. VL Extremity Venous Right                 Assessment/Plan:    Active Hospital Problems    Diagnosis Date Noted    Chronic distal aortic occlusion ([facility] [address] [I74.09] 04/07/2019    Severe sepsis ([address] [A41.9, R65.20] 04/07/2019    UTI (urinary tract infection) [N39.0] 04/07/2019    Acute encephalopathy [G93.40] 04/07/2019    Encephalopathy, metabolic [D58.49]     Remote history of stroke [Z86.73]      Severe sepsis, present on arrival. Possible UTI vs pneumonia, less likely meningitis  - marked tachycardia, leukocytosis on presentation  - lactate 3.3 initially, improved to 2.2 with IVF  - Clinically improving, with no further leukocytosis, afebrile, improvingmentation  - on vanc, zosyn. Added acyclovir given possible meningitis, although can likely stop soon. - Pyuria but negative UCx (mixed shdai)  - CT abd/pel without source of infection noted. Seizure disorder  - last seizure while in ED. No activity noted overnight  - EEG shows diffuse encephalopathy. - continue vimpat, keppra, gabapentin  - neurology following    Acute hypoxic respiratory failure 2/2 above  - Wean O2 as tolerated.    - nebs PRN ordered    Acute multifactorial encephalopathy with baseline vascular dementia  - sepsis vs post-ictal, less likely meningitis  - largely non-verbal at baseline. Came from [facility]  - continue to treat sepsis. No further seizure activity noted  - Mentation improving    Tachycardia: Hx of same. Unclear if SVT or severe sinus tachycardia at present. Resolved. Cardiology consulted and now signed off. Chronic distal aortic occlusion, Hx of CVA  - stable on CT abd/pel  - continue eliquis  - possibly due to large vessel vasculitis  - Continue steroids, ASA, statin    Right Upper Extremity SVT: Likely 2/2 PIV. Already on [facility]. Continue Eliquis. Local care. Move IV to E ideally.      Dysphagia  - continue nightly TF via PEG, tolerating well    DVT Prophylaxis: eliquis  Diet: DIET GENERAL; Dysphagia II Mechanically Altered  Diet Tube Feed Continuous/Cyclic w/ Diet  Diet Tube Feed Modular: Protein Modular  Code Status: Full Code    PT/OT Eval Status: not ordered    Dispo - Likely 2-3 days pending clinical improvement    Emilio Sanders MD

## 2019-04-08 NOTE — CARE COORDINATION
Case Management  Progress Note      Keith Howell, Ana/Chirag Gomez called and informed writer pt came from their facility for skilled services. Facility will need updates if plans/recommendations for return. No HENS needed. Pt/familiy is agreeable to skilled therapy. Will continue to follow and awaiting recommendations/clinical course.

## 2019-04-08 NOTE — PROCEDURES
Patient: Meli Ordonez    MR Number: [id number]  YOB: 1965  Date of Visit: 4/8/2019    Clinical History:  The patient is a 53y.o. years old female with acute encephalopathy and recurrent seizures. Method: The EEG was performed utilizing the international 10/20 of electrode placements of both referential and bipolar montages. The patient was awake and drowsy through out the recording. Findings: The background of the EEG showed generalized diffuse slowing through recording at 4-6 HZ. This generalized slowing was symmetric, non rhythmical, and continuous. No spike or sharp waves were seen. Impression: This EEG is abnormal.  The generalized diffuse slowing is suggestive of moderate to severe diffuse encephalopathy. There is no evidence of epileptiform discharges, focal, or lateralizing abnormalities.       Shari Cannon MD      Board certified in neurology, clinical neurophysiology, and sleep medicine

## 2019-04-08 NOTE — PROGRESS NOTES
10 mL Intravenous PRN Candy Chang MD        magnesium hydroxide (MILK OF MAGNESIA) 400 MG/5ML suspension 30 mL  30 mL Oral Daily PRN Candy Chang MD        ondansetron (ZOFRAN) injection 4 mg  4 mg Intravenous Q6H PRN Candy Chang MD        HYDROmorphone (DILAUDID) injection 1 mg  1 mg Intravenous Q4H PRN Candy Chang MD   1 mg at 04/08/19 1038    0.9 % sodium chloride infusion   Intravenous Continuous Eladia Roles, APRN -  mL/hr at 04/08/19 0219      piperacillin-tazobactam (ZOSYN) 3.375 g in dextrose 5 % 50 mL IVPB (mini-bag)  3.375 g Intravenous Q6H Candy Chang  mL/hr at 04/08/19 1047 3.375 g at 04/08/19 1047    vancomycin (VANCOCIN) 750 mg in dextrose 5 % 250 mL IVPB  750 mg Intravenous Q12H Candy Chang MD   Stopped at 04/08/19 0730    LORazepam (ATIVAN) injection 1 mg  1 mg Intravenous Q10 Min PRN Candy Chang MD        ipratropium-albuterol (DUONEB) nebulizer solution 3 mL  1 vial Inhalation Q4H PRN Candy Chang MD        glucose (GLUTOSE) 40 % oral gel 15 g  15 g Oral PRN Eladia Roles, APRN - CNP        dextrose 50 % solution 12.5 g  12.5 g Intravenous PRN Eladia Roles, APRN - CNP        glucagon (rDNA) injection 1 mg  1 mg Intramuscular PRN [place] Roles, APRN - CNP        dextrose 5 % solution  100 mL/hr Intravenous PRN Eladia Roles, APRN - CNP           Objective:     Telemetry monitor: sinus/sinus tach    Physical Exam:  Constitutional and general appearance: uncooperative, delirious, no distress and appears older than stated age  HEENT: PERRL, no cervical lymphadenopathy. No masses palpable.  Normal oral mucosa  Respiratory:  · Normal excursion and expansion without use of accessory muscles  · Resp auscultation: Normal breath sounds without wheezing, rhonchi, and rales  Cardiovascular:  · The apical impulse is not displaced  · Heart tones are crisp and normal. regular S1 and S2.  · Jugular venous pulsation Normal  · The carotid upstroke is normal in amplitude and contour without delay or bruit  · Peripheral pulses are symmetrical and full   Abdomen:  · No masses or tenderness  · Bowel sounds present  Extremities:  ·  + foot drop  ·  Trace lower extremity edema  ·  Skin: warm and dry  Neurological:  · Awake but does not follow commands and does not answer questions appropriately    Data  EKG 4/6/2019:   Sinus tach     Echo 1/15/2019 ():  Left ventricle: The cavity size was normal. Wall thickness was increased in a pattern of mild LVH. Systolic function was normal.  The estimated ejection fraction was in the range of 60% to 65%. Wall motion was normal; there were no regional wall motion abnormalities. Left ventricular diastolic function parameters were normal.  - Aortic valve: Possibly bicuspid; normal thickness leaflets. - Left atrium: The atrium was mildly dilated. - Right ventricle: Systolic function was normal by objective interpretation. TAPSE: 2.2cm. Tricuspid annular systolic [id number]: 47AC/W.  - Pericardium, extracardiac: A trivial pericardial effusion was identified along the right ventricular free wall. All labs and testing reviewed.   Lab Review     Renal Profile:   Lab Results   Component Value Date    CREATININE <0.5 04/07/2019    BUN 22 04/07/2019     04/07/2019    K 3.7 04/07/2019     04/07/2019    CO2 24 04/07/2019     CBC:    Lab Results   Component Value Date    WBC 10.9 04/08/2019    RBC 3.45 04/08/2019    HGB 7.6 04/08/2019    HCT 25.7 04/08/2019    MCV 74.5 04/08/2019    RDW 19.7 04/08/2019     04/08/2019     BNP:  No results found for: BNP  Fasting Lipid Panel:  No results found for: CHOL, HDL, TRIG  Cardiac Enzymes:  CK/MbTroponin  Lab Results   Component Value Date    TROPONINI 0.09 04/06/2019     PT/ INR   Lab Results   Component Value Date    INR 1.50 04/06/2019    INR 1.68 01/12/2019    INR 1.18 12/03/2018    PROTIME 17.1 04/06/2019    PROTIME 19.1 01/12/2019    PROTIME 13.5 12/03/2018     PTT No results found for: PTT No results found for: MG No results found for: TSH    Assessment:  1. Sinus tachycardia: improving  2. Sepsis  3. CKD  4. History of CVA with chronic encephalopathy  6. History of DVT  7. Seizure disorder  8. PVD    Plan:   1. No changes  2. Sinus tach likely to continue to improve as sepsis improves    EP will sign off but remains available if needed.      Jonnie Stevenson, APRN-CNP  [address]  [phone]

## 2019-04-08 NOTE — PROGRESS NOTES
Neurology Follow up note       [facility] Neurology      MD Barbra Coffman  1965    Date of Service: 4/8/2019    Chart and lab reviewed. The patient looks better today. She is more awake and alert. She can follow direction. EEG showed  diffuse encephalopathy. No seizure overnight. No high-grade fever. White count today is 10. Other system was limited at this time.           Past Medical History:   Diagnosis Date    Anorexia     Arthritis     RA    Bulging lumbar disc     C. difficile diarrhea 09/30/2017    Cancer ([facility] [address] [address]     bladder CA 2014    Cerebral artery occlusion with cerebral infarction [facility])     x3 2017    Chronic kidney disease     bladder ca    Hypertriglyceridemia 10/5/2017    Post traumatic stress disorder     Seasonal allergies     Seizure disorder ([facility] [address] 4/7/2019    UTI (urinary tract infection) 4/7/2019     Family History   Problem Relation Age of Onset    Stroke Mother     Heart Disease Father         pacemaker     Past Surgical History:   Procedure Laterality Date    BLADDER SURGERY      [place]    CYSTOSCOPY      transurethral resection bladder tumor    CYSTOSCOPY  6-13-13    TRANSURETHRAL RESECTION BLADDER TUMOR    CYSTOSCOPY  11-    Cystoscopy, bladder biopsy and fulgeration    TUBAL LIGATION       Past Surgical History:   Procedure Laterality Date    BLADDER SURGERY      [place]    CYSTOSCOPY      transurethral resection bladder tumor    CYSTOSCOPY  6-13-13    TRANSURETHRAL RESECTION BLADDER TUMOR    CYSTOSCOPY  11-    Cystoscopy, bladder biopsy and fulgeration    TUBAL LIGATION       Family History   Problem Relation Age of Onset    Stroke Mother     Heart Disease Father         pacemaker     Social History     Tobacco Use    Smoking status: Current Every Day Smoker     Packs/day: 0.50     Years: 35.00     Pack years: 17.50     Types: Cigarettes    Smokeless tobacco: Never Used   Substance Use Topics    Alcohol use: No    Drug use: No     Allergies   Allergen Reactions    Acetaminophen     Ativan [Lorazepam] Swelling    Codeine Hives    Darvocet [Propoxyphene N-Acetaminophen]     Methadone     Ultram [Tramadol] Swelling    Lodine [Etodolac] Rash     Current Facility-Administered Medications   Medication Dose Route Frequency Provider Last Rate Last Dose    acyclovir (ZOVIRAX) 490 mg in dextrose 5 % 100 mL IVPB  10 mg/kg Intravenous Q8H Damien Magana MD   Stopped at 04/08/19 0450    lidocaine 1 % injection 5 mL  5 mL Intradermal Once Damien Magana MD   Stopped at 04/07/19 1705    sodium chloride flush 0.9 % injection 10 mL  10 mL Intravenous 2 times per day Damien Magana MD        sodium chloride flush 0.9 % injection 10 mL  10 mL Intravenous PRN Damien Magana MD        amantadine [facility] YAZOO) solution 100 mg  100 mg Per G Tube Q12H Juanito Gill MD   100 mg at 04/07/19 2013    apixaban (ELIQUIS) tablet 5 mg  5 mg Per G Tube BID Juanito Gill MD   5 mg at 04/07/19 2014    aspirin chewable tablet 81 mg  81 mg Per G Tube Daily Juanito Gill MD   81 mg at 04/07/19 0938    atorvastatin (LIPITOR) tablet 80 mg  80 mg Per G Tube Nightly Juanito Gill MD   80 mg at 04/07/19 2014    baclofen (LIORESAL) tablet 20 mg  20 mg Per G Tube TID Juanito Gill MD   20 mg at 04/07/19 2014    pantoprazole sodium (PROTONIX) packet 40 mg  40 mg Per G Tube Daily Juanito Gill MD   40 mg at 04/06/19 1724    FLUoxetine (PROZAC) 20 MG/5ML solution 20 mg  20 mg Per G Tube Daily Juanito Gill MD   20 mg at 28/69/60 3256    folic acid (FOLVITE) tablet 1 mg  1 mg Per G Tube Daily Juanito Gill MD   1 mg at 04/07/19 0938    gabapentin (NEURONTIN) 250 MG/5ML solution 200 mg  200 mg Per G Tube Q8H Juanito Gill MD   200 mg at 04/07/19 2013    lacosamide (VIMPAT) tablet 100 mg  100 mg Per G Tube BID Juanito Gill MD   100 mg at 04/07/19 2031    levETIRAcetam (KEPPRA) 100 MG/ML solution 1,000 mg  1,000 mg Per G Tube BID Nilton Lee MD   1,000 mg at 04/07/19 2013    melatonin tablet 3 mg  3 mg Per G Tube Nightly Nilton Lee MD   3 mg at 04/07/19 2014    therapeutic multivitamin-minerals 1 tablet  1 tablet Per G Tube Daily Nilton Lee MD   1 tablet at 04/07/19 1012    OLANZapine (ZYPREXA) tablet 2.5 mg  2.5 mg Oral Q12H Nilton Lee MD   2.5 mg at 04/07/19 2014    predniSONE (DELTASONE) tablet 30 mg  30 mg Per G Tube Daily Nilton Lee MD   30 mg at 04/07/19 5066    QUEtiapine (SEROQUEL) tablet 25 mg  25 mg Per G Tube Nightly Nilton Lee MD   25 mg at 04/07/19 2014    sennosides-docusate sodium (SENOKOT-S) 8.6-50 MG tablet 1 tablet  1 tablet Per G Tube BID Nilton Lee MD   1 tablet at 04/07/19 2030    tiZANidine (ZANAFLEX) tablet 2 mg  2 mg Per G Tube TID Nilton Lee MD   2 mg at 04/07/19 2015    vitamin C (ASCORBIC ACID) tablet 500 mg  500 mg Per G Tube BID Nilton Lee MD   500 mg at 04/07/19 2014    sodium chloride flush 0.9 % injection 10 mL  10 mL Intravenous 2 times per day Nilton Lee MD   10 mL at 04/07/19 0945    sodium chloride flush 0.9 % injection 10 mL  10 mL Intravenous PRN Nilton Lee MD        magnesium hydroxide (MILK OF MAGNESIA) 400 MG/5ML suspension 30 mL  30 mL Oral Daily PRN Nilton Lee MD        ondansetron (ZOFRAN) injection 4 mg  4 mg Intravenous Q6H PRN Nilton Lee MD        HYDROmorphone (DILAUDID) injection 1 mg  1 mg Intravenous Q4H PRN Nilton Lee MD   1 mg at 04/07/19 0633    0.9 % sodium chloride infusion   Intravenous Continuous CARISSA Shen -  mL/hr at 04/08/19 0219      piperacillin-tazobactam (ZOSYN) 3.375 g in dextrose 5 % 50 mL IVPB (mini-bag)  3.375 g Intravenous Q6H Nilton Lee MD   Stopped at 04/08/19 0350    vancomycin (VANCOCIN) 750 mg in dextrose 5 % 250 mL IVPB  750 mg Intravenous Q12H Nilton Lee MD   Stopped at 04/08/19 0730    LORazepam (ATIVAN) injection 1 mg  1 mg Intravenous Q10 Min PRN Carie Meneses MD        ipratropium-albuterol (DUONEB) nebulizer solution 3 mL  1 vial Inhalation Q4H PRN Carie Meneses MD        glucose (GLUTOSE) 40 % oral gel 15 g  15 g Oral PRN Jettie Median, APRN - CNP        dextrose 50 % solution 12.5 g  12.5 g Intravenous PRN Jettie Median, APRN - CNP        glucagon (rDNA) injection 1 mg  1 mg Intramuscular PRN Jettie Median, APRN - CNP        dextrose 5 % solution  100 mL/hr Intravenous PRN Jettie Median, APRN - CNP           ROS: Limited due to MS changes or as per HPI. Constitutional:   Vitals:    04/08/19 0557 04/08/19 0619 04/08/19 0757 04/08/19 0953   BP:  124/87  108/74   Pulse:       Resp:    18   Temp:    98.2 °F (36.8 °C)   TempSrc:       SpO2:       Weight: 113 lb 12.8 oz (51.6 kg)      Height:   5' 2\" (1.575 m)      General appearance:  she is waxing and waning but more awake and alert. Eye: PRRR  Neck: supple  Cardiovascular:         No lower leg edema with good pulsation. Mental Status: The patient is waxing and waning. Alert and awake and can follow simple direction. Soft Speech. Poor Attention and Concentration. Unable to assess fund of knowledge and memory  due to encephalopathy   Cranial Nerves:   II: Visual fields: NT due to confusion. Pupils: equal, round, reactive to light  III,IV,VI: Extra Ocular Movements are intact. No nystagmus  V: Facial sensation : NT due to confusion  VII: Facial strength and movements: intact and symmetric  Musculoskeletal:  Diffuse generalized weakness with diffuse atrophy in both arms and legs 2/5. Tone: Normal tone. No rigidity. Reflexes: Diminished in both arms and legs  Planters: flexor bilaterally. Coordination, cerebellar and gait cannot be tested due to baseline weakness and encephalopathy.         Data:  LABS:   Lab Results   Component Value Date     04/07/2019    K 3.7 04/07/2019     04/07/2019    CO2 24 04/07/2019    BUN 22 04/07/2019    CREATININE <0.5 04/07/2019    GFRAA >60 04/07/2019    GFRAA >60 05/09/2013    LABGLOM >60 04/07/2019    GLUCOSE 192 04/07/2019    CALCIUM 7.9 04/07/2019     Lab Results   Component Value Date    WBC 10.9 04/08/2019    RBC 3.45 04/08/2019    HGB 7.6 04/08/2019    HCT 25.7 04/08/2019    MCV 74.5 04/08/2019    RDW 19.7 04/08/2019     04/08/2019     Lab Results   Component Value Date    INR 1.50 (H) 04/06/2019    PROTIME 17.1 (H) 04/06/2019       Neuroimaging and/or  labs reviewed by me    Impression:   Acute encephalopathy, severe. Likely metabolic encephalopathy, underlying sepsis and cannot rule out possibility of CNS infection or meningoencephalitis. Improving compared to yesterday. White count is back to nl today  History of medically refractory epilepsy  Chronic encephalopathy  Sepsis  Remote strokes  Vasculopathy with recurrent DVT on anticoagulation and now with right UE DVT on Eliquis  Leukocytosis, improved. Recommendation:  CT head today NC  Continue the same dose of AED including Keppra, Vimpat and gabapentin  Aspiration precautions  Continue antibiotics  Continue Eliquis and follow on her DVT  Aspiration precautions  PT and OT  Continue Acyclovir for now. May consider discontinuation of such medication over the next 1-2 days  Follow CBC and CMP  Aspirin for stroke prevention  Statin  Blood pressure monitor  Will follow  DW Primary team and nurse        Thank you for referring such patient. If you have any questions regarding my consult note, please don't hesitate to call me. Darshan Alva MD  [phone]    This dictation was generated by voice recognition computer software.  Although all attempts are made to edit the dictation for accuracy, there may be errors in the  transcription that are not intended

## 2019-04-08 NOTE — PROGRESS NOTES
Nutrition Assessment (Enteral Nutrition)    Type and Reason for Visit: Initial, Positive Nutrition Screen    Nutrition Recommendations:   · Continue dysphagia 2 diet  · Monitor and encourage PO intake  · Recommend continue Pt's home TF regimen. Nocturnal feeds with Jevity 1.5 (1.5 calorie with fiber formula) at 25mL/hr and as tolerated, increase by 25 mL/hr q 4 hours until goal of 65 mL/hr x 12 hours ( 7p-7a) is met via PEG  · Recommend 30 mL H20 q 4 hours. Recommend reevaluate IV fluids at this time. Increase flush if Na increases greater than 145 mEq/L. · Recommend 1 Bottle Proteinex P2Go daily via syringe. Flush with 30 mL H20 before and after. Proteinex P2Go should not be mixed directly with the tube feeding formula. Monitor closely and correct lytes (K, Phos, Mg) before progressing TF to goal  · Monitor nutrition adequacy, weights, pertinent labs, BMs and clinical progress      Nutrition Assessment: Pt seen per [facility] for new TF. Pt is a 52 yo female admitted with SVT. Pt nutritionally compromised related to need for EN via PEG,altered texture diet and pressure wound. At risk for further nutrition decline related to increased nutrition needs for wound healing. Per chart review, pt on nocturnal TF of Jevity 1.5 @ 65 mL/hr x 12 hours, will continue. TF provides 75% pt's estimated nutrition needs. Spoke with RN, pt tolerating TF at goal over night and ate some of breakfast this AM. Will add 1 [id number] daily with TF to aid in wound healing. Will continue to monitor nutrition status. Malnutrition Assessment:  · Malnutrition Status:  At risk for malnutrition    Nutrition Risk Level: High    Nutrition Needs:  · Estimated Daily Total Kcal: 2877-9536  · Estimated Daily Protein (g): 63-78  · Estimated Daily Fluid (ml/day): 1ml/kcal or per MD    Nutrition Diagnosis:   · Problem: Increased nutrient needs(protein)  · Etiology: related to Increased demand for energy/nutrients     Signs and symptoms:  as

## 2019-04-08 NOTE — PLAN OF CARE
Nutrition Problem: Increased nutrient needs(protein)  Intervention: Food and/or Nutrient Delivery: Continue current diet, Continue current Tube Feeding  Nutritional Goals: Pt will receive 100% nutrition needs through TF and diet this admission

## 2019-04-08 NOTE — PROGRESS NOTES
[address] [address] [place], [place] [address]y 52 or Facility: [facility] From: [facility], VS RE: Julee Longoria 1965 RM: [id number] CRITICAL RESULT= right arm Doppler positive for superficial venous thrombosis right basilica vein. please review and advise.  ty Need Callback: NO CALLBACK REQ C4 PROGRESSIVE CARE  md aware no new orders

## 2019-04-08 NOTE — PROGRESS NOTES
Speech Language Pathology  Facility/Department: [facility] C4 PCU   BEDSIDE SWALLOW EVALUATION    NAME: Juan Miguel Benavidez  : 1965  MRN: [id number]    ADMISSION DATE: 2019  ADMITTING DIAGNOSIS: has Brachial-basilar insufficiency syndrome; Chronic low back pain; Decreased thyroid stimulating hormone (TSH) level; Hypertriglyceridemia; Episodic paroxysmal anxiety disorder; Cerebral artery occlusion with cerebral infarction ([address] Anorexia; Arthritis; Chronic distal aortic occlusion ([address] Severe sepsis ([address] UTI (urinary tract infection); Seizure disorder ([address] Encephalopathy, metabolic; and Remote history of stroke on their problem list.  ONSET DATE: Pt admitted to [facility] on 19    Recent Chest Xray/CT of Chest (19): Impression   Negative portable study. Date of Eval: 2019  Evaluating Therapist: Russell Yung    Current Diet level:  Current Diet : Mech soft  Current Liquid Diet : Thin    Primary Complaint  Patient Complaint: n/a    Pain:  Pain Assessment  Patient Currently in Pain: Yes(When repositioned upright in bed; pt with wound on bottom per RN)  Pain Type: Chronic pain  Pain Location: Back    Reason for Referral  Juan Miguel Benavidez was referred for a bedside swallow evaluation to assess the efficiency of her swallow function, identify signs and symptoms of aspiration and make recommendations regarding safe dietary consistencies, effective compensatory strategies, and safe eating environment. Impression  Dysphagia Diagnosis: Mild to moderate oral stage dysphagia;Mild pharyngeal stage dysphagia  Dysphagia Outcome Severity Scale: Level 3: Moderate dysphagia- Total assisstance, supervision or strategies. Two or more diet consistencies restricted   Pt seen partially reclined in bed (pt unable to tolerate full upright positioning 2/2 chronic back pain-- per RN pt with wound on bottom), alert and agreeable to evaluation, RN OK'd SLP entry and evaluation.   Per PCA, pt tolerated puree  Therapeutic Interventions: Diet tolerance monitoring;Patient/Family education; Therapeutic PO trials with SLP;Oral care    Compensatory Swallowing Strategies  Compensatory Swallowing Strategies: Alternate solids and liquids;Small bites/sips;Upright as possible for all oral intake; Total feed;Remain upright for 30-45 minutes after meals;Eat/Feed slowly    Treatment/Goals  Short-term Goals  Timeframe for Short-term Goals: 2-3 sessions  Long-term Goals  Timeframe for Long-term Goals: 1 week  Goal 1: The pt will tolerate safest and least restrictive diet without s/s of aspiration. Dysphagia Goals: The patient will tolerate recommended diet without observed clinical signs of aspiration; The patient/caregiver will demonstrate understanding of compensatory strategies for improved swallowing safety. ;The patient will tolerate regular consistency solids 10/10. General  Chart Reviewed: Yes  Comments: Pt with hx of seizures, CVAs per chart. Pt admitted with tachycardia, seizures. Pt s/p PEG placement 1/2019. Behavior/Cognition: Alert; Cooperative; Requires cueing  Respiratory Status: O2 via nasual cannula  O2 Device: Nasal cannula  Liters of Oxygen: 5 L  Communication Observation: Functional  Follows Directions: Simple  Dentition: Dentures top  Patient Positioning: Partially reclined(Pt unable to tolerate full upright positioning 2/2 reported back pain / wound on bottom per RN)  Baseline Vocal Quality: Weak  Volitional Cough: Weak  Volitional Swallow: Delayed  Prior Dysphagia History: Pt s/p PEG placement 1/2019. Pt has hx several MBSS completed in the past (2/26/19, 1/22/19, and 1/2/19 per chart) with most recent recommending Dysphagia III (advanced) diet with thin liquids, no penetration / aspiration observed. Pt on Dysphagia II (mechanical soft) diet with thin liquids at NH. Consistencies Administered: Mechanical soft;Puree; Thin - straw(Pt observed with TL trials via straw only during BSE 2/2 positioning )    Vision/Hearing  Vision  Vision: Within Functional Limits  Hearing  Hearing: Within functional limits    Oral Motor Deficits  Oral/Motor  Oral Motor: Exceptions to [facility]  Labial ROM: Reduced left; Reduced right  Labial Strength: Reduced  Labial Coordination: Reduced  Lingual ROM: Reduced left; Reduced right  Lingual Strength: Reduced  Lingual Coordination: Reduced    Oral Phase Dysfunction  Oral Phase  Oral Phase: Exceptions  Oral Phase Dysfunction  Suspected Premature Bolus Loss: All  Oral Phase  Oral Phase - Comment: Pt with top dentures in place for BSE. Weak lingual manipulation and reduced bolus control noted with all PO trials. Occasional audible swallow initiation noted with TL trials, suspect rapid spillover BOT. Indicators of Pharyngeal Phase Dysfunction   Pharyngeal Phase  Pharyngeal Phase: Exceptions  Indicators of Pharyngeal Phase Dysfunction  Decreased Laryngeal Elevation: All  Pharyngeal Phase   Pharyngeal: Occasional dry cough noted during BSE. Pt's O2 sats % throughout. Prognosis  Prognosis  Prognosis for safe diet advancement: fair  Barriers to reach goals: other (comment)  Barriers/Prognosis Comment: Inability to tolerate full upright positioning, hx of CVAs  Individuals consulted  Consulted and agree with results and recommendations: Patient;RN    Education  Patient Education: Pt educated on reason for referral, role of ST, assessment results and recommendations.   Patient Education Response: Verbalizes understanding;Needs reinforcement    Therapy Time  SLP Individual Minutes  Time In: 0464  Time Out: 1402  Minutes: 24 minutes; dysphagia eval and AIMEE [place]s  Speech-language pathologist  [id number]

## 2019-04-08 NOTE — CARE COORDINATION
Case Management  Progress Note      Met with pt at bedside. She was able to respond to some questions with \"yes\" or \"no' replies but has a difficult time talking. Pt gave writer permission to call daughter Alen Daly to complete assessment. Message left with Alen Daly  to discuss discharge plans/case management needs. Will continue to follow and coordinate. 3609 Addendum: Above number for Alen Daly is not correct. Called pt's sonTrinity [phone]. Correct number for Alen Daly is [phone]. CASE MANAGEMENT INITIAL ASSESSMENT      Reviewed chart and met with patient today, re: discharge plans/case management needs. Explained Case Management role/services. Information gathered from phone conversation with pt's son, Trinity Maria.    Family present: none  Primary contact information: Alen Daly [phone] (daughter)  Estrada Mckeon [phone] (son)    Admit date/status: 04/06/2019  Diagnosis: SVT    Insurance: [employer] required for SNF - Y      3 night stay required -  N    Living arrangements, Adls, care needs, prior to admission: Lives at home with pt's significant other, Howie Lee, and pt's daughter, Alen Daly. Primary caregivers/support are provided by Alen Daly and Francescagarima Lee; pt has someone at home with her around the clock. Pt's neighbors are also additional support and one of them is a nurse who has been a resource for pt and family. Transportation: to be determined    Durable Medical Equipment at home: cane, wheeled walker, and standard walker    Services in the home and/or outpatient, prior to admission: none. However, pt has been to [facility] in the past and a different facility in [place] a month ago. \"    PT/OT recs: not ordered at this time. Hospital Exemption Notification (HEN): not yet initiated. Barriers to discharge: awaiting clinical progress/care team recommendations. Plan/comments: Per son, family/pt is agreeable to rehab if needed.      ECOC on chart for MD signature

## 2019-04-08 NOTE — PLAN OF CARE
Problem: Nutrition  Intervention: Swallowing evaluation  Note:   Bedside swallow evaluation completed this date. Georgi Morales M.S. [id number] [facility]  Speech-language pathologist  [id number]      Intervention: Aspiration precautions  Note:   Bedside swallow evaluation completed this date.     Georgi Morales M.S. [id number] [facility]  Speech-language pathologist  [id number]

## 2019-04-08 NOTE — PROCEDURES
Evaluated patient for PICC placement. Patient cries out in pain when arm is moved. Basilic and cephalic veins too small for PICC. Brachial vein does not compress and becomes smaller when going up the arm.   Called report to Hermilo Booker.

## 2019-04-09 LAB
ANION GAP SERPL CALCULATED.3IONS-SCNC: 9 MMOL/L (ref 3–16)
BUN BLDV-MCNC: 12 MG/DL (ref 7–20)
C DIFFICILE TOXIN, EIA: NORMAL
CALCIUM SERPL-MCNC: 8.2 MG/DL (ref 8.3–10.6)
CHLORIDE BLD-SCNC: 109 MMOL/L (ref 99–110)
CO2: 26 MMOL/L (ref 21–32)
CREAT SERPL-MCNC: <0.5 MG/DL (ref 0.6–1.1)
GFR AFRICAN AMERICAN: >60
GFR NON-AFRICAN AMERICAN: >60
GLUCOSE BLD-MCNC: 112 MG/DL (ref 70–99)
GLUCOSE BLD-MCNC: 138 MG/DL (ref 70–99)
GLUCOSE BLD-MCNC: 141 MG/DL (ref 70–99)
GLUCOSE BLD-MCNC: 176 MG/DL (ref 70–99)
HCT VFR BLD CALC: 24.2 % (ref 36–48)
HEMOGLOBIN: 7.2 G/DL (ref 12–16)
MAGNESIUM: 1.8 MG/DL (ref 1.8–2.4)
MCH RBC QN AUTO: 22 PG (ref 26–34)
MCHC RBC AUTO-ENTMCNC: 29.6 G/DL (ref 31–36)
MCV RBC AUTO: 74.4 FL (ref 80–100)
PDW BLD-RTO: 19.9 % (ref 12.4–15.4)
PERFORMED ON: ABNORMAL
PLATELET # BLD: 276 K/UL (ref 135–450)
PMV BLD AUTO: 8.3 FL (ref 5–10.5)
POTASSIUM REFLEX MAGNESIUM: 2.9 MMOL/L (ref 3.5–5.1)
RBC # BLD: 3.25 M/UL (ref 4–5.2)
SODIUM BLD-SCNC: 144 MMOL/L (ref 136–145)
VANCOMYCIN TROUGH: 9.7 UG/ML (ref 10–20)
WBC # BLD: 8.8 K/UL (ref 4–11)

## 2019-04-09 PROCEDURE — 2580000003 HC RX 258: Performed by: INTERNAL MEDICINE

## 2019-04-09 PROCEDURE — 80202 ASSAY OF VANCOMYCIN: CPT

## 2019-04-09 PROCEDURE — 2700000000 HC OXYGEN THERAPY PER DAY

## 2019-04-09 PROCEDURE — 87449 NOS EACH ORGANISM AG IA: CPT

## 2019-04-09 PROCEDURE — 80048 BASIC METABOLIC PNL TOTAL CA: CPT

## 2019-04-09 PROCEDURE — 6360000002 HC RX W HCPCS: Performed by: INTERNAL MEDICINE

## 2019-04-09 PROCEDURE — 6370000000 HC RX 637 (ALT 250 FOR IP): Performed by: INTERNAL MEDICINE

## 2019-04-09 PROCEDURE — 94761 N-INVAS EAR/PLS OXIMETRY MLT: CPT

## 2019-04-09 PROCEDURE — 2580000003 HC RX 258

## 2019-04-09 PROCEDURE — 6360000002 HC RX W HCPCS: Performed by: FAMILY MEDICINE

## 2019-04-09 PROCEDURE — 36415 COLL VENOUS BLD VENIPUNCTURE: CPT

## 2019-04-09 PROCEDURE — 83735 ASSAY OF MAGNESIUM: CPT

## 2019-04-09 PROCEDURE — 6370000000 HC RX 637 (ALT 250 FOR IP): Performed by: FAMILY MEDICINE

## 2019-04-09 PROCEDURE — 87324 CLOSTRIDIUM AG IA: CPT

## 2019-04-09 PROCEDURE — 2580000003 HC RX 258: Performed by: FAMILY MEDICINE

## 2019-04-09 PROCEDURE — 99233 SBSQ HOSP IP/OBS HIGH 50: CPT | Performed by: PSYCHIATRY & NEUROLOGY

## 2019-04-09 PROCEDURE — 2580000003 HC RX 258: Performed by: NURSE PRACTITIONER

## 2019-04-09 PROCEDURE — 85027 COMPLETE CBC AUTOMATED: CPT

## 2019-04-09 PROCEDURE — 2060000000 HC ICU INTERMEDIATE R&B

## 2019-04-09 RX ORDER — POTASSIUM CHLORIDE 20 MEQ/1
40 TABLET, EXTENDED RELEASE ORAL 2 TIMES DAILY
Status: DISCONTINUED | OUTPATIENT
Start: 2019-04-09 | End: 2019-04-09

## 2019-04-09 RX ORDER — OXYCODONE HYDROCHLORIDE 5 MG/1
5 TABLET ORAL EVERY 4 HOURS PRN
Status: DISCONTINUED | OUTPATIENT
Start: 2019-04-09 | End: 2019-04-13

## 2019-04-09 RX ORDER — OLANZAPINE 5 MG/1
2.5 TABLET ORAL EVERY 12 HOURS
Status: DISCONTINUED | OUTPATIENT
Start: 2019-04-09 | End: 2019-04-16 | Stop reason: HOSPADM

## 2019-04-09 RX ORDER — POTASSIUM CHLORIDE 20 MEQ/1
60 TABLET, EXTENDED RELEASE ORAL 2 TIMES DAILY
Status: DISCONTINUED | OUTPATIENT
Start: 2019-04-09 | End: 2019-04-09 | Stop reason: DRUGHIGH

## 2019-04-09 RX ADMIN — ACYCLOVIR SODIUM 490 MG: 50 INJECTION, SOLUTION INTRAVENOUS at 08:15

## 2019-04-09 RX ADMIN — BACLOFEN 20 MG: 10 TABLET ORAL at 21:06

## 2019-04-09 RX ADMIN — APIXABAN 5 MG: 5 TABLET, FILM COATED ORAL at 10:43

## 2019-04-09 RX ADMIN — FOLIC ACID 1 MG: 1 TABLET ORAL at 10:44

## 2019-04-09 RX ADMIN — PREDNISONE 30 MG: 10 TABLET ORAL at 10:44

## 2019-04-09 RX ADMIN — Medication 1 TABLET: at 10:44

## 2019-04-09 RX ADMIN — TIZANIDINE 2 MG: 4 TABLET ORAL at 13:41

## 2019-04-09 RX ADMIN — OXYCODONE HYDROCHLORIDE AND ACETAMINOPHEN 500 MG: 500 TABLET ORAL at 21:06

## 2019-04-09 RX ADMIN — Medication 1000 MG: at 21:06

## 2019-04-09 RX ADMIN — Medication 3 MG: at 21:05

## 2019-04-09 RX ADMIN — OLANZAPINE 2.5 MG: 5 TABLET, FILM COATED ORAL at 10:42

## 2019-04-09 RX ADMIN — OXYCODONE HYDROCHLORIDE 5 MG: 5 TABLET ORAL at 21:06

## 2019-04-09 RX ADMIN — LACOSAMIDE 100 MG: 50 TABLET, FILM COATED ORAL at 21:06

## 2019-04-09 RX ADMIN — APIXABAN 5 MG: 5 TABLET, FILM COATED ORAL at 21:06

## 2019-04-09 RX ADMIN — PIPERACILLIN AND TAZOBACTAM 3.38 G: 3; .375 INJECTION, POWDER, FOR SOLUTION INTRAVENOUS at 05:41

## 2019-04-09 RX ADMIN — ACYCLOVIR SODIUM 490 MG: 50 INJECTION, SOLUTION INTRAVENOUS at 14:19

## 2019-04-09 RX ADMIN — POTASSIUM BICARBONATE 60 MEQ: 782 TABLET, EFFERVESCENT ORAL at 21:07

## 2019-04-09 RX ADMIN — Medication 10 ML: at 10:45

## 2019-04-09 RX ADMIN — HYDROMORPHONE HYDROCHLORIDE 1 MG: 2 INJECTION INTRAMUSCULAR; INTRAVENOUS; SUBCUTANEOUS at 22:18

## 2019-04-09 RX ADMIN — HYDROMORPHONE HYDROCHLORIDE 1 MG: 2 INJECTION INTRAMUSCULAR; INTRAVENOUS; SUBCUTANEOUS at 01:02

## 2019-04-09 RX ADMIN — ASPIRIN 81 MG 81 MG: 81 TABLET ORAL at 10:45

## 2019-04-09 RX ADMIN — SODIUM CHLORIDE: 9 INJECTION, SOLUTION INTRAVENOUS at 02:32

## 2019-04-09 RX ADMIN — SENNOSIDES AND DOCUSATE SODIUM 1 TABLET: 8.6; 5 TABLET ORAL at 21:06

## 2019-04-09 RX ADMIN — SENNOSIDES AND DOCUSATE SODIUM 1 TABLET: 8.6; 5 TABLET ORAL at 10:43

## 2019-04-09 RX ADMIN — VANCOMYCIN HYDROCHLORIDE 1000 MG: 1 INJECTION, POWDER, LYOPHILIZED, FOR SOLUTION INTRAVENOUS at 16:55

## 2019-04-09 RX ADMIN — PIPERACILLIN AND TAZOBACTAM 3.38 G: 3; .375 INJECTION, POWDER, FOR SOLUTION INTRAVENOUS at 18:19

## 2019-04-09 RX ADMIN — ATORVASTATIN CALCIUM 80 MG: 80 TABLET, FILM COATED ORAL at 21:06

## 2019-04-09 RX ADMIN — LACOSAMIDE 100 MG: 50 TABLET, FILM COATED ORAL at 01:26

## 2019-04-09 RX ADMIN — GABAPENTIN 200 MG: 250 SOLUTION ORAL at 01:03

## 2019-04-09 RX ADMIN — BACLOFEN 20 MG: 10 TABLET ORAL at 13:42

## 2019-04-09 RX ADMIN — VANCOMYCIN HYDROCHLORIDE 750 MG: 750 INJECTION, POWDER, LYOPHILIZED, FOR SOLUTION INTRAVENOUS at 01:12

## 2019-04-09 RX ADMIN — Medication 1000 MG: at 11:22

## 2019-04-09 RX ADMIN — GABAPENTIN 200 MG: 250 SOLUTION ORAL at 13:43

## 2019-04-09 RX ADMIN — HYDROMORPHONE HYDROCHLORIDE 1 MG: 2 INJECTION INTRAMUSCULAR; INTRAVENOUS; SUBCUTANEOUS at 06:38

## 2019-04-09 RX ADMIN — FLUOXETINE HYDROCHLORIDE 20 MG: 20 SOLUTION ORAL at 11:22

## 2019-04-09 RX ADMIN — AMANTADINE HYDROCHLORIDE 100 MG: 50 SOLUTION ORAL at 21:06

## 2019-04-09 RX ADMIN — PIPERACILLIN AND TAZOBACTAM 3.38 G: 3; .375 INJECTION, POWDER, FOR SOLUTION INTRAVENOUS at 13:38

## 2019-04-09 RX ADMIN — PIPERACILLIN AND TAZOBACTAM 3.38 G: 3; .375 INJECTION, POWDER, FOR SOLUTION INTRAVENOUS at 00:02

## 2019-04-09 RX ADMIN — TIZANIDINE 2 MG: 4 TABLET ORAL at 10:44

## 2019-04-09 RX ADMIN — PANTOPRAZOLE SODIUM 40 MG: 40 GRANULE, DELAYED RELEASE ORAL at 11:23

## 2019-04-09 RX ADMIN — HYDROMORPHONE HYDROCHLORIDE 1 MG: 2 INJECTION INTRAMUSCULAR; INTRAVENOUS; SUBCUTANEOUS at 10:46

## 2019-04-09 RX ADMIN — GABAPENTIN 200 MG: 250 SOLUTION ORAL at 11:29

## 2019-04-09 RX ADMIN — TIZANIDINE 2 MG: 4 TABLET ORAL at 21:06

## 2019-04-09 RX ADMIN — QUETIAPINE FUMARATE 25 MG: 25 TABLET ORAL at 21:05

## 2019-04-09 RX ADMIN — GABAPENTIN 200 MG: 250 SOLUTION ORAL at 22:18

## 2019-04-09 RX ADMIN — LACOSAMIDE 100 MG: 50 TABLET, FILM COATED ORAL at 10:44

## 2019-04-09 RX ADMIN — POTASSIUM BICARBONATE 60 MEQ: 782 TABLET, EFFERVESCENT ORAL at 11:39

## 2019-04-09 RX ADMIN — BACLOFEN 20 MG: 10 TABLET ORAL at 10:43

## 2019-04-09 RX ADMIN — SODIUM CHLORIDE: 900 INJECTION, SOLUTION INTRAVENOUS at 01:03

## 2019-04-09 RX ADMIN — OXYCODONE HYDROCHLORIDE AND ACETAMINOPHEN 500 MG: 500 TABLET ORAL at 10:43

## 2019-04-09 RX ADMIN — OLANZAPINE 2.5 MG: 5 TABLET, FILM COATED ORAL at 21:07

## 2019-04-09 RX ADMIN — AMANTADINE HYDROCHLORIDE 100 MG: 50 SOLUTION ORAL at 11:23

## 2019-04-09 ASSESSMENT — PAIN SCALES - GENERAL
PAINLEVEL_OUTOF10: 10
PAINLEVEL_OUTOF10: 10
PAINLEVEL_OUTOF10: 5
PAINLEVEL_OUTOF10: 5
PAINLEVEL_OUTOF10: 10
PAINLEVEL_OUTOF10: 8
PAINLEVEL_OUTOF10: 2
PAINLEVEL_OUTOF10: 10

## 2019-04-09 ASSESSMENT — PAIN DESCRIPTION - LOCATION: LOCATION: BACK

## 2019-04-09 ASSESSMENT — PAIN DESCRIPTION - PAIN TYPE: TYPE: CHRONIC PAIN

## 2019-04-09 NOTE — PROGRESS NOTES
Hospitalist Progress Note      PCP: Christina uDffy    Date of Admission: 4/6/2019    Chief Complaint: lethargy    Hospital Course:   53 y.o. female with PMH of vasculopathy, infrarenal aortic occlusion-partial , CVA ,seizure,  non verbal , decub ulcer, PEG tube in place ,chr randle , chr pain -  presents from [facility] to [facility] ED with c/o being noted to be more lethargic and sweaty . In ED , pt was found to be tachycardic , controlled on cardizem gtt   Pt was noted to have a seizure and transferred to [facility]   Pt could not contribute much to history   C/o pain all over  her body     Subjective: Continues to show improving alertness. Partially oriented. However, yells out in pain intermittently then stops to answer questions. States pain in decubitus ulcer site; IV pain meds not lasting long enough.        Medications:  Reviewed    Infusion Medications    sodium chloride 100 mL/hr at 04/09/19 0232    dextrose       Scheduled Medications    OLANZapine  2.5 mg PEG Tube Q12H    potassium bicarb-citric acid  60 mEq Per G Tube BID    vancomycin  1,000 mg Intravenous Q12H    acyclovir  10 mg/kg Intravenous Q8H    lidocaine 1 % injection  5 mL Intradermal Once    sodium chloride flush  10 mL Intravenous 2 times per day    amantadine  100 mg Per G Tube Q12H    apixaban  5 mg Per G Tube BID    aspirin  81 mg Per G Tube Daily    atorvastatin  80 mg Per G Tube Nightly    baclofen  20 mg Per G Tube TID    pantoprazole sodium  40 mg Per G Tube Daily    FLUoxetine  20 mg Per G Tube Daily    folic acid  1 mg Per G Tube Daily    gabapentin  200 mg Per G Tube Q8H    lacosamide  100 mg Per G Tube BID    levETIRAcetam  1,000 mg Per G Tube BID    melatonin  3 mg Per G Tube Nightly    therapeutic multivitamin-minerals  1 tablet Per G Tube Daily    predniSONE  30 mg Per G Tube Daily    QUEtiapine  25 mg Per G Tube Nightly    sennosides-docusate sodium  1 tablet Per G Tube BID    tiZANidine  2 mg Per G Tube TID CREATININE <0.5* <0.5*   CALCIUM 7.9* 8.2*     Recent Labs     04/07/19  0443   AST 62*   ALT 40   BILIDIR <0.2   BILITOT <0.2   ALKPHOS 94     No results for input(s): INR in the last 72 hours. Recent Labs     04/06/19  1556 04/06/19  2129   TROPONINI 0.10* 0.09*       Urinalysis:      Lab Results   Component Value Date    NITRU Negative 04/06/2019    WBCUA >100 04/06/2019    BACTERIA 4+ 04/06/2019    RBCUA see below 04/06/2019    BLOODU LARGE 04/06/2019    SPECGRAV 1.025 04/06/2019    GLUCOSEU Negative 04/06/2019       Radiology:  CT HEAD WO CONTRAST   Final Result   No acute intracranial abnormality. IR ULTRASOUND GUIDANCE VASCULAR ACCESS   Preliminary Result   Unsuccessful attempt at placement of left upper extremity PICC line due to   lack of visualization of vessel of sufficient size to facilitate placement of   PICC line as described. VL Extremity Venous Right   Final Result            Assessment/Plan:    Active Hospital Problems    Diagnosis Date Noted    Chronic distal aortic occlusion ([facility] [I74.09] 04/07/2019    Severe sepsis ([facility] [A41.9, R65.20] 04/07/2019    UTI (urinary tract infection) [N39.0] 04/07/2019    Seizure disorder ([facility] [G40.909] 04/07/2019    Encephalopathy, metabolic [U30.62]     Remote history of stroke [Z86.73]      Severe sepsis, present on arrival. Possible UTI vs pneumonia, less likely meningitis  - marked tachycardia, leukocytosis on presentation  - lactate 3.3 initially, improved to 2.2 with IVF  - Clinically improving, with no further leukocytosis, afebrile, improvingmentation  - on vanc, zosyn. Added acyclovir given possible meningitis, although can stop given low suspicion. - Pyuria but negative UCx (mixed shadi)  - CT abd/pel without source of infection noted. Seizure disorder  - last seizure while in ED. No activity noted overnight  - EEG shows diffuse encephalopathy.    - continue vimpat, keppra, gabapentin  - neurology following    Acute hypoxic respiratory failure 2/2 above  - Wean O2 as tolerated. - nebs PRN ordered    Acute multifactorial encephalopathy with baseline vascular dementia  - sepsis vs post-ictal, less likely meningitis  - largely non-verbal at baseline. Came from [facility]  - continue to treat sepsis. No further seizure activity noted  - Mentation improving    Tachycardia: Hx of same. Unclear if SVT or severe sinus tachycardia at present. Resolved. Cardiology consulted and now signed off. Chronic distal aortic occlusion, Hx of CVA  - stable on CT abd/pel  - continue eliquis  - possibly due to large vessel vasculitis  - Continue steroids, ASA, statin    Right Upper Extremity SVT: Likely 2/2 PIV. Already on [facility]. Continue Eliquis. Local care. Move IV to LUE ideally. Dysphagia  - continue nightly TF via PEG, tolerating well    Decubitus Ulcer: Wound care. Pain control. DVT Prophylaxis: eliquis  Diet: DIET GENERAL; Dysphagia II Mechanically Altered  Diet Tube Feed Modular: Protein Modular  Dietary Nutrition Supplements: Frozen Oral Supplement  Diet Tube Feed Continuous/Cyclic w/ Diet  Code Status: Full Code    PT/OT Eval Status: not ordered    Dispo - 2-3 days pending further clinical improvement and return to baseline mentation.      Nora Velasco MD

## 2019-04-09 NOTE — PROGRESS NOTES
Bedside report received from [facility]. Patient resting comfortably in bed. No signs of discomfort or distress. Patient is alert, not oriented. Responds to touch and voice. Bed is in lowest position, wheels locked, 2/2 side rails up. Bedside table and call light within reach. White board updated. Will continue to monitor patient. Era Cm RN    11:01 PM  Shift assessment complete. (See findings in flowsheet). Med pass complete. (See MAR). VSS. IV antibiotics are all off scheduled. 1800 doses not given, so put evening doses behind. Patient has poor access, will only run one med at a time to preserve IV. Pharmacy is aware, and will watch to retime future doses of meds. Patient resting in bed comfortably. No signs or symptoms of distress or discomfort noted at this time. Bed in lowest position, brakes locked. Nonskid footwear in place. 5 P's addressed, no needs at this time. Will continue to monitor. Continuous night tube feed started. Era Cm RN    Vanc trough not drawn today. Per pharmacy, re-timed for tomorrow morning. Okay to give dose of vanc tonight. Era Cm RN    1:26 AM  Patient restless, yelling out. PRN dilaudid given for pain. Era Cm RN    5:50 AM  Message sent to Dr Rabia Quijano. Critical result from lab, K+ 2.9. No replacement orders in EPIC at this time. Era Cm RN    6:41 AM  Patient crying out very loudly, very agitated/restless. C/o \"pain everywhere\". Cursing at me to give her something for pain. CM[facility] calling to say that HR is elevated, up to 130's. Other vitals are stable. PRN dilaudid given at this time. Era Cm RN    HR jumps up when patient cries and yells out. When asked why shes crying, she says \"because I want to\". When asked what is bothering her, she says \"nothing, I just want to carry on\". Patient dozed off while this RN at bedside and HR now back down to .  Era Cm RN

## 2019-04-09 NOTE — PROGRESS NOTES
Nutrition Assessment (Enteral Nutrition)    Type and Reason for Visit: Reassess(new tube feeding)    Nutrition Recommendations:   Jevity 1.5 at 65 ml/hr continuous with water flush 100 ml every 4 hours  Dysphagia 2 mechanically altered, total feed  Magic cup with meals  Will monitor TF intake and tolerance, blood sugar trends, and fluid and electrolyte balances  Will monitor nutritional adequacy, nutrition-related labs, weights, BMs, and clinical progress   If patient voices desire to consume more oral diet and would like tube feeding titrated down to enhance appetite, recommend transitioning back to 12 hour infusion    Nutrition Assessment: Follow up:  Nutritional status at risk for decline with increased nutrient needs and isk for high risk for aspiration. Jevity 1.5 still infusing at 65 ml/hr with water flush 100 ml every 4 hours. Normal saline at 100 ml/hr. KCL ordered this am due to K 2.9. Speech recommended Dysphagia 2 mechanically altered on 4/8 mild to moderate oral and pharyngeal dysphagia. Will add magic cup with meals. Malnutrition Assessment:  · Malnutrition Status: At risk for malnutrition  · Context: Acute illness or injury  · Findings of the 6 clinical characteristics of malnutrition (Minimum of 2 out of 6 clinical characteristics is required to make the diagnosis of moderate or severe Protein Calorie Malnutrition based on AND/ASPEN Guidelines):  1. Energy Intake-(TF meeting 75% EER), Greater than or equal to 7 days    2. Weight Loss-No significant weight loss,    3. Fat Loss-(unsure baseline),    4. Muscle Loss-Unable to assess,    5. Fluid Accumulation-Unable to assess,    6.   Strength-Not measured    Nutrition Risk Level: High    Nutrition Needs:  · Estimated Daily Total Kcal: 8160-6003  · Estimated Daily Protein (g): 63-78  · Estimated Daily Fluid (ml/day): 1ml/kcal or per MD    Nutrition Diagnosis:   · Problem: Increased nutrient needs(protein)  · Etiology: related to

## 2019-04-09 NOTE — DISCHARGE INSTR - COC
Continuity of Care Form    Patient Name: Hao Remy   :  1965  MRN:  [id number]    Admit date:  2019  Discharge date:  19    Code Status Order: Full Code   Advance Directives:   [facility] Documentation     Date/Time Healthcare Directive Type of Healthcare Directive Copy in [facility] [address] Agent's Name Healthcare Agent's Phone Number    [phone]  Unknown, patient unable to respond due to medical condition -- -- -- -- --          Admitting Physician:  Saba Pina MD  PCP: [address] [place] [address]    Discharging Nurse: [facility]Room#: 7415/6247-46  Discharging Unit Phone Number: [phone]    Emergency Contact:   Extended Emergency Contact Information  Primary Emergency Contact: Siomara Conn Phone: [phone]  Mobile Phone: [phone]  Relation: Child  Secondary Emergency Contact: Vincent Londono  [place] Phone: [phone]  Relation: Child    Past Surgical History:  Past Surgical History:   Procedure Laterality Date    BLADDER SURGERY      [place]    CYSTOSCOPY      transurethral resection bladder tumor    CYSTOSCOPY  13    TRANSURETHRAL RESECTION BLADDER TUMOR    CYSTOSCOPY  11-    Cystoscopy, bladder biopsy and fulgeration    TUBAL LIGATION         Immunization History:   Immunization History   Administered Date(s) Administered    Influenza, MDCK Quadv, IM, PF (Flucelvax 4 yrs and older) 10/05/2017       Active Problems:  Patient Active Problem List   Diagnosis Code    Brachial-basilar insufficiency syndrome G45.8    Chronic low back pain M54.5, G89.29    Decreased thyroid stimulating hormone (TSH) level R79.89    Hypertriglyceridemia E78.1    Episodic paroxysmal anxiety disorder F41.0    Cerebral artery occlusion with cerebral infarction ([address] I63.50    Anorexia R63.0    Arthritis M19.90    Chronic distal aortic occlusion (HCC) I74.09    Severe sepsis ([address] A41.9, R65.20    UTI (urinary tract infection) N39.0    Seizure disorder ([facility] G40.909    Encephalopathy, metabolic W77.39    Remote history of stroke Z86.73       Isolation/Infection:   Isolation          Droplet            Nurse Assessment:  Last Vital Signs: /83   Pulse 103   Temp 98 °F (36.7 °C) (Axillary)   Resp 16   Ht 5' 2\" (1.575 m) Comment: per CareEverywhere  Wt 117 lb 3.2 oz (53.2 kg)   LMP 07/22/2013   SpO2 98%   Breastfeeding? No   BMI 21.44 kg/m²     Last documented pain score (0-10 scale): Pain Level: 10  Last Weight:   Wt Readings from Last 1 Encounters:   04/09/19 117 lb 3.2 oz (53.2 kg)     Mental Status:  disoriented    IV Access:  - None    Nursing Mobility/ADLs:  Walking   Dependent  Transfer  Dependent  Bathing  Dependent  Dressing  Dependent  Toileting  Dependent  Feeding  Dependent  Med Admin  Dependent  Med Delivery   PEG    Wound Care Documentation and Therapy:  Wound 04/06/19 Sacrum 6 cm x 6 cm (Active)   Wound Pressure Stage  3 4/6/2019 10:00 PM   Dressing Status Clean;Dry; Intact; Changed 4/9/2019  8:23 AM   Dressing Changed Changed/New 4/9/2019  8:23 AM   Dressing/Treatment Moist to dry 4/9/2019  8:23 AM   Wound Cleansed Rinsed/Irrigated with saline 4/8/2019  3:30 PM   Dressing Change Due 04/09/19 4/9/2019  8:23 AM   Wound Length (cm) 6 cm 4/7/2019  8:18 AM   Wound Width (cm) 6 cm 4/7/2019  8:18 AM   Wound Surface Area (cm^2) 36 cm^2 4/7/2019  8:18 AM   Wound Assessment Pink;Red;Black; White 4/9/2019  8:23 AM   Drainage Amount Scant 4/9/2019  8:23 AM   Drainage Description Serosanguinous 4/9/2019  8:23 AM   Odor None 4/9/2019  8:23 AM   Jeaneth-wound Assessment Intact; Red 4/9/2019  8:23 AM   Number of days: 2        Elimination:  Continence:   · Bowel: No  · Bladder: No  Urinary Catheter: {Urinary Catheter:[id number]}   Colostomy/Ileostomy/Ileal Conduit: No       Date of Last BM: 4/16/2019    Intake/Output Summary (Last 24 hours) at 4/9/2019 1007  Last data filed at 4/9/2019 0440  Gross per 24 hour with thin liquids, strict aspiration precautions, only allow pt to eat/drink when sitting fully upright, meds via PEG or crushed in puree. Continue Wound Care for Sacral Ulcer  Follow-up with [facility]  Follow-up with [facility] for Vascultiis           Physician Certification: I certify the above information and transfer of Shirley Lambert  is necessary for the continuing treatment of the diagnosis listed and that she requires [place] for less 30 days.      Update Admission H&P: No change in H&P    PHYSICIAN SIGNATURE:  Electronically signed by Robin Tinsley MD on 4/16/19 at 2:09 PM

## 2019-04-09 NOTE — PROGRESS NOTES
Speech Language Pathology  Attempt Note    SLP attempted dysphagia f/u, however, pt currently receiving care from PCAs. ST to continue to follow and re-attempt f/u at a later time.     Thank you,  Abraham Pardo M.S. [id number] [facility]  Speech-language pathologist  [phone]

## 2019-04-09 NOTE — CONSULTS
[facility] Nurse  Consult Note       NAME:  Sophia Taylor  MEDICAL RECORD NUMBER:  [id number]  AGE: 53 y.o. GENDER: female  : 1965  TODAY'S DATE:  2019    Subjective   Reason for WOCN Evaluation and Assessment: decub ulcer      Sophia Taylor is a 53 y.o. 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 Current Wound Care Treatment:      Patient Goal of Care:  [x] Wound Healing  [] Odor Control  [] Palliative Care  [x] Pain Control   [] Other:         PAST MEDICAL HISTORY        Diagnosis Date    Anorexia     Arthritis     RA    Bulging lumbar disc     C. difficile diarrhea 2017    Cancer ([facility]     bladder CA 2014    Cerebral artery occlusion with cerebral infarction ([facility]     x3     Chronic kidney disease     bladder ca    Hypertriglyceridemia 10/5/2017    Post traumatic stress disorder     Seasonal allergies     Seizure disorder ([facility] 2019    UTI (urinary tract infection) 2019       PAST SURGICAL HISTORY    Past Surgical History:   Procedure Laterality Date    BLADDER SURGERY      [place]    CYSTOSCOPY      transurethral resection bladder tumor    CYSTOSCOPY  13    TRANSURETHRAL RESECTION BLADDER TUMOR    CYSTOSCOPY  11-    Cystoscopy, bladder biopsy and fulgeration    TUBAL LIGATION         FAMILY HISTORY    Family History   Problem Relation Age of Onset    Stroke Mother     Heart Disease Father         pacemaker       SOCIAL HISTORY    Social History     Tobacco Use    Smoking status: Current Every Day Smoker     Packs/day: 0.50     Years: 35.00     Pack years: 17.50     Types: Cigarettes    Smokeless tobacco: Never Used   Substance Use Topics    Alcohol use: No    Drug use:  No ALLERGIES    Allergies   Allergen Reactions    Acetaminophen     Ativan [Lorazepam] Swelling    Codeine Hives    Darvocet [Propoxyphene N-Acetaminophen]     Methadone     Ultram [Tramadol] Swelling    Lodine [Etodolac] Rash       MEDICATIONS    No current facility-administered medications on file prior to encounter. Current Outpatient Medications on File Prior to Encounter   Medication Sig Dispense Refill    morphine 10 MG/5ML solution 10 mg by Per G Tube route every 4 hours as needed for Pain (Severe pain, 7-10).  Multiple Vitamins-Minerals (THERAPEUTIC MULTIVITAMIN-MINERALS) tablet 1 tablet by Per G Tube route daily      predniSONE 5 MG/5ML solution 30 mg by Per G Tube route daily      gabapentin (NEURONTIN) 100 MG capsule by Per G Tube route every 8 hours. 250mg/5ml, give 4mL q8h      baclofen (LIORESAL) 20 MG tablet 20 mg by Per G Tube route Per F MAR, \"give with meals\"      FLUoxetine (PROZAC) 20 MG/5ML solution by Per G Tube route daily       folic acid (FOLVITE) 1 MG tablet 1 mg by Per G Tube route daily       aspirin 81 MG chewable tablet Take 1 tablet by mouth daily (Patient taking differently: 81 mg by Per G Tube route daily ) 30 tablet 3    atorvastatin (LIPITOR) 80 MG tablet Take 1 tablet by mouth nightly (Patient taking differently: 80 mg by Per G Tube route nightly ) 30 tablet 0    ipratropium-albuterol (DUONEB) 0.5-2.5 (3) MG/3ML SOLN nebulizer solution Inhale 1 vial into the lungs every 4 hours      morphine 2 MG/ML injection 5 mg every 6 hours as needed for Pain.  Per G-Tube q6 prn      esomeprazole Magnesium (NEXIUM) 40 MG PACK 40 mg by Per G Tube route daily      docusate sodium (COLACE) 100 MG capsule 100 mg by Per G Tube route 2 times daily      levETIRAcetam (KEPPRA) 1000 MG tablet 1,000 mg 2 times daily      sennosides-docusate sodium (SENOKOT-S) 8.6-50 MG tablet 1 tablet by Per G Tube route 2 times daily      vitamin C (ASCORBIC ACID) 500 MG tablet 500 mg by Per G Tube route 2 times daily      tiZANidine (ZANAFLEX) 2 MG tablet 2 mg by Per G Tube route 3 times daily      acetaminophen (TYLENOL) 325 MG tablet 650 mg by Per G Tube route every 6 hours as needed for Pain      amantadine (SYMMETREL) 50 MG/5ML solution 100 mg by Per G Tube route every 12 hours       apixaban (ELIQUIS) 5 MG TABS tablet 5 mg by Per G Tube route 2 times daily       ergocalciferol (DRISDOL) 8000 UNIT/ML drops 8,000 Units by Per G Tube route every 7 days       lacosamide (VIMPAT) 50 MG TABS tablet 100 mg by Per G Tube route 2 times daily.  melatonin 3 MG TABS tablet 3 mg by Per G Tube route daily       methotrexate Sodium (RHEUMATREX) 50 MG/2ML chemo injection Inject 20 mg into the skin once a week Indications: on Tuesdays Sundays      OLANZapine (ZYPREXA) 2.5 MG tablet Take 2.5 mg by mouth every 12 hours       QUEtiapine (SEROQUEL) 25 MG tablet 25 mg by Per G Tube route nightly       sulfamethoxazole-trimethoprim (BACTRIM;SEPTRA) 200-40 MG/5ML suspension 160 mg by Per G Tube route three times a week          Objective  Contracted arms, hands, legs. BP (!) 186/78   Pulse 103   Temp 98.1 °F (36.7 °C) (Axillary)   Resp 16   Ht 5' 2\" (1.575 m) Comment: per CareEverywhere  Wt 117 lb 3.2 oz (53.2 kg)   LMP 07/22/2013   SpO2 98%   Breastfeeding? No   BMI 21.44 kg/m²     LABS:  WBC:    Lab Results   Component Value Date    WBC 8.8 04/09/2019     H/H:    Lab Results   Component Value Date    HGB 7.2 04/09/2019    HCT 24.2 04/09/2019     PTT:    Lab Results   Component Value Date    APTT 26.2 04/06/2019   [APTT}  PT/INR:    Lab Results   Component Value Date    PROTIME 17.1 04/06/2019    INR 1.50 04/06/2019     HgBA1c:    Lab Results   Component Value Date    LABA1C 5.5 04/06/2019       Assessment  Wound bed red with little slough only @ 10%. See photo. Undermining present circumferentially with deepest 5 cm. Jeaneth wound intact.    Thor Risk Score: Thor Scale Score: 11    Patient Active Problem List   Diagnosis    Brachial-basilar insufficiency syndrome    Chronic low back pain    Decreased thyroid stimulating hormone (TSH) level    Hypertriglyceridemia    Episodic paroxysmal anxiety disorder    Cerebral artery occlusion with cerebral infarction ([facility]    Anorexia    Arthritis    Chronic distal aortic occlusion (HCC)    Severe sepsis (HCC)    UTI (urinary tract infection)    Seizure disorder (HCC)    Encephalopathy, metabolic    Remote history of stroke       Measurements:  Wound 04/06/19 Sacrum 6 cm x 6 cm (Active)   Wound Image   4/9/2019 12:39 PM   Wound Pressure Stage  4 4/9/2019 12:39 PM   Dressing Status Changed 4/9/2019 12:39 PM   Dressing Changed Changed/New 4/9/2019 12:39 PM   Dressing/Treatment Moist to moist 4/9/2019 12:39 PM   Wound Cleansed Rinsed/Irrigated with saline 4/9/2019 12:39 PM   Dressing Change Due 04/10/19 4/9/2019 12:39 PM   Wound Length (cm) 7 cm 4/9/2019 12:39 PM   Wound Width (cm) 6 cm 4/9/2019 12:39 PM   Wound Depth (cm) 5 cm 4/9/2019 12:39 PM   Wound Surface Area (cm^2) 42 cm^2 4/9/2019 12:39 PM   Change in Wound Size % (l*w) -16.67 4/9/2019 12:39 PM   Wound Volume (cm^3) 210 cm^3 4/9/2019 12:39 PM   Undermining Starts ___ O'Clock 12 4/9/2019 12:39 PM   Undermining Ends___ O'Clock 12 4/9/2019 12:39 PM   Undermining Maxium Distance (cm) 5 4/9/2019 12:39 PM   Wound Assessment Red;Yellow 4/9/2019 12:39 PM   Drainage Amount Small 4/9/2019 12:39 PM   Drainage Description Serosanguinous 4/9/2019 12:39 PM   Odor None 4/9/2019 12:39 PM   Margins Unattached edges; Defined edges 4/9/2019 12:39 PM   Exposed structure Bone 4/9/2019 12:39 PM   Jeaneth-wound Assessment Clean;Dry; Intact 4/9/2019 12:39 PM   Non-staged Wound Description Full thickness 4/9/2019 12:39 PM   Red%Wound Bed 90 4/9/2019 12:39 PM   Yellow%Wound Bed 10 4/9/2019 12:39 PM   Culture Taken No 4/9/2019 12:39 PM   Number of days: 2      Sacrum:            Response to treatment:  Poorly tolerated by patient. Pain Assessment:  Severity:  10 / 10  Quality of pain: Moaning and screaming \"it hurts\"  Wound Pain Timing/Severity: intermittent  Premedicated: No    Plan   Plan of Care: Wound 04/06/19 Sacrum 6 cm x 6 cm-Dressing/Treatment: Moist to moist     Call to Dr Dorene Ruiz to discuss care. Recommend:  Clean sacral wound with NSS. Apply zinc barrier around wound bed. Apply moist dressing into wound bed. Cover with 4x4, abd and medipore tape. Change BID. Wound care to follow.    Call wound care for deterioration [phone], Pager [phone].         Specialty Bed Required : Yes   [] Low Air Loss   [] Pressure Redistribution  [] Fluid Immersion  [] Bariatric  [] Total Pressure Relief  [] Other:     Current Diet: DIET GENERAL; Dysphagia II Mechanically Altered  Diet Tube Feed Modular: Protein Modular  Dietary Nutrition Supplements: Frozen Oral Supplement  Diet Tube Feed Continuous/Cyclic w/ Diet  Dietician consult:  Yes    Discharge Plan:  Placement for patient upon discharge: intermediate care facility    Patient appropriate for Outpatient [address] [place] [address] Street: Yes    Referrals:  []   [] [address] [place] [address]  [] Supplies  [] Other    Patient/Caregiver Teaching:  Level of patient/caregiver understanding able to:   [] Indicates understanding       [] Needs reinforcement  [] Unsuccessful      [] Verbal Understanding  [] Demonstrated understanding       [] No evidence of learning  [] Refused teaching         [x] N/A       Electronically signed by Kalie Hernandez RN, MSN, Gabrielle Torres on 4/9/2019 at 12:58 PM

## 2019-04-09 NOTE — PROGRESS NOTES
Neurology Follow up note       [facility]      MD Tri Baum Elders  1965    Date of Service: 4/9/2019    Chart and lab reviewed. The patient continues to improve. She can follow direction today. Denies any new symptoms. The same generalized diffuse weakness. No headache or chest pain. The same chronic neck and back pain. No chest pain or dysphagia rate other review of system was limited. CT head from yesterday showed no acute lesions.      Past Medical History:   Diagnosis Date    Anorexia     Arthritis     RA    Bulging lumbar disc     C. difficile diarrhea 09/30/2017    Cancer ([facility]     bladder CA 2014    Cerebral artery occlusion with cerebral infarction [facility])     x3 2017    Chronic kidney disease     bladder ca    Hypertriglyceridemia 10/5/2017    Post traumatic stress disorder     Seasonal allergies     Seizure disorder ([facility] 4/7/2019    UTI (urinary tract infection) 4/7/2019     Family History   Problem Relation Age of Onset    Stroke Mother     Heart Disease Father         pacemaker     Past Surgical History:   Procedure Laterality Date    BLADDER SURGERY      [place]    CYSTOSCOPY      transurethral resection bladder tumor    CYSTOSCOPY  6-13-13    TRANSURETHRAL RESECTION BLADDER TUMOR    CYSTOSCOPY  11-    Cystoscopy, bladder biopsy and fulgeration    TUBAL LIGATION       Past Surgical History:   Procedure Laterality Date    BLADDER SURGERY      [place]    CYSTOSCOPY      transurethral resection bladder tumor    CYSTOSCOPY  6-13-13    TRANSURETHRAL RESECTION BLADDER TUMOR    CYSTOSCOPY  11-    Cystoscopy, bladder biopsy and fulgeration    TUBAL LIGATION       Family History   Problem Relation Age of Onset    Stroke Mother     Heart Disease Father         pacemaker     Social History     Tobacco Use    Smoking status: Current Every Day Smoker     Packs/day: 0.50     Years: 35.00     Pack years: 17.50     Types: Cigarettes    Smokeless tobacco: Never Used   Substance Use Topics    Alcohol use: No    Drug use: No     Allergies   Allergen Reactions    Acetaminophen     Ativan [Lorazepam] Swelling    Codeine Hives    Darvocet [Propoxyphene N-Acetaminophen]     Methadone     Ultram [Tramadol] Swelling    Lodine [Etodolac] Rash     Current Facility-Administered Medications   Medication Dose Route Frequency Provider Last Rate Last Dose    vancomycin (VANCOCIN) 750 mg in dextrose 5 % 250 mL IVPB  750 mg Intravenous Q12H Betty Rocha MD        OLANZapine (ZYPREXA) tablet 2.5 mg  2.5 mg PEG Tube Q12H Prudence Jimenez MD        magnesium hydroxide (MILK OF MAGNESIA) 400 MG/5ML suspension 30 mL  30 mL Per G Tube Daily PRN Prudence Jimenez MD        potassium bicarb-citric acid (EFFER-K) effervescent tablet 60 mEq  60 mEq Per G Tube BID Prudence Jimenez MD        acyclovir (ZOVIRAX) 490 mg in dextrose 5 % 100 mL IVPB  10 mg/kg Intravenous Q8H Hudson Nunez MD   Stopped at 04/09/19 1042    lidocaine 1 % injection 5 mL  5 mL Intradermal Once Hudson MD Enrique   Stopped at 04/07/19 1705    sodium chloride flush 0.9 % injection 10 mL  10 mL Intravenous 2 times per day Hudson Nunez MD   10 mL at 04/09/19 1045    sodium chloride flush 0.9 % injection 10 mL  10 mL Intravenous PRN Hudson Nunez MD        [facility]) solution 100 mg  100 mg Per G Tube Q12H Betty Rocha MD   100 mg at 04/09/19 1123    apixaban (ELIQUIS) tablet 5 mg  5 mg Per G Tube BID Betty Rocha MD   5 mg at 04/09/19 1043    aspirin chewable tablet 81 mg  81 mg Per G Tube Daily Betty Rocha MD   81 mg at 04/09/19 1045    atorvastatin (LIPITOR) tablet 80 mg  80 mg Per G Tube Nightly Betty Rocha MD   80 mg at 04/08/19 2227    baclofen (LIORESAL) tablet 20 mg  20 mg Per G Tube TID Betty Rocha MD   20 mg at 04/09/19 1043    pantoprazole sodium (PROTONIX) packet 40 mg  40 mg Per G Tube Daily Betty Rocha MD   40 mg at 04/09/19 1123    FLUoxetine (PROZAC) 20 MG/5ML solution 20 mg  20 mg Per G Tube Daily Bello Kc MD   20 mg at 41/92/26 0176    folic acid (FOLVITE) tablet 1 mg  1 mg Per G Tube Daily Bello Kc MD   1 mg at 04/09/19 1044    gabapentin (NEURONTIN) 250 MG/5ML solution 200 mg  200 mg Per G Tube Q8H Bello Kc MD   200 mg at 04/09/19 1129    lacosamide (VIMPAT) tablet 100 mg  100 mg Per G Tube BID Bello Kc MD   100 mg at 04/09/19 1044    levETIRAcetam (KEPPRA) 100 MG/ML solution 1,000 mg  1,000 mg Per G Tube BID Bello Kc MD   1,000 mg at 04/09/19 1122    melatonin tablet 3 mg  3 mg Per G Tube Nightly Bello Kc MD   3 mg at 04/08/19 2227    therapeutic multivitamin-minerals 1 tablet  1 tablet Per G Tube Daily Bello Kc MD   1 tablet at 04/09/19 1044    predniSONE (DELTASONE) tablet 30 mg  30 mg Per G Tube Daily Bello Kc MD   30 mg at 04/09/19 1044    QUEtiapine (SEROQUEL) tablet 25 mg  25 mg Per G Tube Nightly Bello Kc MD   25 mg at 04/08/19 2228    sennosides-docusate sodium (SENOKOT-S) 8.6-50 MG tablet 1 tablet  1 tablet Per G Tube BID Bello Kc MD   1 tablet at 04/09/19 1043    tiZANidine (ZANAFLEX) tablet 2 mg  2 mg Per G Tube TID Bello Kc MD   2 mg at 04/09/19 1044    vitamin C (ASCORBIC ACID) tablet 500 mg  500 mg Per G Tube BID Bello Kc MD   500 mg at 04/09/19 1043    ondansetron (ZOFRAN) injection 4 mg  4 mg Intravenous Q6H PRN Bello Kc MD        HYDROmorphone (DILAUDID) injection 1 mg  1 mg Intravenous Q4H PRN Bello Kc MD   1 mg at 04/09/19 1046    0.9 % sodium chloride infusion   Intravenous Continuous Birdia Spanner, APRN -  mL/hr at 04/09/19 0232      piperacillin-tazobactam (ZOSYN) 3.375 g in dextrose 5 % 50 mL IVPB (mini-bag)  3.375 g Intravenous Q6H Bello Kc MD   Stopped at 04/09/19 0815    LORazepam (ATIVAN) injection 1 mg  1 mg Intravenous Q10 Min PRN Irma Parikh MD        ipratropium-albuterol (DUONEB) nebulizer solution 3 mL  1 vial Inhalation Q4H PRN Irma Parikh MD        glucose (GLUTOSE) 40 % oral gel 15 g  15 g Oral PRN Bossman Leilani, APRN - CNP        dextrose 50 % solution 12.5 g  12.5 g Intravenous PRN Bossman Leilani, APRN - CNP        glucagon (rDNA) injection 1 mg  1 mg Intramuscular PRN Bossman Leilani, APRN - CNP        dextrose 5 % solution  100 mL/hr Intravenous PRN Bossman Leilani, APRN - CNP           ROS: Limited due to MS changes or as per HPI. Constitutional:   Vitals:    04/09/19 0004 04/09/19 0424 04/09/19 0440 04/09/19 0823   BP: 102/68 111/76 107/74 138/83   Pulse: 85 98  103   Resp: 16 16  16   Temp: 98.1 °F (36.7 °C) 98.8 °F (37.1 °C)  98 °F (36.7 °C)   TempSrc: Axillary Axillary  Axillary   SpO2: 99% 99%  98%   Weight:   117 lb 3.2 oz (53.2 kg)    Height:         General appearance:  AAO times three. Eye: PRRR  Neck: supple  Cardiovascular:     No lower leg edema with good pulsation. Mental Status:  AAO times three  Fluent  Poor insight and immediate recall  Intact remote memory  Poor fund of knowledge     Cranial Nerves:   II: Pupils: equal, round, reactive to light  III,IV,VI: Extra Ocular Movements are intact. No nystagmus  V: Facial sensation : NT due to confusion  VII: Facial strength and movements: intact and symmetric  Tongue is midline  Hearing is normal  Normal shoulder shrug  Musculoskeletal:  Diffuse generalized weakness with diffuse atrophy in both arms and legs +2/5. Tone: Normal tone. No rigidity. Reflexes: Diminished in both arms and legs  Planters: flexor bilaterally. Coordination: no tremors  Sensory is normal  Gait cannot be tested due to baseline weakness and encephalopathy.         Data:  LABS:   Lab Results   Component Value Date     04/09/2019    K 2.9 04/09/2019     04/09/2019    CO2 26 04/09/2019    BUN 12 04/09/2019    CREATININE <0.5 04/09/2019 GFRAA >60 04/09/2019    GFRAA >60 05/09/2013    LABGLOM >60 04/09/2019    GLUCOSE 141 04/09/2019    MG 1.80 04/09/2019    CALCIUM 8.2 04/09/2019     Lab Results   Component Value Date    WBC 8.8 04/09/2019    RBC 3.25 04/09/2019    HGB 7.2 04/09/2019    HCT 24.2 04/09/2019    MCV 74.4 04/09/2019    RDW 19.9 04/09/2019     04/09/2019     Lab Results   Component Value Date    INR 1.50 (H) 04/06/2019    PROTIME 17.1 (H) 04/06/2019       Neuroimaging and/or  labs reviewed by me    Impression:   Acute encephalopathy, severe. Likely metabolic encephalopathy from underlying sepsis. Improving   History of medically refractory epilepsy  Chronic encephalopathy  Sepsis  Remote strokes  Vasculopathy with recurrent DVT on anticoagulation and now with right UE DVT on Eliquis  Leukocytosis, improved. Recommendation:  Continue current supportive care  Continue Keppra and Vimpat  Aspiration precautions  Antibiotics  Continue Eliquis  PT and OT  Telemetry  Can DC Acyclovir  Blood sugar monitoring  Continue current blood pressure medication  DC planning when medically stable  No further recommendation  Will follow PRN for now      Luis Bajwa MD  [phone]    This dictation was generated by voice recognition computer software.  Although all attempts are made to edit the dictation for accuracy, there may be errors in the  transcription that are not intended

## 2019-04-09 NOTE — PLAN OF CARE
Nutrition Problem: Increased nutrient needs(protein)  Intervention: Food and/or Nutrient Delivery: Start ONS, Continue current diet, Continue current Tube Feeding  Nutritional Goals: Pt will receive 100% nutrition needs through TF and diet this admission

## 2019-04-09 NOTE — PROGRESS NOTES
[facility]. Patient resting comfortably in bed. No signs of discomfort or distress. Bed is in lowest position, wheels locked, 2/2 side rails up. Bedside table and call light within reach. White board updated. Will continue to monitor patient. Tyson Mendosa RN    7:40 PM  Patient resting in bed comfortably. Patient continues to periodically cry out. Does not verbalize what is bothering her. Fed dinner, tolerated well. Bed in lowest position, brakes locked. Nonskid footwear in place. 5 P's addressed, no needs at this time. Will continue to monitor. Tyson Mendosa RN    9:03 PM  Message sent to cross cover \"Neuro note says okay to DC acyclovir, but its still in [facility]. Okay to DC, or do you still want it to be given? Does this also mean we can take patient out of droplet precautions for possible meningitis? Thanks. \" Tyson Mendosa RN    9:07 PM  Spoke to Ayad NP, okay to dc acyclovir and droplet precautions. Tyson Mendosa RN    Shift assessment complete. (See findings in flowsheet). Med pass complete. (See MAR). VSS. Patient resting in bed, still yelling out occasionally. Bed in lowest position, brakes locked. Nonskid footwear in place. 5 P's addressed, no needs at this time. Tube feed started. 10:18 PM  PRN dilaudid given for pain. Wound on sacrum cleaned and dressing changed per orders. Patient tolerated fairly well. Tyson Mendosa RN    12:34 AM  Patient with worsening rhonci. No SOB, 94% on RA, but sounds terrible. Message sent to [facility] asking if we can get CXR (patient hasnt had one this admission). Tyson Mendosa RN    4428   Orders placed for CXR. Tyson Mendosa RN    4:53 AM  Patient very anxious, yelling out that she is \"terrified of the unknown\". C/o pain all over her body. PRN oxycodone given for pain. Tyson Mendosa RN    4:57 AM  Rec'd call from lab, critical hgb 6.9. Crosscover notified. Tyson Mendosa RN    5:21 AM  Per Jaci Prajapati". No new orders received.

## 2019-04-09 NOTE — CARE COORDINATION
This RN CM contacted [facility] to clarify Pt status prior to arrival.     Per Pt's nurse at [facility], the Pt went to a wound clinic appt. At  last week and a wound vac was ordered. The facility received the wound vac, but it \"didn't fit. \" They were waiting on a new wound vac when the Pt developed the current sxs. She was sent to [facility] and then transferred to [facility] for admission. The plan is for the Pt to return to [facility] once her inpatient admission is completed. Per Claudene Pollard at [facility], the Pt will need a new pre cert. Pre cert will be started when the Pt is closer to discharge. ECOC loaded. Will follow.

## 2019-04-10 ENCOUNTER — APPOINTMENT (OUTPATIENT)
Dept: GENERAL RADIOLOGY | Age: 54
DRG: 720 | End: 2019-04-10
Attending: INTERNAL MEDICINE
Payer: COMMERCIAL

## 2019-04-10 LAB
ABO/RH: NORMAL
ANION GAP SERPL CALCULATED.3IONS-SCNC: 8 MMOL/L (ref 3–16)
ANTIBODY SCREEN: NORMAL
BLOOD BANK DISPENSE STATUS: NORMAL
BLOOD BANK PRODUCT CODE: NORMAL
BPU ID: NORMAL
BUN BLDV-MCNC: 13 MG/DL (ref 7–20)
CALCIUM SERPL-MCNC: 8.4 MG/DL (ref 8.3–10.6)
CHLORIDE BLD-SCNC: 105 MMOL/L (ref 99–110)
CO2: 31 MMOL/L (ref 21–32)
CREAT SERPL-MCNC: <0.5 MG/DL (ref 0.6–1.1)
DESCRIPTION BLOOD BANK: NORMAL
FERRITIN: 49.1 NG/ML (ref 15–150)
GFR AFRICAN AMERICAN: >60
GFR NON-AFRICAN AMERICAN: >60
GLUCOSE BLD-MCNC: 101 MG/DL (ref 70–99)
GLUCOSE BLD-MCNC: 109 MG/DL (ref 70–99)
GLUCOSE BLD-MCNC: 110 MG/DL (ref 70–99)
GLUCOSE BLD-MCNC: 118 MG/DL (ref 70–99)
GLUCOSE BLD-MCNC: 132 MG/DL (ref 70–99)
GLUCOSE BLD-MCNC: 91 MG/DL (ref 70–99)
HCT VFR BLD CALC: 23 % (ref 36–48)
HEMOGLOBIN: 6.9 G/DL (ref 12–16)
HEMOGLOBIN: 9.4 G/DL (ref 12–16)
IRON SATURATION: 4 % (ref 15–50)
IRON: 11 UG/DL (ref 37–145)
MCH RBC QN AUTO: 22 PG (ref 26–34)
MCHC RBC AUTO-ENTMCNC: 30.1 G/DL (ref 31–36)
MCV RBC AUTO: 73.2 FL (ref 80–100)
OCCULT BLOOD SCREENING: NORMAL
PDW BLD-RTO: 20.1 % (ref 12.4–15.4)
PERFORMED ON: ABNORMAL
PERFORMED ON: NORMAL
PLATELET # BLD: 276 K/UL (ref 135–450)
PMV BLD AUTO: 8.1 FL (ref 5–10.5)
POTASSIUM REFLEX MAGNESIUM: 4.3 MMOL/L (ref 3.5–5.1)
RBC # BLD: 3.15 M/UL (ref 4–5.2)
SODIUM BLD-SCNC: 144 MMOL/L (ref 136–145)
TOTAL IRON BINDING CAPACITY: 256 UG/DL (ref 260–445)
WBC # BLD: 7.9 K/UL (ref 4–11)

## 2019-04-10 PROCEDURE — 36415 COLL VENOUS BLD VENIPUNCTURE: CPT

## 2019-04-10 PROCEDURE — 2060000000 HC ICU INTERMEDIATE R&B

## 2019-04-10 PROCEDURE — 85027 COMPLETE CBC AUTOMATED: CPT

## 2019-04-10 PROCEDURE — 2580000003 HC RX 258: Performed by: FAMILY MEDICINE

## 2019-04-10 PROCEDURE — 85018 HEMOGLOBIN: CPT

## 2019-04-10 PROCEDURE — 2580000003 HC RX 258

## 2019-04-10 PROCEDURE — 86923 COMPATIBILITY TEST ELECTRIC: CPT

## 2019-04-10 PROCEDURE — 36430 TRANSFUSION BLD/BLD COMPNT: CPT

## 2019-04-10 PROCEDURE — 82728 ASSAY OF FERRITIN: CPT

## 2019-04-10 PROCEDURE — 86900 BLOOD TYPING SEROLOGIC ABO: CPT

## 2019-04-10 PROCEDURE — 86901 BLOOD TYPING SEROLOGIC RH(D): CPT

## 2019-04-10 PROCEDURE — 83550 IRON BINDING TEST: CPT

## 2019-04-10 PROCEDURE — 6360000002 HC RX W HCPCS: Performed by: INTERNAL MEDICINE

## 2019-04-10 PROCEDURE — G0328 FECAL BLOOD SCRN IMMUNOASSAY: HCPCS

## 2019-04-10 PROCEDURE — 83540 ASSAY OF IRON: CPT

## 2019-04-10 PROCEDURE — 2580000003 HC RX 258: Performed by: INTERNAL MEDICINE

## 2019-04-10 PROCEDURE — 86850 RBC ANTIBODY SCREEN: CPT

## 2019-04-10 PROCEDURE — 6360000002 HC RX W HCPCS: Performed by: FAMILY MEDICINE

## 2019-04-10 PROCEDURE — 80048 BASIC METABOLIC PNL TOTAL CA: CPT

## 2019-04-10 PROCEDURE — 6370000000 HC RX 637 (ALT 250 FOR IP): Performed by: FAMILY MEDICINE

## 2019-04-10 PROCEDURE — 6370000000 HC RX 637 (ALT 250 FOR IP): Performed by: INTERNAL MEDICINE

## 2019-04-10 PROCEDURE — 30233N1 TRANSFUSION OF NONAUTOLOGOUS RED BLOOD CELLS INTO PERIPHERAL VEIN, PERCUTANEOUS APPROACH: ICD-10-PCS | Performed by: INTERNAL MEDICINE

## 2019-04-10 PROCEDURE — P9016 RBC LEUKOCYTES REDUCED: HCPCS

## 2019-04-10 PROCEDURE — 71045 X-RAY EXAM CHEST 1 VIEW: CPT

## 2019-04-10 RX ORDER — SULFAMETHOXAZOLE AND TRIMETHOPRIM 800; 160 MG/1; MG/1
1 TABLET ORAL
Status: DISCONTINUED | OUTPATIENT
Start: 2019-04-10 | End: 2019-04-16 | Stop reason: HOSPADM

## 2019-04-10 RX ORDER — 0.9 % SODIUM CHLORIDE 0.9 %
250 INTRAVENOUS SOLUTION INTRAVENOUS ONCE
Status: DISCONTINUED | OUTPATIENT
Start: 2019-04-10 | End: 2019-04-16 | Stop reason: HOSPADM

## 2019-04-10 RX ORDER — SODIUM CHLORIDE 9 MG/ML
INJECTION, SOLUTION INTRAVENOUS
Status: COMPLETED
Start: 2019-04-10 | End: 2019-04-10

## 2019-04-10 RX ORDER — HYDRALAZINE HYDROCHLORIDE 20 MG/ML
10 INJECTION INTRAMUSCULAR; INTRAVENOUS EVERY 4 HOURS PRN
Status: DISCONTINUED | OUTPATIENT
Start: 2019-04-10 | End: 2019-04-16 | Stop reason: HOSPADM

## 2019-04-10 RX ADMIN — PIPERACILLIN AND TAZOBACTAM 3.38 G: 3; .375 INJECTION, POWDER, FOR SOLUTION INTRAVENOUS at 06:05

## 2019-04-10 RX ADMIN — GABAPENTIN 200 MG: 250 SOLUTION ORAL at 14:49

## 2019-04-10 RX ADMIN — FLUOXETINE HYDROCHLORIDE 20 MG: 20 SOLUTION ORAL at 10:17

## 2019-04-10 RX ADMIN — BACLOFEN 20 MG: 10 TABLET ORAL at 14:44

## 2019-04-10 RX ADMIN — TIZANIDINE 2 MG: 4 TABLET ORAL at 21:15

## 2019-04-10 RX ADMIN — BACLOFEN 20 MG: 10 TABLET ORAL at 09:16

## 2019-04-10 RX ADMIN — LACOSAMIDE 100 MG: 50 TABLET, FILM COATED ORAL at 21:15

## 2019-04-10 RX ADMIN — PIPERACILLIN AND TAZOBACTAM 3.38 G: 3; .375 INJECTION, POWDER, FOR SOLUTION INTRAVENOUS at 00:30

## 2019-04-10 RX ADMIN — ASPIRIN 81 MG 81 MG: 81 TABLET ORAL at 09:17

## 2019-04-10 RX ADMIN — OXYCODONE HYDROCHLORIDE 5 MG: 5 TABLET ORAL at 14:44

## 2019-04-10 RX ADMIN — VANCOMYCIN HYDROCHLORIDE 1000 MG: 1 INJECTION, POWDER, LYOPHILIZED, FOR SOLUTION INTRAVENOUS at 02:36

## 2019-04-10 RX ADMIN — QUETIAPINE FUMARATE 25 MG: 25 TABLET ORAL at 21:15

## 2019-04-10 RX ADMIN — PIPERACILLIN AND TAZOBACTAM 3.38 G: 3; .375 INJECTION, POWDER, FOR SOLUTION INTRAVENOUS at 18:32

## 2019-04-10 RX ADMIN — OXYCODONE HYDROCHLORIDE AND ACETAMINOPHEN 500 MG: 500 TABLET ORAL at 21:15

## 2019-04-10 RX ADMIN — OLANZAPINE 2.5 MG: 5 TABLET, FILM COATED ORAL at 21:15

## 2019-04-10 RX ADMIN — Medication 1000 MG: at 10:17

## 2019-04-10 RX ADMIN — OXYCODONE HYDROCHLORIDE 5 MG: 5 TABLET ORAL at 09:16

## 2019-04-10 RX ADMIN — VANCOMYCIN HYDROCHLORIDE 1000 MG: 1 INJECTION, POWDER, LYOPHILIZED, FOR SOLUTION INTRAVENOUS at 14:34

## 2019-04-10 RX ADMIN — SODIUM CHLORIDE: 900 INJECTION, SOLUTION INTRAVENOUS at 03:52

## 2019-04-10 RX ADMIN — BACLOFEN 20 MG: 10 TABLET ORAL at 21:15

## 2019-04-10 RX ADMIN — LACOSAMIDE 100 MG: 50 TABLET, FILM COATED ORAL at 09:16

## 2019-04-10 RX ADMIN — SULFAMETHOXAZOLE AND TRIMETHOPRIM 1 TABLET: 800; 160 TABLET ORAL at 10:17

## 2019-04-10 RX ADMIN — HYDROMORPHONE HYDROCHLORIDE 1 MG: 2 INJECTION INTRAMUSCULAR; INTRAVENOUS; SUBCUTANEOUS at 11:29

## 2019-04-10 RX ADMIN — TIZANIDINE 2 MG: 4 TABLET ORAL at 09:17

## 2019-04-10 RX ADMIN — ATORVASTATIN CALCIUM 80 MG: 80 TABLET, FILM COATED ORAL at 21:15

## 2019-04-10 RX ADMIN — OXYCODONE HYDROCHLORIDE 5 MG: 5 TABLET ORAL at 04:53

## 2019-04-10 RX ADMIN — Medication 3 MG: at 21:15

## 2019-04-10 RX ADMIN — OXYCODONE HYDROCHLORIDE AND ACETAMINOPHEN 500 MG: 500 TABLET ORAL at 09:17

## 2019-04-10 RX ADMIN — SENNOSIDES AND DOCUSATE SODIUM 1 TABLET: 8.6; 5 TABLET ORAL at 21:15

## 2019-04-10 RX ADMIN — Medication 1000 MG: at 21:15

## 2019-04-10 RX ADMIN — AMANTADINE HYDROCHLORIDE 100 MG: 50 SOLUTION ORAL at 10:17

## 2019-04-10 RX ADMIN — PIPERACILLIN AND TAZOBACTAM 3.38 G: 3; .375 INJECTION, POWDER, FOR SOLUTION INTRAVENOUS at 23:40

## 2019-04-10 RX ADMIN — GABAPENTIN 200 MG: 250 SOLUTION ORAL at 21:15

## 2019-04-10 RX ADMIN — Medication 1 TABLET: at 09:17

## 2019-04-10 RX ADMIN — PREDNISONE 30 MG: 10 TABLET ORAL at 09:16

## 2019-04-10 RX ADMIN — FOLIC ACID 1 MG: 1 TABLET ORAL at 09:17

## 2019-04-10 RX ADMIN — OLANZAPINE 2.5 MG: 5 TABLET, FILM COATED ORAL at 09:16

## 2019-04-10 RX ADMIN — GABAPENTIN 200 MG: 250 SOLUTION ORAL at 09:21

## 2019-04-10 RX ADMIN — Medication 10 ML: at 09:21

## 2019-04-10 RX ADMIN — AMANTADINE HYDROCHLORIDE 100 MG: 50 SOLUTION ORAL at 21:15

## 2019-04-10 RX ADMIN — TIZANIDINE 2 MG: 4 TABLET ORAL at 14:44

## 2019-04-10 RX ADMIN — PANTOPRAZOLE SODIUM 40 MG: 40 GRANULE, DELAYED RELEASE ORAL at 14:47

## 2019-04-10 RX ADMIN — OXYCODONE HYDROCHLORIDE 5 MG: 5 TABLET ORAL at 21:15

## 2019-04-10 RX ADMIN — HYDROMORPHONE HYDROCHLORIDE 1 MG: 2 INJECTION INTRAMUSCULAR; INTRAVENOUS; SUBCUTANEOUS at 23:43

## 2019-04-10 RX ADMIN — PIPERACILLIN AND TAZOBACTAM 3.38 G: 3; .375 INJECTION, POWDER, FOR SOLUTION INTRAVENOUS at 12:41

## 2019-04-10 ASSESSMENT — PAIN SCALES - GENERAL
PAINLEVEL_OUTOF10: 10

## 2019-04-10 ASSESSMENT — PAIN DESCRIPTION - LOCATION: LOCATION: GENERALIZED

## 2019-04-10 ASSESSMENT — PAIN SCALES - WONG BAKER: WONGBAKER_NUMERICALRESPONSE: 6

## 2019-04-10 ASSESSMENT — PAIN DESCRIPTION - PAIN TYPE: TYPE: CHRONIC PAIN

## 2019-04-10 NOTE — PROGRESS NOTES
Bedside report received from [facility]. Patient resting comfortably in bed, watching television. Bed is in lowest position, wheels locked, 2/2 side rails up. Bedside table and call light within reach. White board updated. Will continue to monitor patient. Qamar Zheng RN    9:44 PM  Shift assessment complete. (See findings in flowsheet). Med pass complete. (See MAR). VSS. Patient crying out, asking for pain medicine. PRN leandro given. Tube  Feeds started per orders. Bed in lowest position, brakes locked. Nonskid footwear in place. 5 P's addressed, no needs at this time. 1:49 AM  Wound care completed per orders. Patient tolerated well.     7:42 AM  PRN dilaudid given for pain.  Qamar Zheng RN

## 2019-04-10 NOTE — PROGRESS NOTES
Per G Tube TID    pantoprazole sodium  40 mg Per G Tube Daily    FLUoxetine  20 mg Per G Tube Daily    folic acid  1 mg Per G Tube Daily    gabapentin  200 mg Per G Tube Q8H    lacosamide  100 mg Per G Tube BID    levETIRAcetam  1,000 mg Per G Tube BID    melatonin  3 mg Per G Tube Nightly    therapeutic multivitamin-minerals  1 tablet Per G Tube Daily    predniSONE  30 mg Per G Tube Daily    QUEtiapine  25 mg Per G Tube Nightly    sennosides-docusate sodium  1 tablet Per G Tube BID    tiZANidine  2 mg Per G Tube TID    vitamin C  500 mg Per G Tube BID    piperacillin-tazobactam  3.375 g Intravenous Q6H     PRN Meds: magnesium hydroxide, oxyCODONE, sodium chloride flush, ondansetron, HYDROmorphone, LORazepam, ipratropium-albuterol, glucose, dextrose, glucagon (rDNA), dextrose      Intake/Output Summary (Last 24 hours) at 4/10/2019 0916  Last data filed at 4/10/2019 9794  Gross per 24 hour   Intake 2168 ml   Output 2800 ml   Net -632 ml       Physical Exam Performed:    /88   Pulse 106   Temp 98.1 °F (36.7 °C) (Oral)   Resp 18   Ht 5' 2\" (1.575 m) Comment: per CareEverywhere  Wt 120 lb (54.4 kg)   LMP 07/22/2013   SpO2 96%   Breastfeeding? No   BMI 21.95 kg/m²     General appearance: Awake, NAD. HEENT:  Normal cephalic, atraumatic without obvious deformity. Pupils equal, round, and reactive to light. Extra ocular muscles intact. Conjunctivae/corneas clear. Neck: Supple, with full range of motion. No jugular venous distention. Trachea midline. Respiratory:  Normal respiratory effort. Clear to auscultation, bilaterally without Rales/Wheezes/Rhonchi. Cardiovascular:  Regular rate and rhythm with normal S1/S2 without murmurs, rubs or gallops. Abdomen: Soft, non-tender, non-distended with normal bowel sounds. Musculoskeletal:  No clubbing, cyanosis or edema bilaterally.    Skin: decub ulcer   Neurologic: b/l UE weakness with contractures, no voluntary movement in legs   Blind Eliquis on hold 4/10 given progressive anemia    Right Upper Extremity SVT: Likely 2/2 PIV. Already on [facility]. Continue Eliquis. Local care. Move IV to LUE ideally. Dysphagia  - continue nightly TF via PEG, tolerating well    Sacral Decubitus Ulcer: Stage 4 ulcer, currently under treatment by  Wound Care. Required multiple surgical debridements during hospitalization at [facility] 3/2019. Wound care consulted here. Continue local wound care measures, offloading, and prompt incontinence care. Continue Pain control. DVT Prophylaxis: eliquis  Diet: DIET GENERAL; Dysphagia II Mechanically Altered  Diet Tube Feed Modular: Protein Modular  Dietary Nutrition Supplements: Frozen Oral Supplement  Diet Tube Feed Continuous/Cyclic w/ Diet  Code Status: Full Code    PT/OT Eval Status: not ordered    Dispo - ?2 more days pending further clinical improvement and return to baseline mentation.      Felix Rivas MD

## 2019-04-11 LAB
ANION GAP SERPL CALCULATED.3IONS-SCNC: 13 MMOL/L (ref 3–16)
BLOOD CULTURE, ROUTINE: NORMAL
BLOOD CULTURE, ROUTINE: NORMAL
BUN BLDV-MCNC: 13 MG/DL (ref 7–20)
CALCIUM SERPL-MCNC: 8.7 MG/DL (ref 8.3–10.6)
CHLORIDE BLD-SCNC: 104 MMOL/L (ref 99–110)
CO2: 27 MMOL/L (ref 21–32)
CREAT SERPL-MCNC: 0.6 MG/DL (ref 0.6–1.1)
CULTURE, BLOOD 2: NORMAL
GFR AFRICAN AMERICAN: >60
GFR NON-AFRICAN AMERICAN: >60
GLUCOSE BLD-MCNC: 129 MG/DL (ref 70–99)
GLUCOSE BLD-MCNC: 131 MG/DL (ref 70–99)
GLUCOSE BLD-MCNC: 137 MG/DL (ref 70–99)
GLUCOSE BLD-MCNC: 183 MG/DL (ref 70–99)
HCT VFR BLD CALC: 33.1 % (ref 36–48)
HEMOGLOBIN: 10.4 G/DL (ref 12–16)
MCH RBC QN AUTO: 23.6 PG (ref 26–34)
MCHC RBC AUTO-ENTMCNC: 31.6 G/DL (ref 31–36)
MCV RBC AUTO: 74.9 FL (ref 80–100)
PDW BLD-RTO: 20 % (ref 12.4–15.4)
PERFORMED ON: ABNORMAL
PLATELET # BLD: 309 K/UL (ref 135–450)
PMV BLD AUTO: 8.5 FL (ref 5–10.5)
POTASSIUM REFLEX MAGNESIUM: 3.8 MMOL/L (ref 3.5–5.1)
RBC # BLD: 4.42 M/UL (ref 4–5.2)
SODIUM BLD-SCNC: 144 MMOL/L (ref 136–145)
VANCOMYCIN TROUGH: 19.8 UG/ML (ref 10–20)
WBC # BLD: 10.2 K/UL (ref 4–11)

## 2019-04-11 PROCEDURE — 2580000003 HC RX 258: Performed by: INTERNAL MEDICINE

## 2019-04-11 PROCEDURE — 97166 OT EVAL MOD COMPLEX 45 MIN: CPT

## 2019-04-11 PROCEDURE — 6370000000 HC RX 637 (ALT 250 FOR IP): Performed by: FAMILY MEDICINE

## 2019-04-11 PROCEDURE — 2580000003 HC RX 258: Performed by: FAMILY MEDICINE

## 2019-04-11 PROCEDURE — 97110 THERAPEUTIC EXERCISES: CPT

## 2019-04-11 PROCEDURE — 94761 N-INVAS EAR/PLS OXIMETRY MLT: CPT

## 2019-04-11 PROCEDURE — 2700000000 HC OXYGEN THERAPY PER DAY

## 2019-04-11 PROCEDURE — 92526 ORAL FUNCTION THERAPY: CPT

## 2019-04-11 PROCEDURE — 80202 ASSAY OF VANCOMYCIN: CPT

## 2019-04-11 PROCEDURE — 85027 COMPLETE CBC AUTOMATED: CPT

## 2019-04-11 PROCEDURE — 6360000002 HC RX W HCPCS: Performed by: INTERNAL MEDICINE

## 2019-04-11 PROCEDURE — 6360000002 HC RX W HCPCS: Performed by: FAMILY MEDICINE

## 2019-04-11 PROCEDURE — 6370000000 HC RX 637 (ALT 250 FOR IP): Performed by: INTERNAL MEDICINE

## 2019-04-11 PROCEDURE — 36415 COLL VENOUS BLD VENIPUNCTURE: CPT

## 2019-04-11 PROCEDURE — 80048 BASIC METABOLIC PNL TOTAL CA: CPT

## 2019-04-11 PROCEDURE — 2060000000 HC ICU INTERMEDIATE R&B

## 2019-04-11 RX ADMIN — BACLOFEN 20 MG: 10 TABLET ORAL at 20:43

## 2019-04-11 RX ADMIN — Medication 1000 MG: at 09:37

## 2019-04-11 RX ADMIN — Medication 1000 MG: at 20:43

## 2019-04-11 RX ADMIN — Medication 10 ML: at 20:44

## 2019-04-11 RX ADMIN — OXYCODONE HYDROCHLORIDE AND ACETAMINOPHEN 500 MG: 500 TABLET ORAL at 20:43

## 2019-04-11 RX ADMIN — FOLIC ACID 1 MG: 1 TABLET ORAL at 09:24

## 2019-04-11 RX ADMIN — TIZANIDINE 2 MG: 4 TABLET ORAL at 09:24

## 2019-04-11 RX ADMIN — VANCOMYCIN HYDROCHLORIDE 1000 MG: 1 INJECTION, POWDER, LYOPHILIZED, FOR SOLUTION INTRAVENOUS at 14:46

## 2019-04-11 RX ADMIN — ASPIRIN 81 MG 81 MG: 81 TABLET ORAL at 09:24

## 2019-04-11 RX ADMIN — GABAPENTIN 200 MG: 250 SOLUTION ORAL at 14:46

## 2019-04-11 RX ADMIN — PREDNISONE 30 MG: 10 TABLET ORAL at 09:24

## 2019-04-11 RX ADMIN — AMANTADINE HYDROCHLORIDE 100 MG: 50 SOLUTION ORAL at 20:43

## 2019-04-11 RX ADMIN — OXYCODONE HYDROCHLORIDE AND ACETAMINOPHEN 500 MG: 500 TABLET ORAL at 09:24

## 2019-04-11 RX ADMIN — TIZANIDINE 2 MG: 4 TABLET ORAL at 20:44

## 2019-04-11 RX ADMIN — Medication 1 TABLET: at 09:24

## 2019-04-11 RX ADMIN — Medication 10 ML: at 09:25

## 2019-04-11 RX ADMIN — BACLOFEN 20 MG: 10 TABLET ORAL at 09:24

## 2019-04-11 RX ADMIN — PIPERACILLIN AND TAZOBACTAM 3.38 G: 3; .375 INJECTION, POWDER, FOR SOLUTION INTRAVENOUS at 17:56

## 2019-04-11 RX ADMIN — BACLOFEN 20 MG: 10 TABLET ORAL at 14:47

## 2019-04-11 RX ADMIN — LACOSAMIDE 100 MG: 50 TABLET, FILM COATED ORAL at 20:43

## 2019-04-11 RX ADMIN — QUETIAPINE FUMARATE 25 MG: 25 TABLET ORAL at 20:44

## 2019-04-11 RX ADMIN — TIZANIDINE 2 MG: 4 TABLET ORAL at 14:46

## 2019-04-11 RX ADMIN — OLANZAPINE 2.5 MG: 5 TABLET, FILM COATED ORAL at 09:24

## 2019-04-11 RX ADMIN — ATORVASTATIN CALCIUM 80 MG: 80 TABLET, FILM COATED ORAL at 20:44

## 2019-04-11 RX ADMIN — PANTOPRAZOLE SODIUM 40 MG: 40 GRANULE, DELAYED RELEASE ORAL at 09:37

## 2019-04-11 RX ADMIN — PIPERACILLIN AND TAZOBACTAM 3.38 G: 3; .375 INJECTION, POWDER, FOR SOLUTION INTRAVENOUS at 05:53

## 2019-04-11 RX ADMIN — VANCOMYCIN HYDROCHLORIDE 1000 MG: 1 INJECTION, POWDER, LYOPHILIZED, FOR SOLUTION INTRAVENOUS at 03:17

## 2019-04-11 RX ADMIN — GABAPENTIN 200 MG: 250 SOLUTION ORAL at 20:43

## 2019-04-11 RX ADMIN — HYDROMORPHONE HYDROCHLORIDE 1 MG: 2 INJECTION INTRAMUSCULAR; INTRAVENOUS; SUBCUTANEOUS at 07:41

## 2019-04-11 RX ADMIN — AMANTADINE HYDROCHLORIDE 100 MG: 50 SOLUTION ORAL at 09:37

## 2019-04-11 RX ADMIN — HYDROMORPHONE HYDROCHLORIDE 1 MG: 2 INJECTION INTRAMUSCULAR; INTRAVENOUS; SUBCUTANEOUS at 18:59

## 2019-04-11 RX ADMIN — SENNOSIDES AND DOCUSATE SODIUM 1 TABLET: 8.6; 5 TABLET ORAL at 20:43

## 2019-04-11 RX ADMIN — Medication 3 MG: at 20:47

## 2019-04-11 RX ADMIN — FLUOXETINE HYDROCHLORIDE 20 MG: 20 SOLUTION ORAL at 09:37

## 2019-04-11 RX ADMIN — SENNOSIDES AND DOCUSATE SODIUM 1 TABLET: 8.6; 5 TABLET ORAL at 09:24

## 2019-04-11 RX ADMIN — OLANZAPINE 2.5 MG: 5 TABLET, FILM COATED ORAL at 20:43

## 2019-04-11 RX ADMIN — PIPERACILLIN AND TAZOBACTAM 3.38 G: 3; .375 INJECTION, POWDER, FOR SOLUTION INTRAVENOUS at 11:24

## 2019-04-11 RX ADMIN — GABAPENTIN 200 MG: 250 SOLUTION ORAL at 09:37

## 2019-04-11 RX ADMIN — LACOSAMIDE 100 MG: 50 TABLET, FILM COATED ORAL at 09:24

## 2019-04-11 ASSESSMENT — PAIN SCALES - GENERAL
PAINLEVEL_OUTOF10: 0
PAINLEVEL_OUTOF10: 10
PAINLEVEL_OUTOF10: 10
PAINLEVEL_OUTOF10: 0

## 2019-04-11 NOTE — PROGRESS NOTES
Hospitalist Progress Note      PCP: Adrianne Rodgers    Date of Admission: 4/6/2019    Chief Complaint: lethargy    Hospital Course: 53 y. o. female with PMHx of CVA w/ R hemiparesis, seizure disorder, SVT, HTN, Chronic Hematuria with prior Bladder Cancer, chronic randle, and Vasculopathy; working diagnosis of Giant Cell Arthritis vs. Atypical Takayasu. She has had multiple prolonged hospitalizations at  over the past few months. She was previously found to have numerous thrombosed vessels in her brain as well as in her subclavian and infrarenal AAA. 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 Followed by [facility] and currently on chronic Prednisone and Eliquis. She was most recently discharged from [facility] on 3/23/19 after treatment for Sepsis, to [facility]. She presented to the ED on 4/6/19 with concerns for increased lethargy and diaphoresis. During ED evaluation, she was found to be tachycardic, which was controlled with cardizem gtt. Also during ED evaluation, she had witnessed seizure activity, which prompted transfer to [facility]. Subjective:   More alert. Talkative. Pain better controlled.      Medications:  Reviewed    Infusion Medications    dextrose       Scheduled Medications    sodium chloride  250 mL Intravenous Once    sulfamethoxazole-trimethoprim  1 tablet PEG Tube Once per day on Mon Wed Fri    OLANZapine  2.5 mg PEG Tube Q12H    vancomycin  1,000 mg Intravenous Q12H    lidocaine 1 % injection  5 mL Intradermal Once    sodium chloride flush  10 mL Intravenous 2 times per day    amantadine  100 mg Per G Tube Q12H    aspirin  81 mg Per G Tube Daily    atorvastatin  80 mg Per G Tube Nightly    baclofen  20 mg Per G Tube TID    pantoprazole sodium  40 mg Per G Tube Daily    FLUoxetine  20 mg Per G Tube Daily    folic acid  1 mg Per G Tube Daily    gabapentin  200 mg Per G Tube Q8H    lacosamide  100 mg Per G Tube BID    levETIRAcetam  1,000 mg Per G Tube BID    melatonin  3 mg Per G Tube Nightly    therapeutic multivitamin-minerals  1 tablet Per G Tube Daily    predniSONE  30 mg Per G Tube Daily    QUEtiapine  25 mg Per G Tube Nightly    sennosides-docusate sodium  1 tablet Per G Tube BID    tiZANidine  2 mg Per G Tube TID    vitamin C  500 mg Per G Tube BID    piperacillin-tazobactam  3.375 g Intravenous Q6H     PRN Meds: hydrALAZINE, magnesium hydroxide, oxyCODONE, sodium chloride flush, ondansetron, HYDROmorphone, LORazepam, ipratropium-albuterol, glucose, dextrose, glucagon (rDNA), dextrose      Intake/Output Summary (Last 24 hours) at 4/11/2019 1239  Last data filed at 4/11/2019 0915  Gross per 24 hour   Intake 2223 ml   Output 1800 ml   Net 423 ml       Physical Exam Performed:    /81   Pulse 98   Temp 98.7 °F (37.1 °C) (Oral)   Resp 16   Ht 5' 2\" (1.575 m) Comment: per CareEverywhere  Wt 117 lb 6.4 oz (53.3 kg)   LMP 07/22/2013   SpO2 97%   Breastfeeding? No   BMI 21.47 kg/m²     General appearance: Awake, NAD. HEENT:  Normal cephalic, atraumatic without obvious deformity. Pupils equal, round, and reactive to light. Extra ocular muscles intact. Conjunctivae/corneas clear. Neck: Supple, with full range of motion. No jugular venous distention. Trachea midline. Respiratory:  Normal respiratory effort. Clear to auscultation, bilaterally without Rales/Wheezes/Rhonchi. Cardiovascular:  Regular rate and rhythm with normal S1/S2 without murmurs, rubs or gallops. Abdomen: Soft, non-tender, non-distended with normal bowel sounds. Musculoskeletal:  No clubbing, cyanosis or edema bilaterally. Skin: decub ulcer   Neurologic: b/l UE weakness with contractures, no voluntary movement in legs   Blind    Psychiatric:  More alert and more oriented, answers questions appropriately. Nearing baseline. Capillary Refill: Brisk,< 3 seconds   Peripheral Pulses: +2 palpable, equal bilaterally           Labs:   Recent Labs     04/09/19  0439 04/10/19  0441 04/10/19  1529 04/11/19  0204   WBC 8.8 7.9  --  10.2   HGB 7.2* 6.9* 9.4* 10.4*   HCT 24.2* 23.0*  --  33.1*    276  --  309     Recent Labs     04/09/19  0439 04/10/19  0441 04/11/19  0204    144 144   K 2.9* 4.3 3.8    105 104   CO2 26 31 27   BUN 12 13 13   CREATININE <0.5* <0.5* 0.6   CALCIUM 8.2* 8.4 8.7     No results for input(s): AST, ALT, BILIDIR, BILITOT, ALKPHOS in the last 72 hours. No results for input(s): INR in the last 72 hours. No results for input(s): Donnaphong Nevarezly in the last 72 hours. Urinalysis:      Lab Results   Component Value Date    NITRU Negative 04/06/2019    WBCUA >100 04/06/2019    BACTERIA 4+ 04/06/2019    RBCUA see below 04/06/2019    BLOODU LARGE 04/06/2019    SPECGRAV 1.025 04/06/2019    GLUCOSEU Negative 04/06/2019       Radiology:  XR CHEST PORTABLE   Final Result   No acute disease. CT HEAD WO CONTRAST   Final Result   No acute intracranial abnormality. IR ULTRASOUND GUIDANCE VASCULAR ACCESS   Final Result   Unsuccessful attempt at placement of left upper extremity PICC line due to   lack of visualization of vessel of sufficient size to facilitate placement of   PICC line as described.          VL Extremity Venous Right   Final Result            Assessment/Plan:    Active Hospital Problems    Diagnosis Date Noted    Chronic distal aortic occlusion ([facility] [I74.09] 04/07/2019    Severe sepsis ([facility] [A41.9, R65.20] 04/07/2019    UTI (urinary tract infection) [N39.0] 04/07/2019    Seizure disorder ([facility] [G40.909] 04/07/2019    Encephalopathy, metabolic [J10.18]     Remote history of stroke [Z86.73]      Severe sepsis, present on arrival. Possible UTI vs pneumonia, less likely meningitis  - marked tachycardia, leukocytosis on presentation  - lactate 3.3 initially, - Continue steroids, ASA, statin. - Eliquis on hold 4/10 given progressive anemia; resume soon if Hb stable. Right Upper Extremity SVT: Likely 2/2 PIV. [facility]. Continue Eliquis. Local care. Dysphagia  - continue nightly TF via PEG, tolerating well    Sacral Decubitus Ulcer: Stage 4 ulcer, currently under treatment by  Wound Care. Required multiple surgical debridements during hospitalization at [facility] 3/2019. Wound care consulted here. Continue local wound care measures, offloading, and prompt incontinence care. Continue Pain control. DVT Prophylaxis: eliquis  Diet: DIET GENERAL; Dysphagia II Mechanically Altered  Diet Tube Feed Modular: Protein Modular  Dietary Nutrition Supplements: Frozen Oral Supplement  Diet Tube Feed Continuous/Cyclic w/ Diet  Code Status: Full Code    PT/OT Eval Status: not ordered    Dispo - ?return to SNF/ECF soon if Hb stable and Abx completed.      Kenzie Johnson MD

## 2019-04-11 NOTE — PROGRESS NOTES
Speech Language Pathology  Facility/Department: [facility]  Dysphagia Daily Treatment Note    NAME: Juan Miguel Benavidez  : 1965  MRN: [id number]    Patient Diagnosis(es):   Patient Active Problem List    Diagnosis Date Noted    Chronic distal aortic occlusion ([facility] 2019    Severe sepsis ([facility] 2019    UTI (urinary tract infection) 2019    Seizure disorder ([facility] 2019    Encephalopathy, metabolic     Remote history of stroke     Decreased thyroid stimulating hormone (TSH) level 10/05/2017    Hypertriglyceridemia 10/05/2017    Episodic paroxysmal anxiety disorder 10/05/2017    Cerebral artery occlusion with cerebral infarction ([facility] 10/05/2017    Anorexia 10/05/2017    Arthritis 10/05/2017    Brachial-basilar insufficiency syndrome 2017    Chronic low back pain 2016     Allergies: Allergies   Allergen Reactions    Acetaminophen     Ativan [Lorazepam] Swelling    Codeine Hives    Darvocet [Propoxyphene N-Acetaminophen]     Methadone     Ultram [Tramadol] Swelling    Lodine [Etodolac] Rash     Onset Date: admit date 19  Current Diet Level:  Mechanical soft food textures (dysphagia diet II) with thin liquids; strict aspiration precautions    Pain:  Pt asleep upon SLP entering room; c/o 8/10 shoulder pain (bilaterally) when woken; pain diminished with pillows placed under shoulders    Diet Tolerance:  Patient tolerating current diet level with no signs/symptoms of penetration/aspiration. P.O. Trials: Thin   x Spoon x 6, cup sip x 6   Puree   x X 2   Semi-Solid   x X 6     Impressions:  RN okays SLP entry into pt's room. Pt asleep upon SLP entering, but awakes easily with verbal stim. Pt c/o 8/10 shoulder pain bilaterally and yells out in pain when HOB elevated. Pt allows only partial HOB elevation due to pain issues, but reports some alleviation with pillows placed under sides/shoulders.   RR 16/min prior to po trials with O2 sat of 97% on RA.  Pt has consistent wet cough that is productive of clear sputum only when pt is cued to \"cough hard. \"  Per CBC taken today, WBC is 10.4. CXR from 04/10/19 revealed: \"No acute disease. \"  Pt has intermittent wet cough post swallow with thin liquids, which pt denies is related to swallowing. SLP encourages pt to allow [facility] to be elevated further, but she refuses due to pain. Pt likely could not tolerate MBS related to pain/positioning issues. If concern for aspiration arises, would consider FEES completion. This was not discussed with pt today. RR remained stable at 14-16 throughout follow-up with no O2 desaturation. Patient/Family/Caregiver Education:  SLP reviewed aspiration precautions with pt    Compensatory Swallowing Strategies  Compensatory Swallowing Strategies:   · Alternate solids and liquids;  · Small bites/sips;  · Upright as possible for all oral intake;  · Total feed;  · Remain upright for 30-45 minutes after meals;  · Eat/Feed slowly     Treatment/Goals  Short-term Goals  Timeframe for Short-term Goals: 2-3 sessions  1. The patient will tolerate recommended diet without observed clinical signs of aspiration; Today, 04/11: Progressing. Ongoing. 2. The patient/caregiver will demonstrate understanding of compensatory strategies for improved swallowing safety.; Today, 04/11: Progressing. Ongoing. 3. The patient will tolerate regular consistency solids 10/10. Today, 04/11: Not targeted. Long-term Goals  Timeframe for Long-term Goals: 1 week  Goal 1: The pt will tolerate safest and least restrictive diet without s/s of aspiration. Today, 04/11: Progressing. Ongoing. Plan:  Continued daily Dysphagia treatment with goals per plan of care. If patient is discharged prior to next treatment session, this note serves as treatment and discharge summary. Time: 3617-3505, Dysphagia Tx    Susanna SUKUMAR [place], [id number]  Speech-Language Pathologist  (desk #): [phone]

## 2019-04-11 NOTE — PROGRESS NOTES
complications (free text)  Activity Tolerance: Pt declined OOB tasks at this time. Tolerated little UE AROM due to report of increased pain with further attempts  Safety Devices  Safety Devices in place: Yes  Type of devices: Left in bed;Bed alarm in place;Call light within reach           Patient Diagnosis(es): There were no encounter diagnoses. has a past medical history of Anorexia, Arthritis, Bulging lumbar disc, C. difficile diarrhea, Cancer ([facility] Cerebral artery occlusion with cerebral infarction ([facility] Chronic kidney disease, Hypertriglyceridemia, Post traumatic stress disorder, Seasonal allergies, Seizure disorder ([facility] and UTI (urinary tract infection). has a past surgical history that includes Tubal ligation; Cystoscopy; Cystoscopy (6-13-13); Bladder surgery; and Cystoscopy (11-). Treatment Diagnosis: decreased ADLs and ROM      Restrictions  Position Activity Restriction  Other position/activity restrictions: up with assistance; SZD    Subjective   General  Chart Reviewed: Yes  Family / Caregiver Present: No  Referring Practitioner: Dorene Ruiz MD  Diagnosis: SVT  Subjective  Subjective: Pt semi supine in bed upon arrival, lethargic however agreeable to OT eval and treat. Pt in decordicate positioning, agreeable to PROM  General Comment  Comments: RN cleared pt to participate in therapy. Reporting pt had not tolerated jordin care with PCAs earlier     Social/Functional History  Social/Functional History  Additional Comments: Pt is a questionable historian, providing conflicting information. 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 Will continue to assess PLOF should cognition improve and as family is available. Per CM note, pt was in SNF care immediately prior to admission.        Objective   Vision: Impaired  Vision Exceptions: (max difficulty tracking finger, noted R focal gaze, unable to provide correct number of fingers therapist holding up)  Hearing: Within functional limits    Orientation  Overall Orientation Status: Impaired  Orientation Level: Oriented to person;Disoriented to place; Disoriented to time;Disoriented to situation(year as 2002, location as [facility], unaware of situation)        ADL  Feeding: Dependent/Total  Grooming: Dependent/Total(to wipe face)  Tone RUE  RUE Tone: Hypertonic  Tone Description: decordicate  Tone LUE  LUE Tone: Hypertonic  Tone Description: decordicate  Quality of Movement Other  Comment: no active movement noted. Pt was repositioned at end of session after PROM to reduce ER. Elbows placed in 90 deg flexion, shoulders in neutral position. PT tolerated this position well, noted resting comfortably at end of session. RN in room assisting feeding. Transfers  Transfer Comments: pt declined OOB tasks at this time     Cognition  Overall Cognitive Status: Exceptions  Arousal/Alertness: Delayed responses to stimuli;Inconsistent responses to stimuli  Following Commands: Follows one step commands with increased time; Follows one step commands with repetition  Attention Span: Attends with cues to redirect  Memory: Decreased recall of recent events;Decreased short term memory  Insights: Decreased awareness of deficits  Initiation: Requires cues for all  Sequencing: Requires cues for all                 LUE PROM (degrees)  LUE General PROM: elbow flexion contracture noted; elbow extension to approximately 90 deg, pt reported increased pain with further attempt at PROM  Left Hand PROM (degrees)  Left Hand General PROM: some increased tone noted with extension, able to tolerate approximately 90% extension  RUE PROM (degrees)  RUE General PROM: elbow flexion contracture noted, PROM to approximately 75 deg to extension before pt reported increased pain and declined further attempt  Right Hand PROM (degrees)  Right Hand General PROM: limited wrist flexion noted due to

## 2019-04-11 NOTE — PLAN OF CARE
Nutrition Problem: Increased nutrient needs(protein)  Intervention: Food and/or Nutrient Delivery: Continue current Tube Feeding, Continue current diet, Continue current ONS  Nutritional Goals: Patient will continue to eat 50% or greater of meals and supplements + tolerate tube feeding without signs of aspiration.

## 2019-04-11 NOTE — FLOWSHEET NOTE
04/11/19 0940   Vital Signs   Temp 98.5 °F (36.9 °C)   Temp Source Oral   Pulse 109   Heart Rate Source Monitor   Resp 16   /83   BP Location Right lower arm   MAP (mmHg) 97   Patient Position Semi fowlers   Level of Consciousness 0   MEWS Score 2   Patient Currently in Pain Denies   Oxygen Therapy   SpO2 96 %   O2 Device None (Room air)   Patient resting in bed, no s/s of distress. Denies chest pain and SOB.  WCM Per Dr. Catalan, Lexapro and Xarelto sent to pharmacy.  Patient instructed and pharmacy will also instruct.  Patient instructions at home are as follows:    Bedrest/Couchrest   -Get up to go to restroom   -Get up to eat    -Keep right leg elevated as much as possible   -Can use heating pad on low setting for relief if    Needed  No Driving    Follow these instructions until f/u with Dr. Catalan on Thursday 3/15/18.  Dr. Catalan will evaluate patient at that time and add on additional activities.  Patient stated understanding.

## 2019-04-11 NOTE — PROGRESS NOTES
Nutrition Assessment (Enteral Nutrition)    Type and Reason for Visit: Reassess    Nutrition Recommendations:   1. Dysphagia 2 mechanically altered diet  2. Jevity 1.5 at 65 ml/hr for 12 hours with water flush 100 ml every 4 hours  3. Magic cup with meals  4. Will monitor nutritional adequacy, nutrition-related labs, weights, BMs, and clinical progress     Nutrition Assessment: Follow up:  Nutritional status at risk for decline with increased nutrient needs and isk for high risk for aspiration. Jevity 1.5 still infusing at 65 ml/hr with water flush 100 ml every 4 hours. Normal saline at 100 ml/hr. KCL ordered this am due to K 2.9. Speech recommended Dysphagia 2 mechanically altered on 4/8 mild to moderate oral and pharyngeal dysphagia. Will add magic cup with meals. Malnutrition Assessment:  · Malnutrition Status: At risk for malnutrition  · Context: Acute illness or injury  · Findings of the 6 clinical characteristics of malnutrition (Minimum of 2 out of 6 clinical characteristics is required to make the diagnosis of moderate or severe Protein Calorie Malnutrition based on AND/ASPEN Guidelines):  1. Energy Intake-(TF meeting 75% EER), Greater than or equal to 7 days    2. Weight Loss-No significant weight loss,    3. Fat Loss-(unsure baseline),    4. Muscle Loss-Unable to assess,    5. Fluid Accumulation-Unable to assess,    6.  Strength-Not measured    Nutrition Risk Level:  Moderate    Nutrition Needs:  · Estimated Daily Total Kcal: 1785-0160  · Estimated Daily Protein (g): 63-78  · Estimated Daily Fluid (ml/day): 1ml/kcal or per MD    Nutrition Diagnosis:   · Problem: Increased nutrient needs(protein)  · Etiology: related to Increased demand for energy/nutrients     Signs and symptoms:  as evidenced by Presence of wounds    Objective Information:  · Nutrition-Focused Physical Findings: Non verbal. +4 RUE edema  · Wound Type: Stage III, Pressure Ulcer(sacrum)  · Current Nutrition Therapies:  · Oral Diet Orders: Dysphagia 2   · Oral Diet intake: 51-75%  · Oral Nutrition Supplement (ONS) Orders: Frozen Oral Supplement  · ONS intake: 51-75%  · Tube Feeding (TF) Orders:   · Feeding Route: Gastrostomy  · Formula: 1.5 Calorie with Fiber  · Rate (ml/hr):65 ml/hr    · Volume (ml/day):    · Duration: Cyclic  · Water Flushes: 100 ml every 4 hours  · Current TF & Flush Orders Provides: switched back to cyclic due to patient eating 50% or greater of meals   · Goal TF & Flush Orders Provides: Jevity 1.5 at 65 ml/hr 7P to 7A providing 1170 calories, 50 grams of protein, 592 ml free water + water flushes 100 ml every 4 hours  · Additional Calories:    · Anthropometric Measures:  · Ht: 5' 2\" (157.5 cm)(per CareEverywhere)   · Current Body Wt: 117 lb 3 oz (53.2 kg)  · Ideal Body Wt: 110 lb (49.9 kg), % Ideal Body    · BMI Classification: BMI 18.5 - 24.9 Normal Weight(20.7 kg/m^2)    Nutrition Interventions:   Continue current Tube Feeding, Continue current diet, Continue current ONS  Continued Inpatient Monitoring    Nutrition Evaluation:   · Evaluation: Goals set   · Goals: Patient will continue to eat 50% or greater of meals and supplements + tolerate tube feeding without signs of aspiration.      · Monitoring: Meal Intake, TF Intake, Diet Tolerance, TF Tolerance, Pertinent Labs, Weight      Electronically signed by Aure Silva RD, LD on 4/11/19 at 11:58 AM    Contact Number: Office: [phone]; [address]: [address]

## 2019-04-11 NOTE — CARE COORDINATION
Per MD note, Pt will need another 2 days pending clinical improvement and return to baseline mentation. Perfect serve sent to MD for PT/OT orders when appropriate, as the Pt will need new pre cert to return to [facility] skilled.

## 2019-04-11 NOTE — PLAN OF CARE
Problem: Pain:  Goal: Pain level will decrease  Description  Pain level will decrease  Outcome: Ongoing  Goal: Control of acute pain  Description  Control of acute pain  Outcome: Ongoing  Goal: Control of chronic pain  Description  Control of chronic pain  Outcome: Ongoing     Problem: Confusion - Acute:  Goal: Absence of continued neurological deterioration signs and symptoms  Description  Absence of continued neurological deterioration signs and symptoms  Outcome: Ongoing  Goal: Mental status will be restored to baseline  Description  Mental status will be restored to baseline  Outcome: Ongoing     Problem: Nutrition  Goal: Optimal nutrition therapy  4/11/2019 1200 by Reno Kraft RD, LEXI  Outcome: Ongoing

## 2019-04-12 LAB
ANION GAP SERPL CALCULATED.3IONS-SCNC: 10 MMOL/L (ref 3–16)
BUN BLDV-MCNC: 13 MG/DL (ref 7–20)
CALCIUM SERPL-MCNC: 8.4 MG/DL (ref 8.3–10.6)
CHLORIDE BLD-SCNC: 104 MMOL/L (ref 99–110)
CO2: 28 MMOL/L (ref 21–32)
CREAT SERPL-MCNC: <0.5 MG/DL (ref 0.6–1.1)
GFR AFRICAN AMERICAN: >60
GFR NON-AFRICAN AMERICAN: >60
GLUCOSE BLD-MCNC: 120 MG/DL (ref 70–99)
GLUCOSE BLD-MCNC: 122 MG/DL (ref 70–99)
GLUCOSE BLD-MCNC: 124 MG/DL (ref 70–99)
GLUCOSE BLD-MCNC: 125 MG/DL (ref 70–99)
GLUCOSE BLD-MCNC: 136 MG/DL (ref 70–99)
HCT VFR BLD CALC: 32 % (ref 36–48)
HEMOGLOBIN: 9.9 G/DL (ref 12–16)
MCH RBC QN AUTO: 23.4 PG (ref 26–34)
MCHC RBC AUTO-ENTMCNC: 30.9 G/DL (ref 31–36)
MCV RBC AUTO: 75.8 FL (ref 80–100)
PDW BLD-RTO: 21.4 % (ref 12.4–15.4)
PERFORMED ON: ABNORMAL
PLATELET # BLD: 287 K/UL (ref 135–450)
PMV BLD AUTO: 8.6 FL (ref 5–10.5)
POTASSIUM REFLEX MAGNESIUM: 3.6 MMOL/L (ref 3.5–5.1)
RBC # BLD: 4.22 M/UL (ref 4–5.2)
SODIUM BLD-SCNC: 142 MMOL/L (ref 136–145)
WBC # BLD: 10 K/UL (ref 4–11)

## 2019-04-12 PROCEDURE — 2580000003 HC RX 258: Performed by: INTERNAL MEDICINE

## 2019-04-12 PROCEDURE — 6360000002 HC RX W HCPCS: Performed by: INTERNAL MEDICINE

## 2019-04-12 PROCEDURE — 92526 ORAL FUNCTION THERAPY: CPT

## 2019-04-12 PROCEDURE — 94761 N-INVAS EAR/PLS OXIMETRY MLT: CPT

## 2019-04-12 PROCEDURE — 2700000000 HC OXYGEN THERAPY PER DAY

## 2019-04-12 PROCEDURE — 6360000002 HC RX W HCPCS: Performed by: FAMILY MEDICINE

## 2019-04-12 PROCEDURE — 2060000000 HC ICU INTERMEDIATE R&B

## 2019-04-12 PROCEDURE — 80048 BASIC METABOLIC PNL TOTAL CA: CPT

## 2019-04-12 PROCEDURE — 2580000003 HC RX 258: Performed by: FAMILY MEDICINE

## 2019-04-12 PROCEDURE — 85027 COMPLETE CBC AUTOMATED: CPT

## 2019-04-12 PROCEDURE — 97530 THERAPEUTIC ACTIVITIES: CPT

## 2019-04-12 PROCEDURE — 97110 THERAPEUTIC EXERCISES: CPT

## 2019-04-12 PROCEDURE — 36415 COLL VENOUS BLD VENIPUNCTURE: CPT

## 2019-04-12 PROCEDURE — 97163 PT EVAL HIGH COMPLEX 45 MIN: CPT

## 2019-04-12 PROCEDURE — 6370000000 HC RX 637 (ALT 250 FOR IP): Performed by: FAMILY MEDICINE

## 2019-04-12 PROCEDURE — 6370000000 HC RX 637 (ALT 250 FOR IP): Performed by: INTERNAL MEDICINE

## 2019-04-12 RX ADMIN — HYDROMORPHONE HYDROCHLORIDE 1 MG: 2 INJECTION INTRAMUSCULAR; INTRAVENOUS; SUBCUTANEOUS at 11:45

## 2019-04-12 RX ADMIN — Medication 1 TABLET: at 09:06

## 2019-04-12 RX ADMIN — QUETIAPINE FUMARATE 25 MG: 25 TABLET ORAL at 22:51

## 2019-04-12 RX ADMIN — OXYCODONE HYDROCHLORIDE AND ACETAMINOPHEN 500 MG: 500 TABLET ORAL at 09:07

## 2019-04-12 RX ADMIN — AMANTADINE HYDROCHLORIDE 100 MG: 50 SOLUTION ORAL at 09:08

## 2019-04-12 RX ADMIN — SODIUM CHLORIDE, PRESERVATIVE FREE 10 ML: 5 INJECTION INTRAVENOUS at 06:01

## 2019-04-12 RX ADMIN — Medication 10 ML: at 09:08

## 2019-04-12 RX ADMIN — VANCOMYCIN HYDROCHLORIDE 1000 MG: 1 INJECTION, POWDER, LYOPHILIZED, FOR SOLUTION INTRAVENOUS at 01:48

## 2019-04-12 RX ADMIN — HYDROMORPHONE HYDROCHLORIDE 1 MG: 2 INJECTION INTRAMUSCULAR; INTRAVENOUS; SUBCUTANEOUS at 22:36

## 2019-04-12 RX ADMIN — SENNOSIDES AND DOCUSATE SODIUM 1 TABLET: 8.6; 5 TABLET ORAL at 22:42

## 2019-04-12 RX ADMIN — ATORVASTATIN CALCIUM 80 MG: 80 TABLET, FILM COATED ORAL at 22:46

## 2019-04-12 RX ADMIN — OLANZAPINE 2.5 MG: 5 TABLET, FILM COATED ORAL at 09:07

## 2019-04-12 RX ADMIN — PIPERACILLIN AND TAZOBACTAM 3.38 G: 3; .375 INJECTION, POWDER, FOR SOLUTION INTRAVENOUS at 00:11

## 2019-04-12 RX ADMIN — LACOSAMIDE 100 MG: 50 TABLET, FILM COATED ORAL at 09:07

## 2019-04-12 RX ADMIN — PANTOPRAZOLE SODIUM 40 MG: 40 GRANULE, DELAYED RELEASE ORAL at 09:07

## 2019-04-12 RX ADMIN — PIPERACILLIN AND TAZOBACTAM 3.38 G: 3; .375 INJECTION, POWDER, FOR SOLUTION INTRAVENOUS at 17:16

## 2019-04-12 RX ADMIN — OLANZAPINE 2.5 MG: 5 TABLET, FILM COATED ORAL at 22:43

## 2019-04-12 RX ADMIN — TIZANIDINE 2 MG: 4 TABLET ORAL at 09:07

## 2019-04-12 RX ADMIN — HYDROMORPHONE HYDROCHLORIDE 1 MG: 2 INJECTION INTRAMUSCULAR; INTRAVENOUS; SUBCUTANEOUS at 06:01

## 2019-04-12 RX ADMIN — PREDNISONE 30 MG: 10 TABLET ORAL at 09:07

## 2019-04-12 RX ADMIN — SULFAMETHOXAZOLE AND TRIMETHOPRIM 1 TABLET: 800; 160 TABLET ORAL at 09:07

## 2019-04-12 RX ADMIN — BACLOFEN 20 MG: 10 TABLET ORAL at 09:07

## 2019-04-12 RX ADMIN — AMANTADINE HYDROCHLORIDE 100 MG: 50 SOLUTION ORAL at 22:52

## 2019-04-12 RX ADMIN — VANCOMYCIN HYDROCHLORIDE 1000 MG: 1 INJECTION, POWDER, LYOPHILIZED, FOR SOLUTION INTRAVENOUS at 14:58

## 2019-04-12 RX ADMIN — Medication 1000 MG: at 22:52

## 2019-04-12 RX ADMIN — HYDROMORPHONE HYDROCHLORIDE 1 MG: 2 INJECTION INTRAMUSCULAR; INTRAVENOUS; SUBCUTANEOUS at 01:44

## 2019-04-12 RX ADMIN — ASPIRIN 81 MG 81 MG: 81 TABLET ORAL at 09:07

## 2019-04-12 RX ADMIN — PIPERACILLIN AND TAZOBACTAM 3.38 G: 3; .375 INJECTION, POWDER, FOR SOLUTION INTRAVENOUS at 06:01

## 2019-04-12 RX ADMIN — TIZANIDINE 2 MG: 4 TABLET ORAL at 14:58

## 2019-04-12 RX ADMIN — PIPERACILLIN AND TAZOBACTAM 3.38 G: 3; .375 INJECTION, POWDER, FOR SOLUTION INTRAVENOUS at 11:45

## 2019-04-12 RX ADMIN — FOLIC ACID 1 MG: 1 TABLET ORAL at 09:07

## 2019-04-12 RX ADMIN — OXYCODONE HYDROCHLORIDE AND ACETAMINOPHEN 500 MG: 500 TABLET ORAL at 22:45

## 2019-04-12 RX ADMIN — TIZANIDINE 2 MG: 4 TABLET ORAL at 22:51

## 2019-04-12 RX ADMIN — OXYCODONE HYDROCHLORIDE 5 MG: 5 TABLET ORAL at 20:01

## 2019-04-12 RX ADMIN — FLUOXETINE HYDROCHLORIDE 20 MG: 20 SOLUTION ORAL at 09:07

## 2019-04-12 RX ADMIN — SENNOSIDES AND DOCUSATE SODIUM 1 TABLET: 8.6; 5 TABLET ORAL at 09:07

## 2019-04-12 RX ADMIN — GABAPENTIN 200 MG: 250 SOLUTION ORAL at 14:58

## 2019-04-12 RX ADMIN — Medication 3 MG: at 22:42

## 2019-04-12 RX ADMIN — BACLOFEN 20 MG: 10 TABLET ORAL at 14:58

## 2019-04-12 RX ADMIN — GABAPENTIN 200 MG: 250 SOLUTION ORAL at 23:19

## 2019-04-12 RX ADMIN — BACLOFEN 20 MG: 10 TABLET ORAL at 22:45

## 2019-04-12 RX ADMIN — HYDROMORPHONE HYDROCHLORIDE 1 MG: 2 INJECTION INTRAMUSCULAR; INTRAVENOUS; SUBCUTANEOUS at 17:13

## 2019-04-12 RX ADMIN — GABAPENTIN 200 MG: 250 SOLUTION ORAL at 09:07

## 2019-04-12 RX ADMIN — Medication 10 ML: at 22:37

## 2019-04-12 RX ADMIN — LACOSAMIDE 100 MG: 50 TABLET, FILM COATED ORAL at 22:44

## 2019-04-12 RX ADMIN — Medication 1000 MG: at 09:08

## 2019-04-12 ASSESSMENT — PAIN SCALES - GENERAL
PAINLEVEL_OUTOF10: 7
PAINLEVEL_OUTOF10: 0
PAINLEVEL_OUTOF10: 8
PAINLEVEL_OUTOF10: 10
PAINLEVEL_OUTOF10: 7
PAINLEVEL_OUTOF10: 6
PAINLEVEL_OUTOF10: 7
PAINLEVEL_OUTOF10: 10
PAINLEVEL_OUTOF10: 10
PAINLEVEL_OUTOF10: 0
PAINLEVEL_OUTOF10: 0
PAINLEVEL_OUTOF10: 9

## 2019-04-12 ASSESSMENT — PAIN DESCRIPTION - LOCATION
LOCATION: BUTTOCKS;BACK
LOCATION: BUTTOCKS

## 2019-04-12 ASSESSMENT — PAIN DESCRIPTION - PAIN TYPE: TYPE: ACUTE PAIN

## 2019-04-12 ASSESSMENT — PAIN DESCRIPTION - ORIENTATION: ORIENTATION: MID;UPPER

## 2019-04-12 NOTE — CARE COORDINATION
RN ANGELO spoke with PT who was unable to work with the Pt this am as she was in too much pain and unable to even sit at the bedside to participate in therapy. At this point the Pt is not meeting criteria for skilled placement, and she will not be able to return to T to continue her SNF stay. The Pt still has altered mental status. Call placed to family.  for [facility] is full and cannot accept messages. Home phone number to contact Jb Alli is disconnected. This RN CM contacted Pt's son Stacy Lucero who is going to try to get a hold of family and have them call this RN ANGELO back to determine proper discharge plan.

## 2019-04-12 NOTE — FLOWSHEET NOTE
04/12/19 9816   Vital Signs   Patient Currently in Pain Yes   Pain Assessment   Pain Assessment 0-10   Pain Level 8   Pain Type Acute pain   Pain Location Buttocks   Pain Orientation Mid;Upper   Non-Pharmaceutical Pain Intervention(s) Ambulation/Increased Activity;Repositioned; Therapeutic presence   Patient resting in bed, denies pain, SOB and chest pain. Patient has a non productive cough, no fever.  WCM

## 2019-04-12 NOTE — PLAN OF CARE
Pt educated on importance of turning q2h, cleanse skin & apply barrier cream after each incontinent episode, BID dressing changes to sacrum inspect skin per unit guidelines.

## 2019-04-12 NOTE — PROGRESS NOTES
Physical Therapy    Facility/Department: [place] [address] PCU  Initial Assessment, treatment, DC summary    NAME: Ron Singer  : 1965  MRN: [id number]    Date of Service: 2019    Discharge Recommendations:  ECF with PT        Assessment   Body structures, Functions, Activity limitations: Decreased functional mobility ; Decreased cognition;Decreased ADL status; Decreased endurance;Decreased ROM; Decreased strength;Decreased balance; Increased Pain;Decreased vision/visual deficit  Assessment: Pt referred forPT evaluation during current hospital stay with a diagnosis of SVT. Pt currently unalbe to perform any mobility, crying out in pain, able to tolerate PROM in limited ROM. This appeard to be pt's baseline, but pt is poor historian. Pt is not appropriate for further skilled PT services in acute setting at this time. Please re order if pt is appropriate and able to perform mobility. Recommend LTAC with PT at MI from acute setting   Treatment Diagnosis: decreased mobility  Prognosis: Poor  Decision Making: High Complexity  Patient Education: role of PT  Barriers to Learning: cognition  No Skilled PT: At baseline function(unable to participate in mobility at this time, appears to be at or near baseline. )  REQUIRES PT FOLLOW UP: No  Activity Tolerance  Activity Tolerance: Patient limited by pain; Patient limited by cognitive status       Patient Diagnosis(es): There were no encounter diagnoses. has a past medical history of Anorexia, Arthritis, Bulging lumbar disc, C. difficile diarrhea, Cancer ([address] Cerebral artery occlusion with cerebral infarction ([address] Chronic kidney disease, Hypertriglyceridemia, Post traumatic stress disorder, Seasonal allergies, Seizure disorder ([address] and UTI (urinary tract infection). has a past surgical history that includes Tubal ligation; Cystoscopy; Cystoscopy (13);  Bladder surgery; and Cystoscopy (11-). Restrictions  Restrictions/Precautions  Restrictions/Precautions: General Precautions  Position Activity Restriction  Other position/activity restrictions: up with assistance; SZD  Vision/Hearing  Vision: Impaired  Vision Exceptions: Wears glasses for reading  Hearing: Within functional limits     Subjective  General  Chart Reviewed: Yes  Patient assessed for rehabilitation services?: Yes  Response To Previous Treatment: Not applicable  Family / Caregiver Present: No  Referring Practitioner: Sudhakar Lino MD  Referral Date : 04/11/19  Follows Commands: Within Functional Limits  Other (Comment): Pt very confused, reporting that he daughter feeds, dresses and bathes her, but that she watches her grandchildren while daughter goes to work. Only oriented to self, but able to follow commands  General Comment  Comments: Pt resting in bed upon entry  Subjective  Subjective: Pt agreeable to PT  Pain Screening  Patient Currently in Pain: Yes  Pain Assessment  Pain Assessment: 0-10  Pain Level: (Pt reporting 8/10 at rest at base of spine, but pt crying out in pain when therapist attempting to move legs or to roll in bed)  Pain Type: Acute pain  Pain Location: Buttocks  Pain Orientation: Mid;Upper  Non-Pharmaceutical Pain Intervention(s): Ambulation/Increased Activity;Repositioned; Therapeutic presence  Vital Signs  Patient Currently in Pain: Yes  Pre Treatment Pain Screening  Intervention List: Patient able to continue with treatment    Orientation  Orientation  Overall Orientation Status: Impaired  Orientation Level: Disoriented to situation;Oriented to person;Disoriented to place; Disoriented to time  Social/Functional History  Social/Functional History  Lives With: Daughter  Type of Home: House  Home Layout: One level  Home Access: Stairs to enter with rails  Entrance Stairs - Number of Steps: 2  Bathroom Shower/Tub: Tub/Shower unit, Shower chair with back  Bathroom Toilet: Standard  Bathroom Equipment: 3-in-1 commode, Grab bars in shower  Home Equipment: Rolling walker, Wheelchair-manual  Receives Help From: Family(daughter)  ADL Assistance: (daughter bathes, dresses, feeds, and toilets pt)  Homemaking Responsibilities: No  Ambulation Assistance: (non ambulatory for last 3 months)  Transfer Assistance: (max A for bed to [facility])  Active : No  Additional Comments: Pt is a questionable historian, providing conflicting information. Pt reporting year as 45 and then 2002. , her daughter helps her get dressed and feeds her, and she spends most time in bed, but states she watches the babies when daughter goes to work. Will continue to assess PLOF should cognition improve and as family is available. Per CM note, pt was in SNF care immediately prior to admission. Objective          AROM RLE (degrees)  RLE General AROM: decreased by approx 90% throughout  AROM LLE (degrees)  LLE General AROM: decreased by approx 80% throughout  Strength RLE  Comment: grossly 0-1/5 throughout  Strength LLE  Comment: grossly 2-/5 throughout        Bed mobility  Rolling to Right: Unable to assess(attempted, pt crying out in pain, stating \"I can't do this today\")  Supine to Sit: Unable to assess(attempted, pt crying out in pain, stating \"I can't do this today\")  Sit to Supine: Unable to assess  Scooting: Unable to assess  Transfers  Sit to Stand: Unable to assess  Stand to sit: Unable to assess  Bed to Chair: Unable to assess  Ambulation  Ambulation?: No(pt non ambulatory at baseline)     Balance  Comments: Pt unable/ unwilling to transfer EOB, attempted, pt cryin gout in pain, therefore unable to assess balance  Exercises  Comments: Performed PROM B hips, knees, ankles x 10 reps each in all directions; pt initially very stiff, crying out in pain, but lessened with gentle PROM in limited ROM     Plan   Plan  Times per week: n/a  Safety Devices  Type of devices:  All fall risk precautions in place, Call light within reach, Left in bed, Nurse notified, Bed alarm in place      AM-PAC Score     AM-PAC Inpatient Mobility without Stair Climbing Raw Score : 5  AM-PAC Inpatient without Stair Climbing T-Scale Score : 23.59  Mobility Inpatient CMS 0-100% Score: 100  Mobility Inpatient without Stair CMS G-Code Modifier : CN       Goals  Short term goals  Time Frame for Short term goals: 4/12/19  Short term goal 1: Pt will tolerate PROM of LE's; goal met 4/12/19  Patient Goals   Patient goals : \"to walk\"       Therapy Time   Individual Concurrent Group Co-treatment   Time In 0908         Time Out 0942         Minutes 34         Timed Code Treatment Minutes: 24 Minutes    If pt is discharged prior to next therapy session, this note will serve as discharge summary.     Monica De Los Santos, PT

## 2019-04-12 NOTE — PLAN OF CARE
Problem: Pain:  Goal: Pain level will decrease  Description  Pain level will decrease  Outcome: Ongoing  Goal: Control of acute pain  Description  Control of acute pain  Outcome: Ongoing  Goal: Control of chronic pain  Description  Control of chronic pain  Outcome: Ongoing     Problem: Confusion - Acute:  Goal: Absence of continued neurological deterioration signs and symptoms  Description  Absence of continued neurological deterioration signs and symptoms  Outcome: Ongoing  Goal: Mental status will be restored to baseline  Description  Mental status will be restored to baseline  Outcome: Ongoing     Problem: Discharge Planning:  Goal: Ability to perform activities of daily living will improve  Description  Ability to perform activities of daily living will improve  Outcome: Ongoing  Goal: Participates in care planning  Description  Participates in care planning  Outcome: Ongoing     Problem: Injury - Risk of, Physical Injury:  Goal: Absence of physical injury  Description  Absence of physical injury  Outcome: Ongoing  Goal: Will remain free from falls  Description  Will remain free from falls  Outcome: Ongoing     Problem: Falls - Risk of:  Goal: Absence of physical injury  Description  Absence of physical injury  Outcome: Ongoing  Goal: Will remain free from falls  Description  Will remain free from falls  Outcome: Ongoing

## 2019-04-12 NOTE — PROGRESS NOTES
Speech Language Pathology  Facility/Department: [facility] C4 PCU  Dysphagia Daily Treatment Note    NAME: Kimberli Chino  : 1965  MRN: [id number]    Patient Diagnosis(es):   Patient Active Problem List    Diagnosis Date Noted    Chronic distal aortic occlusion ([facility] 2019    Severe sepsis ([facility] 2019    UTI (urinary tract infection) 2019    Seizure disorder ([facility] 2019    Encephalopathy, metabolic     Remote history of stroke     Decreased thyroid stimulating hormone (TSH) level 10/05/2017    Hypertriglyceridemia 10/05/2017    Episodic paroxysmal anxiety disorder 10/05/2017    Cerebral artery occlusion with cerebral infarction ([facility] 10/05/2017    Anorexia 10/05/2017    Arthritis 10/05/2017    Brachial-basilar insufficiency syndrome 2017    Chronic low back pain 2016     Allergies: Allergies   Allergen Reactions    Acetaminophen     Ativan [Lorazepam] Swelling    Codeine Hives    Darvocet [Propoxyphene N-Acetaminophen]     Methadone     Ultram [Tramadol] Swelling    Lodine [Etodolac] Rash     Onset Date: admit date 19  Current Diet Level:  Mechanical soft food textures (dysphagia diet II) with thin liquids; strict aspiration precautions    Pain: No c/o pain during tx session    Diet Tolerance:  Patient tolerating current diet level without signs/symptoms of penetration/aspiration per chart. P.O. Trials: Thin   x Spoon x 2, cup sip x 8   Puree   x Magic cup   x1 bite mashed potatoes   Semi-Solid   x X10 bites soft pears     Impressions:  Pt initially asleep upon SLP entry, easily woke to SLP's vioce. Per RN note this date, \"Patient has a increased cough, lungs are inspiratory and expiratory wheezing with some rhonchi\". Pt continues on 1 L O2 NC, WBC 10.0, CXR from 4/10/19 revealed: \"no acute disease\". Pt observed with thin liquid trials via tsp and cup sip with grossly timely swallow initiation. x2 delayed cough noted.   Pt also observed with entire magic cup and several bites of soft pears-- mildly reduced A-P bolus transit with soft solid trials, x1 delayed cough with puree. Pt may benefit from instrumental swallow assessment to instrumentally assess the swallow-- pt's seizure disorder would be a contraindication for FEES, would need neuro clearance. Pt may be able to participate in MBSS 2/2 ability to tolerate upright positioning this date. Will continue to monitor. Continue current diet, strict aspiration precautions. If pt has overt s/s of aspiration with PO intake or worsening respiratory status, recommend downgrade to strict NPO pending re-assessment by ST. Patient/Family/Caregiver Education:  SLP re: role of ST, aspiration precautions, rationale for possible instrumental swallow assessment. Pt verbalized understanding, needs reinforcement. Compensatory Swallowing Strategies  · Alternate solids and liquids;  · Small bites/sips;  · Upright as possible for all oral intake;  · Total feed;  · Remain upright for 30-45 minutes after meals;  · Eat/Feed slowly     Treatment/Goals  Short-term Goals  Timeframe for Short-term Goals: 2-3 sessions  1. The patient will tolerate recommended diet without observed clinical signs of aspiration; Today, 04/12: Progressing. Ongoing. 2. The patient/caregiver will demonstrate understanding of compensatory strategies for improved swallowing safety.; Today, 04/12: Progressing. Ongoing. 3. The patient will tolerate regular consistency solids 10/10. Today, 04/12: Not directly targeted this date. Long-term Goals  Timeframe for Long-term Goals: 1 week  Goal 1: The pt will tolerate safest and least restrictive diet without s/s of aspiration. Today, 04/12: Progressing. Ongoing. Plan:  Continued daily Dysphagia treatment with goals per plan of care. If patient is discharged prior to next treatment session, this note serves as treatment and discharge summary.     Treatment time:  2396-9909 (05

## 2019-04-12 NOTE — PROGRESS NOTES
Carol Gleason MD Patient has a increased cough, lungs are inspiratory and expiratory wheezing with some rhonchi . VSS and no fever.

## 2019-04-13 LAB
ANION GAP SERPL CALCULATED.3IONS-SCNC: 10 MMOL/L (ref 3–16)
BUN BLDV-MCNC: 19 MG/DL (ref 7–20)
CALCIUM SERPL-MCNC: 8.9 MG/DL (ref 8.3–10.6)
CHLORIDE BLD-SCNC: 108 MMOL/L (ref 99–110)
CO2: 28 MMOL/L (ref 21–32)
CREAT SERPL-MCNC: 0.6 MG/DL (ref 0.6–1.1)
GFR AFRICAN AMERICAN: >60
GFR NON-AFRICAN AMERICAN: >60
GLUCOSE BLD-MCNC: 112 MG/DL (ref 70–99)
GLUCOSE BLD-MCNC: 117 MG/DL (ref 70–99)
GLUCOSE BLD-MCNC: 123 MG/DL (ref 70–99)
GLUCOSE BLD-MCNC: 132 MG/DL (ref 70–99)
HCT VFR BLD CALC: 33.9 % (ref 36–48)
HEMOGLOBIN: 10.3 G/DL (ref 12–16)
MAGNESIUM: 2.1 MG/DL (ref 1.8–2.4)
MCH RBC QN AUTO: 23.3 PG (ref 26–34)
MCHC RBC AUTO-ENTMCNC: 30.5 G/DL (ref 31–36)
MCV RBC AUTO: 76.6 FL (ref 80–100)
PDW BLD-RTO: 21.8 % (ref 12.4–15.4)
PERFORMED ON: ABNORMAL
PLATELET # BLD: 331 K/UL (ref 135–450)
PMV BLD AUTO: 8.2 FL (ref 5–10.5)
POTASSIUM REFLEX MAGNESIUM: 3.5 MMOL/L (ref 3.5–5.1)
RBC # BLD: 4.42 M/UL (ref 4–5.2)
SODIUM BLD-SCNC: 146 MMOL/L (ref 136–145)
WBC # BLD: 13.3 K/UL (ref 4–11)

## 2019-04-13 PROCEDURE — 83735 ASSAY OF MAGNESIUM: CPT

## 2019-04-13 PROCEDURE — 6370000000 HC RX 637 (ALT 250 FOR IP): Performed by: INTERNAL MEDICINE

## 2019-04-13 PROCEDURE — 6360000002 HC RX W HCPCS: Performed by: FAMILY MEDICINE

## 2019-04-13 PROCEDURE — 2580000003 HC RX 258: Performed by: INTERNAL MEDICINE

## 2019-04-13 PROCEDURE — 80048 BASIC METABOLIC PNL TOTAL CA: CPT

## 2019-04-13 PROCEDURE — 6370000000 HC RX 637 (ALT 250 FOR IP): Performed by: HOSPITALIST

## 2019-04-13 PROCEDURE — 85027 COMPLETE CBC AUTOMATED: CPT

## 2019-04-13 PROCEDURE — 94761 N-INVAS EAR/PLS OXIMETRY MLT: CPT

## 2019-04-13 PROCEDURE — 6370000000 HC RX 637 (ALT 250 FOR IP): Performed by: FAMILY MEDICINE

## 2019-04-13 PROCEDURE — 2060000000 HC ICU INTERMEDIATE R&B

## 2019-04-13 PROCEDURE — 2700000000 HC OXYGEN THERAPY PER DAY

## 2019-04-13 PROCEDURE — 6360000002 HC RX W HCPCS: Performed by: INTERNAL MEDICINE

## 2019-04-13 PROCEDURE — 92526 ORAL FUNCTION THERAPY: CPT

## 2019-04-13 PROCEDURE — 36415 COLL VENOUS BLD VENIPUNCTURE: CPT

## 2019-04-13 RX ORDER — OXYCODONE HYDROCHLORIDE 5 MG/1
10 TABLET ORAL EVERY 4 HOURS PRN
Status: DISCONTINUED | OUTPATIENT
Start: 2019-04-13 | End: 2019-04-16 | Stop reason: HOSPADM

## 2019-04-13 RX ORDER — POTASSIUM CHLORIDE 20 MEQ/1
40 TABLET, EXTENDED RELEASE ORAL ONCE
Status: COMPLETED | OUTPATIENT
Start: 2019-04-13 | End: 2019-04-13

## 2019-04-13 RX ORDER — MULTIVIT-MIN/FERROUS GLUCONATE 9 MG/15 ML
15 LIQUID (ML) ORAL DAILY
Status: DISCONTINUED | OUTPATIENT
Start: 2019-04-13 | End: 2019-04-16 | Stop reason: HOSPADM

## 2019-04-13 RX ORDER — SENNA AND DOCUSATE SODIUM 50; 8.6 MG/1; MG/1
1 TABLET, FILM COATED ORAL 2 TIMES DAILY PRN
Status: DISCONTINUED | OUTPATIENT
Start: 2019-04-13 | End: 2019-04-16 | Stop reason: HOSPADM

## 2019-04-13 RX ORDER — MULTIVIT-MIN/FERROUS GLUCONATE 9 MG/15 ML
15 LIQUID (ML) ORAL DAILY
Status: DISCONTINUED | OUTPATIENT
Start: 2019-04-13 | End: 2019-04-13 | Stop reason: CLARIF

## 2019-04-13 RX ADMIN — Medication 1000 MG: at 09:11

## 2019-04-13 RX ADMIN — POTASSIUM CHLORIDE 40 MEQ: 20 TABLET, EXTENDED RELEASE ORAL at 10:05

## 2019-04-13 RX ADMIN — OXYCODONE HYDROCHLORIDE 10 MG: 5 TABLET ORAL at 13:44

## 2019-04-13 RX ADMIN — Medication 10 ML: at 13:48

## 2019-04-13 RX ADMIN — PREDNISONE 30 MG: 10 TABLET ORAL at 09:10

## 2019-04-13 RX ADMIN — TIZANIDINE 2 MG: 4 TABLET ORAL at 09:10

## 2019-04-13 RX ADMIN — OLANZAPINE 2.5 MG: 5 TABLET, FILM COATED ORAL at 21:39

## 2019-04-13 RX ADMIN — ASPIRIN 81 MG 81 MG: 81 TABLET ORAL at 09:11

## 2019-04-13 RX ADMIN — BACLOFEN 20 MG: 10 TABLET ORAL at 21:39

## 2019-04-13 RX ADMIN — FLUOXETINE HYDROCHLORIDE 20 MG: 20 SOLUTION ORAL at 09:11

## 2019-04-13 RX ADMIN — OXYCODONE HYDROCHLORIDE 5 MG: 5 TABLET ORAL at 03:35

## 2019-04-13 RX ADMIN — BACLOFEN 20 MG: 10 TABLET ORAL at 13:45

## 2019-04-13 RX ADMIN — OXYCODONE HYDROCHLORIDE AND ACETAMINOPHEN 500 MG: 500 TABLET ORAL at 09:11

## 2019-04-13 RX ADMIN — FOLIC ACID 1 MG: 1 TABLET ORAL at 09:11

## 2019-04-13 RX ADMIN — QUETIAPINE FUMARATE 25 MG: 25 TABLET ORAL at 21:39

## 2019-04-13 RX ADMIN — Medication 15 ML: at 13:45

## 2019-04-13 RX ADMIN — GABAPENTIN 200 MG: 250 SOLUTION ORAL at 10:05

## 2019-04-13 RX ADMIN — AMANTADINE HYDROCHLORIDE 100 MG: 50 SOLUTION ORAL at 09:20

## 2019-04-13 RX ADMIN — Medication 1000 MG: at 21:40

## 2019-04-13 RX ADMIN — Medication 10 ML: at 21:41

## 2019-04-13 RX ADMIN — ATORVASTATIN CALCIUM 80 MG: 80 TABLET, FILM COATED ORAL at 21:39

## 2019-04-13 RX ADMIN — Medication 3 MG: at 21:39

## 2019-04-13 RX ADMIN — LACOSAMIDE 100 MG: 50 TABLET, FILM COATED ORAL at 09:17

## 2019-04-13 RX ADMIN — HYDROMORPHONE HYDROCHLORIDE 1 MG: 2 INJECTION INTRAMUSCULAR; INTRAVENOUS; SUBCUTANEOUS at 04:54

## 2019-04-13 RX ADMIN — HYDROMORPHONE HYDROCHLORIDE 1 MG: 2 INJECTION INTRAMUSCULAR; INTRAVENOUS; SUBCUTANEOUS at 11:24

## 2019-04-13 RX ADMIN — SODIUM CHLORIDE, PRESERVATIVE FREE 10 ML: 5 INJECTION INTRAVENOUS at 04:55

## 2019-04-13 RX ADMIN — TIZANIDINE 2 MG: 4 TABLET ORAL at 21:39

## 2019-04-13 RX ADMIN — TIZANIDINE 2 MG: 4 TABLET ORAL at 13:48

## 2019-04-13 RX ADMIN — GABAPENTIN 200 MG: 250 SOLUTION ORAL at 13:44

## 2019-04-13 RX ADMIN — BACLOFEN 20 MG: 10 TABLET ORAL at 09:10

## 2019-04-13 RX ADMIN — PANTOPRAZOLE SODIUM 40 MG: 40 GRANULE, DELAYED RELEASE ORAL at 09:11

## 2019-04-13 RX ADMIN — APIXABAN 5 MG: 5 TABLET, FILM COATED ORAL at 21:39

## 2019-04-13 RX ADMIN — OLANZAPINE 2.5 MG: 5 TABLET, FILM COATED ORAL at 09:10

## 2019-04-13 RX ADMIN — IRON SUCROSE 100 MG: 20 INJECTION, SOLUTION INTRAVENOUS at 10:05

## 2019-04-13 RX ADMIN — AMANTADINE HYDROCHLORIDE 100 MG: 50 SOLUTION ORAL at 21:40

## 2019-04-13 RX ADMIN — LACOSAMIDE 100 MG: 50 TABLET, FILM COATED ORAL at 21:39

## 2019-04-13 RX ADMIN — OXYCODONE HYDROCHLORIDE 10 MG: 5 TABLET ORAL at 21:39

## 2019-04-13 RX ADMIN — APIXABAN 5 MG: 5 TABLET, FILM COATED ORAL at 09:11

## 2019-04-13 RX ADMIN — OXYCODONE HYDROCHLORIDE AND ACETAMINOPHEN 500 MG: 500 TABLET ORAL at 21:39

## 2019-04-13 RX ADMIN — OXYCODONE HYDROCHLORIDE 5 MG: 5 TABLET ORAL at 09:11

## 2019-04-13 ASSESSMENT — PAIN SCALES - GENERAL
PAINLEVEL_OUTOF10: 7
PAINLEVEL_OUTOF10: 7
PAINLEVEL_OUTOF10: 3
PAINLEVEL_OUTOF10: 0
PAINLEVEL_OUTOF10: 0
PAINLEVEL_OUTOF10: 7
PAINLEVEL_OUTOF10: 7
PAINLEVEL_OUTOF10: 5
PAINLEVEL_OUTOF10: 5
PAINLEVEL_OUTOF10: 10
PAINLEVEL_OUTOF10: 6
PAINLEVEL_OUTOF10: 6

## 2019-04-13 ASSESSMENT — PAIN DESCRIPTION - PAIN TYPE
TYPE: CHRONIC PAIN
TYPE: CHRONIC PAIN

## 2019-04-13 ASSESSMENT — PAIN DESCRIPTION - LOCATION: LOCATION: BACK;BUTTOCKS;CHEST

## 2019-04-13 NOTE — PROGRESS NOTES
Hospitalist Progress Note      PCP: Latoya Dailey    Date of Admission: 4/6/2019    Chief Complaint: lethargy    Hospital Course: 53 y. o. female with PMHx of CVA w/ R hemiparesis, seizure disorder, SVT, HTN, Chronic Hematuria with prior Bladder Cancer, chronic randle, and Vasculopathy; working diagnosis of Giant Cell Arthritis vs. Atypical Takayasu. She has had multiple prolonged hospitalizations at  over the past few months. She was previously found to have numerous thrombosed vessels in her brain as well as in her subclavian and infrarenal AAA. 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 Followed by [facility] R[facility]ology and currently on chronic Prednisone and Eliquis. She was most recently discharged from [facility] on 3/23/19 after treatment for Sepsis, to [facility]. She presented to the ED on 4/6/19 with concerns for increased lethargy and diaphoresis. During ED evaluation, she was found to be tachycardic, which was controlled with cardizem gtt. Also during ED evaluation, she had witnessed seizure activity, which prompted transfer to [facility]. Subjective:   Some pains, worse with any movement. Pain meds helping.      Medications:  Reviewed    Infusion Medications    dextrose       Scheduled Medications    CENTRUM/CERTA-YANNICK with minerals oral  15 mL Oral Daily    apixaban  5 mg Oral BID    iron sucrose (VENOFER) iv piggyback 100 mL (Admin over 60 minutes)  100 mg Intravenous Q24H    sodium chloride  250 mL Intravenous Once    sulfamethoxazole-trimethoprim  1 tablet PEG Tube Once per day on Mon Wed Fri    OLANZapine  2.5 mg PEG Tube Q12H    sodium chloride flush  10 mL Intravenous 2 times per day    amantadine  100 mg Per G Tube Q12H    aspirin  81 mg Per G Tube Daily    atorvastatin  80 mg Per G Tube Nightly    baclofen  20 mg Per G Tube TID    pantoprazole sodium  40 mg Per G Tube Daily    FLUoxetine  20 mg Per G Tube Daily    folic acid  1 mg Per G Tube Daily    gabapentin  200 mg Per G Tube Q8H    lacosamide  100 mg Per G Tube BID    levETIRAcetam  1,000 mg Per G Tube BID    melatonin  3 mg Per G Tube Nightly    predniSONE  30 mg Per G Tube Daily    QUEtiapine  25 mg Per G Tube Nightly    sennosides-docusate sodium  1 tablet Per G Tube BID    tiZANidine  2 mg Per G Tube TID    vitamin C  500 mg Per G Tube BID     PRN Meds: oxyCODONE, hydrALAZINE, magnesium hydroxide, sodium chloride flush, ondansetron, HYDROmorphone, LORazepam, ipratropium-albuterol, glucose, dextrose, glucagon (rDNA), dextrose      Intake/Output Summary (Last 24 hours) at 4/13/2019 1308  Last data filed at 4/13/2019 0951  Gross per 24 hour   Intake 2270 ml   Output 1450 ml   Net 820 ml       Physical Exam Performed:    BP (!) 141/96   Pulse 112   Temp 99.1 °F (37.3 °C) (Axillary)   Resp 16   Ht 5' 2\" (1.575 m) Comment: per CareEverywhere  Wt 107 lb 12.8 oz (48.9 kg)   LMP 07/22/2013   SpO2 94%   Breastfeeding? No   BMI 19.72 kg/m²     General appearance: Awake, no signs of distress. HEENT:  Normal cephalic, atraumatic without obvious deformity. Pupils equal, round, and reactive to light. Extra ocular muscles intact. Conjunctivae/corneas clear. Neck: Supple, with full range of motion. No jugular venous distention. Trachea midline. Respiratory:  Normal respiratory effort. Clear to auscultation, bilaterally without Rales/Wheezes/Rhonchi. Cardiovascular:  Regular rate and rhythm with normal S1/S2 without murmurs, rubs or gallops. Abdomen: Soft, non-tender, non-distended with normal bowel sounds. Musculoskeletal:  No clubbing, cyanosis or edema bilaterally. Skin: decub ulcer   Neurologic: b/l UE weakness with contractures, no voluntary movement in legs   Psychiatric:  alert and more oriented, answers questions appropriately. offloading, and prompt incontinence care. -needs better pain control in effort for her to be able to work with PT, OT. Def needs rehabilitation, will inc prn Oxycodone dose, cont prn IV Dilaudid    DVT Prophylaxis: eliquis  Diet: DIET GENERAL; Dysphagia II Mechanically Altered  Diet Tube Feed Modular: Protein Modular  Dietary Nutrition Supplements: Frozen Oral Supplement  Diet Tube Feed Continuous/Cyclic w/ Diet  Code Status: Full Code    PT/OT Eval Status: Ongoing    Dispo - Likely ready for DC soon as Hb stable and Abx completed. Ideally she should return to SNF/ECF for ongoing recovery from her multiple recent hospitalizations. Inc pain regimen, retry PT, OT this weekend.     Ana Peoples MD

## 2019-04-13 NOTE — PROGRESS NOTES
Nearing baseline. Capillary Refill: Brisk,< 3 seconds   Peripheral Pulses: +2 palpable, equal bilaterally           Labs:   Recent Labs     04/10/19  0441 04/10/19  1529 04/11/19  0204 04/12/19  0438   WBC 7.9  --  10.2 10.0   HGB 6.9* 9.4* 10.4* 9.9*   HCT 23.0*  --  33.1* 32.0*     --  309 287     Recent Labs     04/10/19  0441 04/11/19  0204 04/12/19  0438    144 142   K 4.3 3.8 3.6    104 104   CO2 31 27 28   BUN 13 13 13   CREATININE <0.5* 0.6 <0.5*   CALCIUM 8.4 8.7 8.4     No results for input(s): AST, ALT, BILIDIR, BILITOT, ALKPHOS in the last 72 hours. No results for input(s): INR in the last 72 hours. No results for input(s): Lata Preethi in the last 72 hours. Urinalysis:      Lab Results   Component Value Date    NITRU Negative 04/06/2019    WBCUA >100 04/06/2019    BACTERIA 4+ 04/06/2019    RBCUA see below 04/06/2019    BLOODU LARGE 04/06/2019    SPECGRAV 1.025 04/06/2019    GLUCOSEU Negative 04/06/2019       Radiology:  XR CHEST PORTABLE   Final Result   No acute disease. CT HEAD WO CONTRAST   Final Result   No acute intracranial abnormality. IR ULTRASOUND GUIDANCE VASCULAR ACCESS   Final Result   Unsuccessful attempt at placement of left upper extremity PICC line due to   lack of visualization of vessel of sufficient size to facilitate placement of   PICC line as described. VL Extremity Venous Right   Final Result            Assessment/Plan:    Active Hospital Problems    Diagnosis Date Noted    Chronic distal aortic occlusion ([facility] [I74.09] 04/07/2019    Severe sepsis ([facility] [A41.9, R65.20] 04/07/2019    UTI (urinary tract infection) [N39.0] 04/07/2019    Seizure disorder ([facility] [G40.909] 04/07/2019    Encephalopathy, metabolic [Y04.70]     Remote history of stroke [Z86.73]      Severe sepsis, present on arrival. Possible UTI vs pneumonia. Meningitis unlikely.    - marked tachycardia, leukocytosis on presentation  - lactate 3.3 initially, improved to 2.2 with IVF  - Clinically improving, with no further leukocytosis, afebrile, improved mentation  - Completed empiric 7 day course of Vanc/Zosyn. No further Abx planned. - Pyuria but negative UCx (mixed shadi)  - CT abd/pel without source of infection noted. Seizure disorder  - last seizure while in ED. No activity noted overnight  - EEG shows diffuse encephalopathy. - continue vimpat, keppra, gabapentin  - neurology following    Acute hypoxic respiratory failure 2/2 above  - Wean O2 as tolerated. - nebs PRN ordered    Acute multifactorial encephalopathy with baseline vascular dementia  - sepsis vs post-ictal, less likely meningitis  - largely non-verbal at baseline. Came from [facility]  - continue to treat sepsis. No further seizure activity noted  - Mentation improved  and near baseline    Anemia: Progressive anemia with Hb decline from 9.4--> 6.9 since presentation. Anemia is chronic, with recent baseline around 8 at [facility]. Has had chronic hematuria requiring cysto/ablation recently. No current gross hematuria. No obvious GI bleeding. Has large sacral ulcer which could be responsible for slow blood loss as well. Occult Blood negative. Hb improved with transfusion of 1 unit PRBC. Fe studies low; will order Venofer daily x 3 days now that infection resolved. Tachycardia: Hx of same. Unclear if SVT or severe sinus tachycardia at present. Resolved. Cardiology consulted and now signed off. Unspecified Vasculitis/Vasculopathy: She was previously found to have numerous thrombosed vessels in her brain as well as in her subclavian and infrarenal AAA. 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 Followed by  Rhematology and currently on chronic Eliquis and Prednisone with Bactrim prophylaxis.    - Continue steroids, ASA, statin. - Eliquis on hold 4/10 given progressive anemia; will restart as Hb now stable. Right Upper Extremity SVT: Likely 2/2 PIV. Already on [facility], operated by [facility]. Continue Eliquis. Local care. Dysphagia  - continue nightly TF via PEG, tolerating well    Sacral Decubitus Ulcer: Stage 4 ulcer, currently under treatment by  Wound Care. Required multiple surgical debridements during hospitalization at [facility] 3/2019. Wound care consulted here. Continue local wound care measures, offloading, and prompt incontinence care. Continue Pain control. DVT Prophylaxis: eliquis  Diet: DIET GENERAL; Dysphagia II Mechanically Altered  Diet Tube Feed Modular: Protein Modular  Dietary Nutrition Supplements: Frozen Oral Supplement  Diet Tube Feed Continuous/Cyclic w/ Diet  Code Status: Full Code    PT/OT Eval Status: Ongoing    Dispo - Likely ready for DC soon as Hb stable and Abx completed. Ideally she should return to SNF/ECF for ongoing recovery from her multiple recent hospitalizations. However, as she is not currently participating much with therapy, it is not clear that she will qualify for SNF. Case Management to discuss with facility if she can be skilled based on her other needs - wound care, tube feeds, etc. Likely here through the weekend.      Grady Nash MD

## 2019-04-13 NOTE — PROGRESS NOTES
Speech Language Pathology  Facility/Department: [facility] C4 PCU  Dysphagia Daily Treatment Note    NAME: Shaheen Robin  : 1965  MRN: [id number]    Patient Diagnosis(es):   Patient Active Problem List    Diagnosis Date Noted    Chronic distal aortic occlusion ([facility] 2019    [facility]    UTI (urinary tract infection) 2019    Seizure disorder ([facility] 2019    Encephalopathy, metabolic     Remote history of stroke     Decreased thyroid stimulating hormone (TSH) level 10/05/2017    Hypertriglyceridemia 10/05/2017    Episodic paroxysmal anxiety disorder 10/05/2017    Cerebral artery occlusion with cerebral infarction ([facility] 10/05/2017    Anorexia 10/05/2017    Arthritis 10/05/2017    Brachial-basilar insufficiency syndrome 2017    Chronic low back pain 2016     Allergies: Allergies   Allergen Reactions    Acetaminophen     Ativan [Lorazepam] Swelling    Codeine Hives    Darvocet [Propoxyphene N-Acetaminophen]     Methadone     Ultram [Tramadol] Swelling    Lodine [Etodolac] Rash     Onset Date: admit date 19  Current Diet Level:  Mechanical soft food textures (dysphagia diet II) with thin liquids    Pain:  Pain Assessment: 0-10  Pain Level: 5  [place]-Baker Pain Rating: Hurts even more  Pain Type: Chronic pain  Pain Location: Back, Buttocks, Chest  Pain Orientation: Mid, Upper  Non-Pharmaceutical Pain Intervention(s): Repositioned  Response to Pain Intervention: Asleep with RR greater than 10  RN administered pain medications to pt    Diet Tolerance:  Patient not tolerating current diet without observed s/s of aspiration    Recommend: downgrade to NPO, utilize PEG tube for nutritional/hydration needs until pt will participate in evaluation of swallowing function    P.O. Trials:  Honey   x Spoon sip x 2     Impressions:  WBC up to 13.3 this date (19). There is no new CXR since 04/10/19.   RN in room upon SLP entering, administering meds to pt via PEG tube. Pt alert and calm, conversant prior to po trials. SLP notes deep, wet coughing at baseline. HOB is quite low, and after giving pt notice, SLP elevated HOB to 40 degrees. Pt screams out and becomes hysterical upon HOB being elevated, will not allow HOB to be elevated further. RN attempts, with SLP to try to explain to pt the need for assessment of swallowing. Pt still hysterical, crying: \"You're shutting me in a box! I'm never getting out of here! \"  Rn reports to SLP that pt was exhibiting s/s of aspiration with po intake this AM, including wet coughing post swallow with liquids. Pt eventually agreed to trial HTL via spoon (very small sip as HOB so low), and pt exhibits wet cough post swallow. Pt then begins screaming again and SLP discontinues follow-up. Slp would recommend downgrade to NPO, utilize PEG tube for nutritional/hydration needs until pt will participate in evaluation of swallowing function. Pt very high risk of aspiration when she will not allow HOB to be elevated. Pt likely not a good candidate for instrumental assessment due to inability to tolerate HOB elevation and psych issues. Patient/Family/Caregiver Education:  Reviewed plan to downgrade pt to NPO, and pt does not demonstrate comprehension. RN aware. Compensatory Strategies:  n/a    Treatment/Goals  Short-term Goals  Timeframe for Short-term Goals: 2-3 sessions  1. The patient will tolerate recommended diet without observed clinical signs of aspiration; Today, 04/13: Not meeting. Ongoing. 2. The patient/caregiver will demonstrate understanding of compensatory strategies for improved swallowing safety.; Today, 04/13: Not Progressing. Ongoing. 3. The patient will tolerate regular consistency solids 10/10. Today, 04/13: Not directly targeted this date. Long-term Goals  Timeframe for Long-term Goals: 1 week  Goal 1: The pt will tolerate safest and least restrictive diet without s/s of aspiration.  Today, 04/13: Not meeting. Ongoing.         Plan:  Continued daily Dysphagia treatment with goals per plan of care. If patient is discharged prior to next treatment session, this note serves as treatment and discharge summary. Time: 1015-1029, Dysphagia Tx    Susanna Starr, [address] [place] [phone]  Speech-Language Pathologist  (desk #): [phone]

## 2019-04-13 NOTE — PROGRESS NOTES
Bedside report received from [facility]. Patient resting comfortably in bed with eyes closed. No signs of discomfort or distress. Bed is in lowest position, wheels locked, 2/2 side rails up. Bedside table and call light within reach. White board updated. Will continue to monitor patient.  Marycruz Wolfe RN

## 2019-04-14 LAB
ANION GAP SERPL CALCULATED.3IONS-SCNC: 10 MMOL/L (ref 3–16)
BUN BLDV-MCNC: 21 MG/DL (ref 7–20)
CALCIUM SERPL-MCNC: 8.9 MG/DL (ref 8.3–10.6)
CHLORIDE BLD-SCNC: 104 MMOL/L (ref 99–110)
CO2: 29 MMOL/L (ref 21–32)
CREAT SERPL-MCNC: <0.5 MG/DL (ref 0.6–1.1)
GFR AFRICAN AMERICAN: >60
GFR NON-AFRICAN AMERICAN: >60
GLUCOSE BLD-MCNC: 125 MG/DL (ref 70–99)
GLUCOSE BLD-MCNC: 128 MG/DL (ref 70–99)
GLUCOSE BLD-MCNC: 91 MG/DL (ref 70–99)
HCT VFR BLD CALC: 34.9 % (ref 36–48)
HEMOGLOBIN: 10.7 G/DL (ref 12–16)
MCH RBC QN AUTO: 23.3 PG (ref 26–34)
MCHC RBC AUTO-ENTMCNC: 30.5 G/DL (ref 31–36)
MCV RBC AUTO: 76.2 FL (ref 80–100)
PDW BLD-RTO: 22 % (ref 12.4–15.4)
PERFORMED ON: ABNORMAL
PERFORMED ON: ABNORMAL
PLATELET # BLD: 347 K/UL (ref 135–450)
PMV BLD AUTO: 8.4 FL (ref 5–10.5)
POTASSIUM REFLEX MAGNESIUM: 4.4 MMOL/L (ref 3.5–5.1)
RBC # BLD: 4.58 M/UL (ref 4–5.2)
SODIUM BLD-SCNC: 143 MMOL/L (ref 136–145)
WBC # BLD: 13.1 K/UL (ref 4–11)

## 2019-04-14 PROCEDURE — 2580000003 HC RX 258: Performed by: INTERNAL MEDICINE

## 2019-04-14 PROCEDURE — 85027 COMPLETE CBC AUTOMATED: CPT

## 2019-04-14 PROCEDURE — 6370000000 HC RX 637 (ALT 250 FOR IP): Performed by: INTERNAL MEDICINE

## 2019-04-14 PROCEDURE — 2060000000 HC ICU INTERMEDIATE R&B

## 2019-04-14 PROCEDURE — 6360000002 HC RX W HCPCS: Performed by: FAMILY MEDICINE

## 2019-04-14 PROCEDURE — 36415 COLL VENOUS BLD VENIPUNCTURE: CPT

## 2019-04-14 PROCEDURE — 6370000000 HC RX 637 (ALT 250 FOR IP): Performed by: HOSPITALIST

## 2019-04-14 PROCEDURE — 80048 BASIC METABOLIC PNL TOTAL CA: CPT

## 2019-04-14 PROCEDURE — 6360000002 HC RX W HCPCS: Performed by: INTERNAL MEDICINE

## 2019-04-14 PROCEDURE — 6370000000 HC RX 637 (ALT 250 FOR IP): Performed by: FAMILY MEDICINE

## 2019-04-14 RX ORDER — HYDROXYZINE HYDROCHLORIDE 10 MG/1
50 TABLET, FILM COATED ORAL ONCE
Status: DISCONTINUED | OUTPATIENT
Start: 2019-04-14 | End: 2019-04-16 | Stop reason: HOSPADM

## 2019-04-14 RX ADMIN — OLANZAPINE 2.5 MG: 5 TABLET, FILM COATED ORAL at 20:52

## 2019-04-14 RX ADMIN — GABAPENTIN 200 MG: 250 SOLUTION ORAL at 09:08

## 2019-04-14 RX ADMIN — APIXABAN 5 MG: 5 TABLET, FILM COATED ORAL at 20:52

## 2019-04-14 RX ADMIN — OXYCODONE HYDROCHLORIDE AND ACETAMINOPHEN 500 MG: 500 TABLET ORAL at 09:07

## 2019-04-14 RX ADMIN — HYDROMORPHONE HYDROCHLORIDE 1 MG: 2 INJECTION INTRAMUSCULAR; INTRAVENOUS; SUBCUTANEOUS at 03:44

## 2019-04-14 RX ADMIN — IRON SUCROSE 100 MG: 20 INJECTION, SOLUTION INTRAVENOUS at 09:08

## 2019-04-14 RX ADMIN — GABAPENTIN 200 MG: 250 SOLUTION ORAL at 20:52

## 2019-04-14 RX ADMIN — BACLOFEN 20 MG: 10 TABLET ORAL at 09:07

## 2019-04-14 RX ADMIN — HYDROMORPHONE HYDROCHLORIDE 1 MG: 2 INJECTION INTRAMUSCULAR; INTRAVENOUS; SUBCUTANEOUS at 20:52

## 2019-04-14 RX ADMIN — OXYCODONE HYDROCHLORIDE AND ACETAMINOPHEN 500 MG: 500 TABLET ORAL at 20:52

## 2019-04-14 RX ADMIN — LACOSAMIDE 100 MG: 50 TABLET, FILM COATED ORAL at 20:52

## 2019-04-14 RX ADMIN — OXYCODONE HYDROCHLORIDE 10 MG: 5 TABLET ORAL at 02:07

## 2019-04-14 RX ADMIN — GABAPENTIN 200 MG: 250 SOLUTION ORAL at 15:01

## 2019-04-14 RX ADMIN — OXYCODONE HYDROCHLORIDE 10 MG: 5 TABLET ORAL at 09:07

## 2019-04-14 RX ADMIN — AMANTADINE HYDROCHLORIDE 100 MG: 50 SOLUTION ORAL at 20:52

## 2019-04-14 RX ADMIN — Medication 1000 MG: at 20:52

## 2019-04-14 RX ADMIN — Medication 15 ML: at 09:08

## 2019-04-14 RX ADMIN — PREDNISONE 30 MG: 10 TABLET ORAL at 09:06

## 2019-04-14 RX ADMIN — OXYCODONE HYDROCHLORIDE 10 MG: 5 TABLET ORAL at 13:33

## 2019-04-14 RX ADMIN — TIZANIDINE 2 MG: 4 TABLET ORAL at 09:07

## 2019-04-14 RX ADMIN — BACLOFEN 20 MG: 10 TABLET ORAL at 13:33

## 2019-04-14 RX ADMIN — Medication 3 MG: at 20:52

## 2019-04-14 RX ADMIN — GABAPENTIN 200 MG: 250 SOLUTION ORAL at 00:08

## 2019-04-14 RX ADMIN — FOLIC ACID 1 MG: 1 TABLET ORAL at 09:07

## 2019-04-14 RX ADMIN — Medication 1000 MG: at 09:09

## 2019-04-14 RX ADMIN — OXYCODONE HYDROCHLORIDE 10 MG: 5 TABLET ORAL at 17:41

## 2019-04-14 RX ADMIN — TIZANIDINE 2 MG: 4 TABLET ORAL at 13:33

## 2019-04-14 RX ADMIN — AMANTADINE HYDROCHLORIDE 100 MG: 50 SOLUTION ORAL at 09:08

## 2019-04-14 RX ADMIN — FLUOXETINE HYDROCHLORIDE 20 MG: 20 SOLUTION ORAL at 09:08

## 2019-04-14 RX ADMIN — Medication 10 ML: at 10:30

## 2019-04-14 RX ADMIN — ATORVASTATIN CALCIUM 80 MG: 80 TABLET, FILM COATED ORAL at 20:52

## 2019-04-14 RX ADMIN — PANTOPRAZOLE SODIUM 40 MG: 40 GRANULE, DELAYED RELEASE ORAL at 09:07

## 2019-04-14 RX ADMIN — LACOSAMIDE 100 MG: 50 TABLET, FILM COATED ORAL at 09:18

## 2019-04-14 RX ADMIN — BACLOFEN 20 MG: 10 TABLET ORAL at 20:52

## 2019-04-14 RX ADMIN — OLANZAPINE 2.5 MG: 5 TABLET, FILM COATED ORAL at 09:06

## 2019-04-14 RX ADMIN — QUETIAPINE FUMARATE 25 MG: 25 TABLET ORAL at 20:52

## 2019-04-14 RX ADMIN — TIZANIDINE 2 MG: 4 TABLET ORAL at 20:52

## 2019-04-14 RX ADMIN — Medication 10 ML: at 20:53

## 2019-04-14 RX ADMIN — APIXABAN 5 MG: 5 TABLET, FILM COATED ORAL at 09:09

## 2019-04-14 RX ADMIN — ASPIRIN 81 MG 81 MG: 81 TABLET ORAL at 09:07

## 2019-04-14 ASSESSMENT — PAIN SCALES - GENERAL
PAINLEVEL_OUTOF10: 0
PAINLEVEL_OUTOF10: 10
PAINLEVEL_OUTOF10: 8
PAINLEVEL_OUTOF10: 7
PAINLEVEL_OUTOF10: 10
PAINLEVEL_OUTOF10: 5
PAINLEVEL_OUTOF10: 10

## 2019-04-14 NOTE — PROGRESS NOTES
<HTML><META HTTP-EQUIV=\"content-type\" CONTENT=\"text/html;charset=utf-8\"><UL class=\"list-unstyled ps-msg-right-text\"><LI>[phone] Hospital or Facility: [facility] From: James Moncada RE: Kristopher Keith 1965 RM: [id number] Can we move this patient off the floor to a different unit. She is on room air now and only needs the swallow eval now. Does this patient need progressive care floor can she move to another unit? please review and advise and let me know. Thank you Inezs Coral Need Callback: NEEDS CALLBACK C4 PROGRESSIVE CARE</LI></UL><p ng-if=\"ctrl. conversation(). ConversationType !== 'TeamAlertSolo'\">  <p class=\"ps-msg-unread\" ng-if=\"message. isRead == false &amp;&amp; message. isSent == true\">Unread</DIV></DIV>

## 2019-04-14 NOTE — PROGRESS NOTES
Hospitalist Progress Note      PCP: Iwona Martinez    Date of Admission: 4/6/2019    Chief Complaint: lethargy    Hospital Course: 53 y. o. female with PMHx of CVA w/ R hemiparesis, seizure disorder, SVT, HTN, Chronic Hematuria with prior Bladder Cancer, chronic randle, and Vasculopathy; working diagnosis of Giant Cell Arthritis vs. Atypical Takayasu. She has had multiple prolonged hospitalizations at  over the past few months. She was previously found to have numerous thrombosed vessels in her brain as well as in her subclavian and infrarenal AAA. 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 Followed by [facility] Rhematology and currently on chronic Prednisone and Eliquis. She was most recently discharged from [facility] on 3/23/19 after treatment for Sepsis, to [facility]. She presented to the ED on 4/6/19 with concerns for increased lethargy and diaphoresis. During ED evaluation, she was found to be tachycardic, which was controlled with cardizem gtt. Also during ED evaluation, she had witnessed seizure activity, which prompted transfer to [facility]. Subjective:   Ongoing pains and again did not work with therapy today. Per notes, did not work with SLP yesterday either.       Medications:  Reviewed    Infusion Medications    dextrose       Scheduled Medications    CENTRUM/CERTA-YANNICK with minerals oral  15 mL Oral Daily    apixaban  5 mg Oral BID    iron sucrose (VENOFER) iv piggyback 100 mL (Admin over 60 minutes)  100 mg Intravenous Q24H    sodium chloride  250 mL Intravenous Once    sulfamethoxazole-trimethoprim  1 tablet PEG Tube Once per day on Mon Wed Fri    OLANZapine  2.5 mg PEG Tube Q12H    sodium chloride flush  10 mL Intravenous 2 times per day    amantadine  100 mg Per G Tube Q12H    aspirin  81 mg Per G Tube appropriately. Capillary Refill: Brisk,< 3 seconds   Peripheral Pulses: +2 palpable, equal bilaterally     Labs:   Recent Labs     04/12/19 0438 04/13/19 0434 04/14/19 0429   WBC 10.0 13.3* 13.1*   HGB 9.9* 10.3* 10.7*   HCT 32.0* 33.9* 34.9*    331 347     Recent Labs     04/12/19 0438 04/13/19 0434 04/14/19 0429    146* 143   K 3.6 3.5 4.4    108 104   CO2 28 28 29   BUN 13 19 21*   CREATININE <0.5* 0.6 <0.5*   CALCIUM 8.4 8.9 8.9     No results for input(s): AST, ALT, BILIDIR, BILITOT, ALKPHOS in the last 72 hours. No results for input(s): INR in the last 72 hours. No results for input(s): Delcie Arbon in the last 72 hours. Urinalysis:      Lab Results   Component Value Date    NITRU Negative 04/06/2019    WBCUA >100 04/06/2019    BACTERIA 4+ 04/06/2019    RBCUA see below 04/06/2019    BLOODU LARGE 04/06/2019    SPECGRAV 1.025 04/06/2019    GLUCOSEU Negative 04/06/2019       Radiology:  XR CHEST PORTABLE   Final Result   No acute disease. CT HEAD WO CONTRAST   Final Result   No acute intracranial abnormality. IR ULTRASOUND GUIDANCE VASCULAR ACCESS   Final Result   Unsuccessful attempt at placement of left upper extremity PICC line due to   lack of visualization of vessel of sufficient size to facilitate placement of   PICC line as described. VL Extremity Venous Right   Final Result            Assessment/Plan:    Severe sepsis, present on arrival. Possible UTI vs pneumonia  - marked tachycardia, leukocytosis on presentation  - lactate 3.3 initially, improved to 2.2 with IVF  - Clinically improving, with no further leukocytosis, afebrile, improved mentation  - CT abd/pel without source of infection noted. - Completed empiric 7 day course of Vanc/Zosyn. Seizure disorder  - last seizure while in ED.   - EEG shows diffuse encephalopathy.    - continue vimpat, keppra, gabapentin  - neurology followed earlier in stay    Acute hypoxic respiratory failure 2/2 above  - Wean O2 as tolerated. - nebs PRN ordered    Acute multifactorial encephalopathy with baseline vascular dementia  - sepsis vs post-ictal  - Mentation improved and near baseline    Anemia: Progressive anemia with Hb decline from 9.4--> 6.9 since presentation. Anemia is chronic, with recent baseline around 8 at [facility]. Has had chronic hematuria requiring cysto/ablation recently. No current gross hematuria. No obvious GI bleeding. Has large sacral ulcer which could be responsible for slow blood loss as well. Occult Blood negative. Hb improved with transfusion of 1 unit PRBC. Fe studies low; Cont IV Venofer daily x 3 days     Tachycardia: Hx of same. Unclear if SVT or severe sinus tachycardia at present. Resolved. Cardiology consulted and now signed off. Unspecified Vasculitis/Vasculopathy: She was previously found to have numerous thrombosed vessels in her brain as well as in her subclavian and infrarenal AAA. 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 Followed by  Rhematology and currently on chronic Eliquis and Prednisone with Bactrim prophylaxis. - Continue steroids, ASA, statin. - Eliquis on hold 4/10 given progressive anemia; now back on     Right Upper Extremity SVT: Likely 2/2 PIV. Already on [facility]. Continue Eliquis. Local care. Dysphagia  - continue nightly TF via PEG, tolerating well. Needs to work more with SLP, hopefully will tomorrow. If not may need to consider 24/7 TF pending PO intake resumption    Sacral Decubitus Ulcer: Stage 4 ulcer, currently under treatment by  Wound Care. Required multiple surgical debridements during hospitalization at [facility] 3/2019. Wound care consulted here. Continue local wound care measures, offloading, and prompt incontinence care.    -needs better pain control in effort for her to be able to work with PT, OT.   -Def needs rehabilitation, cont inc prn Oxycodone dose, cont prn IV Dilaudid. Hope to retry therapy in am    SW following, as needs therapy to gain ability to go skilled. DVT Prophylaxis: Eliquis  Diet: DIET GENERAL; Dysphagia II Mechanically Altered  Diet Tube Feed Modular: Protein Modular  Dietary Nutrition Supplements: Frozen Oral Supplement  Diet Tube Feed Continuous/Cyclic w/ Diet  Code Status: Full Code    PT/OT Eval Status: Ongoing    Dispo - Likely ready for DC soon as Hb stable and Abx completed. Ideally she should return to SNF/ECF for ongoing recovery from her multiple recent hospitalizations.  Inc pain regimen, retry PT, OT this weekend, and into next week    Liane Figueroa MD

## 2019-04-14 NOTE — PROGRESS NOTES
Physical Therapy    Chart reviewed; Pt seen for initial PT eval on 4/12/2019 with recommendations of ECF with PT. No significant change in functional status noted since then. Continue to recommend ECF with PT, signing off on PT orders at this time.     Arnulfo Copeland, PT, DPT #[id number]

## 2019-04-15 LAB
ANION GAP SERPL CALCULATED.3IONS-SCNC: 12 MMOL/L (ref 3–16)
BUN BLDV-MCNC: 21 MG/DL (ref 7–20)
CALCIUM SERPL-MCNC: 9.1 MG/DL (ref 8.3–10.6)
CHLORIDE BLD-SCNC: 101 MMOL/L (ref 99–110)
CO2: 28 MMOL/L (ref 21–32)
CREAT SERPL-MCNC: <0.5 MG/DL (ref 0.6–1.1)
GFR AFRICAN AMERICAN: >60
GFR NON-AFRICAN AMERICAN: >60
GLUCOSE BLD-MCNC: 102 MG/DL (ref 70–99)
GLUCOSE BLD-MCNC: 113 MG/DL (ref 70–99)
GLUCOSE BLD-MCNC: 160 MG/DL (ref 70–99)
GLUCOSE BLD-MCNC: 90 MG/DL (ref 70–99)
GLUCOSE BLD-MCNC: 99 MG/DL (ref 70–99)
HCT VFR BLD CALC: 35.3 % (ref 36–48)
HEMOGLOBIN: 10.8 G/DL (ref 12–16)
MCH RBC QN AUTO: 23.3 PG (ref 26–34)
MCHC RBC AUTO-ENTMCNC: 30.7 G/DL (ref 31–36)
MCV RBC AUTO: 76 FL (ref 80–100)
PDW BLD-RTO: 22.2 % (ref 12.4–15.4)
PERFORMED ON: ABNORMAL
PERFORMED ON: NORMAL
PLATELET # BLD: 338 K/UL (ref 135–450)
PMV BLD AUTO: 8.5 FL (ref 5–10.5)
POTASSIUM REFLEX MAGNESIUM: 3.6 MMOL/L (ref 3.5–5.1)
RBC # BLD: 4.64 M/UL (ref 4–5.2)
SODIUM BLD-SCNC: 141 MMOL/L (ref 136–145)
WBC # BLD: 13.6 K/UL (ref 4–11)

## 2019-04-15 PROCEDURE — 2580000003 HC RX 258: Performed by: INTERNAL MEDICINE

## 2019-04-15 PROCEDURE — 6360000002 HC RX W HCPCS: Performed by: INTERNAL MEDICINE

## 2019-04-15 PROCEDURE — 97110 THERAPEUTIC EXERCISES: CPT

## 2019-04-15 PROCEDURE — 36415 COLL VENOUS BLD VENIPUNCTURE: CPT

## 2019-04-15 PROCEDURE — 6370000000 HC RX 637 (ALT 250 FOR IP): Performed by: FAMILY MEDICINE

## 2019-04-15 PROCEDURE — 6360000002 HC RX W HCPCS: Performed by: FAMILY MEDICINE

## 2019-04-15 PROCEDURE — 2060000000 HC ICU INTERMEDIATE R&B

## 2019-04-15 PROCEDURE — 80048 BASIC METABOLIC PNL TOTAL CA: CPT

## 2019-04-15 PROCEDURE — 6370000000 HC RX 637 (ALT 250 FOR IP): Performed by: HOSPITALIST

## 2019-04-15 PROCEDURE — 6370000000 HC RX 637 (ALT 250 FOR IP): Performed by: INTERNAL MEDICINE

## 2019-04-15 PROCEDURE — 85027 COMPLETE CBC AUTOMATED: CPT

## 2019-04-15 RX ADMIN — IRON SUCROSE 100 MG: 20 INJECTION, SOLUTION INTRAVENOUS at 08:51

## 2019-04-15 RX ADMIN — Medication 10 ML: at 23:39

## 2019-04-15 RX ADMIN — TIZANIDINE 2 MG: 4 TABLET ORAL at 08:36

## 2019-04-15 RX ADMIN — FOLIC ACID 1 MG: 1 TABLET ORAL at 08:40

## 2019-04-15 RX ADMIN — FLUOXETINE HYDROCHLORIDE 20 MG: 20 SOLUTION ORAL at 08:45

## 2019-04-15 RX ADMIN — Medication 15 ML: at 08:46

## 2019-04-15 RX ADMIN — TIZANIDINE 2 MG: 4 TABLET ORAL at 14:10

## 2019-04-15 RX ADMIN — HYDROMORPHONE HYDROCHLORIDE 1 MG: 2 INJECTION INTRAMUSCULAR; INTRAVENOUS; SUBCUTANEOUS at 18:55

## 2019-04-15 RX ADMIN — BACLOFEN 20 MG: 10 TABLET ORAL at 08:42

## 2019-04-15 RX ADMIN — HYDROMORPHONE HYDROCHLORIDE 1 MG: 2 INJECTION INTRAMUSCULAR; INTRAVENOUS; SUBCUTANEOUS at 04:39

## 2019-04-15 RX ADMIN — OXYCODONE HYDROCHLORIDE AND ACETAMINOPHEN 500 MG: 500 TABLET ORAL at 20:35

## 2019-04-15 RX ADMIN — Medication 3 MG: at 20:26

## 2019-04-15 RX ADMIN — BACLOFEN 20 MG: 10 TABLET ORAL at 20:25

## 2019-04-15 RX ADMIN — HYDROMORPHONE HYDROCHLORIDE 1 MG: 2 INJECTION INTRAMUSCULAR; INTRAVENOUS; SUBCUTANEOUS at 14:09

## 2019-04-15 RX ADMIN — ATORVASTATIN CALCIUM 80 MG: 80 TABLET, FILM COATED ORAL at 20:25

## 2019-04-15 RX ADMIN — APIXABAN 5 MG: 5 TABLET, FILM COATED ORAL at 08:36

## 2019-04-15 RX ADMIN — ASPIRIN 81 MG 81 MG: 81 TABLET ORAL at 08:43

## 2019-04-15 RX ADMIN — OXYCODONE HYDROCHLORIDE AND ACETAMINOPHEN 500 MG: 500 TABLET ORAL at 08:44

## 2019-04-15 RX ADMIN — OLANZAPINE 2.5 MG: 5 TABLET, FILM COATED ORAL at 08:40

## 2019-04-15 RX ADMIN — PREDNISONE 30 MG: 10 TABLET ORAL at 08:42

## 2019-04-15 RX ADMIN — AMANTADINE HYDROCHLORIDE 100 MG: 50 SOLUTION ORAL at 08:45

## 2019-04-15 RX ADMIN — GABAPENTIN 200 MG: 250 SOLUTION ORAL at 21:37

## 2019-04-15 RX ADMIN — PANTOPRAZOLE SODIUM 40 MG: 40 GRANULE, DELAYED RELEASE ORAL at 08:41

## 2019-04-15 RX ADMIN — GABAPENTIN 200 MG: 250 SOLUTION ORAL at 14:44

## 2019-04-15 RX ADMIN — LACOSAMIDE 100 MG: 50 TABLET, FILM COATED ORAL at 10:08

## 2019-04-15 RX ADMIN — LACOSAMIDE 100 MG: 50 TABLET, FILM COATED ORAL at 20:25

## 2019-04-15 RX ADMIN — Medication 1000 MG: at 20:26

## 2019-04-15 RX ADMIN — GABAPENTIN 200 MG: 250 SOLUTION ORAL at 08:45

## 2019-04-15 RX ADMIN — Medication 10 ML: at 08:37

## 2019-04-15 RX ADMIN — OLANZAPINE 2.5 MG: 5 TABLET, FILM COATED ORAL at 21:37

## 2019-04-15 RX ADMIN — TIZANIDINE 2 MG: 4 TABLET ORAL at 20:35

## 2019-04-15 RX ADMIN — QUETIAPINE FUMARATE 25 MG: 25 TABLET ORAL at 20:26

## 2019-04-15 RX ADMIN — Medication 1000 MG: at 08:45

## 2019-04-15 RX ADMIN — HYDROMORPHONE HYDROCHLORIDE 1 MG: 2 INJECTION INTRAMUSCULAR; INTRAVENOUS; SUBCUTANEOUS at 08:39

## 2019-04-15 RX ADMIN — HYDROMORPHONE HYDROCHLORIDE 1 MG: 2 INJECTION INTRAMUSCULAR; INTRAVENOUS; SUBCUTANEOUS at 23:38

## 2019-04-15 RX ADMIN — BACLOFEN 20 MG: 10 TABLET ORAL at 14:10

## 2019-04-15 RX ADMIN — OXYCODONE HYDROCHLORIDE 10 MG: 5 TABLET ORAL at 06:33

## 2019-04-15 RX ADMIN — SULFAMETHOXAZOLE AND TRIMETHOPRIM 1 TABLET: 800; 160 TABLET ORAL at 08:41

## 2019-04-15 RX ADMIN — APIXABAN 5 MG: 5 TABLET, FILM COATED ORAL at 20:25

## 2019-04-15 ASSESSMENT — PAIN SCALES - GENERAL
PAINLEVEL_OUTOF10: 7
PAINLEVEL_OUTOF10: 7
PAINLEVEL_OUTOF10: 10
PAINLEVEL_OUTOF10: 7
PAINLEVEL_OUTOF10: 10
PAINLEVEL_OUTOF10: 9
PAINLEVEL_OUTOF10: 5
PAINLEVEL_OUTOF10: 6

## 2019-04-15 NOTE — PROGRESS NOTES
Occupational Therapy  Facility/Department: [facility]  Daily Treatment Note  NAME: Sparkle Worthington  : 1965  MRN: [id number]    Date of Service: 4/15/2019    Discharge Recommendations:  ECF with OT  OT Equipment Recommendations  Equipment Needed: No  Other: defer to next level of care    Assessment   Performance deficits / Impairments: Decreased functional mobility ; Decreased cognition;Decreased ADL status; Decreased endurance;Decreased fine motor control;Decreased ROM; Decreased coordination;Decreased strength;Decreased vision/visual deficit  Assessment: Pt admitted to ED from [facility] with c/o lethargy, found tachycardic. Pt unable to provide accurate PLOF information, unclear how pt was functioning prior to SNF stay. Per CM notes, pt was participating in UE PROM contracture management program at [facility]. Pt now presents total A for all ADLs, tolerating little UE ROM. Pt would benefit from continued OT to reduce contractures and participate in bed level tasks. At this time AMPAC score indicates non homebound discharge. Progression with therapy however is guarded based on assessment and pts increased pain with little movement. Will continue to assess dc recommendations pending pt progress. Treatment Diagnosis: decreased ADLs and ROM  Prognosis: Guarded  REQUIRES OT FOLLOW UP: Yes  Activity Tolerance  Activity Tolerance: Patient limited by pain;Treatment limited secondary to decreased cognition;Treatment limited secondary to medical complications (free text)  Safety Devices  Safety Devices in place: Yes  Type of devices: Left in bed;Bed alarm in place;Call light within reach;Nurse notified       Patient Diagnosis(es): There were no encounter diagnoses.       has a past medical history of Anorexia, Arthritis, Bulging lumbar disc, C. difficile diarrhea, Cancer ([address] Cerebral artery occlusion with cerebral infarction ([facility] [address] Chronic kidney disease, Hypertriglyceridemia, Post traumatic stress disorder, Seasonal allergies, Seizure disorder ([facility] and UTI (urinary tract infection). has a past surgical history that includes Tubal ligation; Cystoscopy; Cystoscopy (6-13-13); Bladder surgery; and Cystoscopy (11-). Restrictions  Restrictions/Precautions  Restrictions/Precautions: General Precautions  Position Activity Restriction  Other position/activity restrictions: up with assistance; SZD     Subjective   General  Chart Reviewed: Yes  Patient assessed for rehabilitation services?: Yes  Family / Caregiver Present: No  Referring Practitioner: Reno Pop MD  Diagnosis: SVT  Subjective  Subjective: Pt supine in bed upon arrival, agreeable to participate, pleasantly confused  General Comment  Comments: RN cleared pt to participate in therapy. Vital Signs  Patient Currently in Pain: Yes(at randle insertion site; line re-adjusted to prevent pulling; RN aware and pt satisfied)     Orientation  Orientation  Overall Orientation Status: Impaired  Orientation Level: Oriented to person;Disoriented to place; Disoriented to time;Disoriented to situation     Objective        Cognition  Overall Cognitive Status: Exceptions  Arousal/Alertness: Delayed responses to stimuli;Inconsistent responses to stimuli  Following Commands: Follows one step commands with increased time; Follows one step commands with repetition  Attention Span: Attends with cues to redirect  Memory: Decreased recall of recent events;Decreased short term memory  Safety Judgement: Decreased awareness of need for safety;Decreased awareness of need for assistance  Problem Solving: Decreased awareness of errors  Insights: Decreased awareness of deficits  Initiation: Requires cues for all  Sequencing: Requires cues for all                       Type of ROM/Therapeutic Exercise  Type of ROM/Therapeutic Exercise: PROM;AAROM  Comment: BUE in supine  Exercises  Shoulder Flexion: 10x, AAROM  Shoulder Extension: 10x, AAROM  Elbow Flexion: 10x, PROM with hold at end range ext, gross range () - limited by contracture  Elbow Extension: 10x, PROM with hold at end range ext, gross range () - limited by contracture  Supination: 10x, PROM  Pronation: 10x, PROM  Wrist Flexion: 10x, PROM (extension to neutral), with hold at end range ext - limited by contracture  Wrist Extension: 10x, PROM (extension to neutral), with hold at end range ext - limited by contracture  Grasp/Release: 10x, PROM; washclothes placed in palms to protect skin integrity as pt's passive positioning at rest is flexion and nails digging into palm; PCA educated and stated understanding  Other: Shoulder IR/ER - 10 reps AAROM, neck rotation and flex/ext 10x each                   Education: Role of OT, safe t/f training, safe use of DME, awareness of deficits, discharge planning, ADL as therapeutic exercise, importance of OOB, cognitive re-orientation, therex    Plan   Plan  Times per week: 2-4  Times per day: Daily  Current Treatment Recommendations: ROM, Self-Care / ADL, Positioning           AM-PAC Score        AM-[facility] Inpatient Daily Activity Raw Score: 6  AM-PAC Inpatient ADL T-Scale Score : 17.07  ADL Inpatient CMS 0-100% Score: 100  ADL Inpatient CMS G-Code Modifier : CN    Goals  Short term goals  Time Frame for Short term goals: by 4/18 unless otherwise stated  Short term goal 1: pt will tolerate B UE PROM   Short term goal 2: Pt will tolerate bed mobilty with max A x 2 by 4/15  Patient Goals   Patient goals : pt unable to state achievable goal during session       Therapy Time   Individual Concurrent Group Co-treatment   Time In 1105         Time Out 1130         Minutes 25         Timed Code Treatment Minutes: 25 Minutes       If patient is discharged prior to next treatment session, this note will serve as the discharge summary.   Chanelle Patterson, OTR/L #[id number]

## 2019-04-16 VITALS
DIASTOLIC BLOOD PRESSURE: 88 MMHG | SYSTOLIC BLOOD PRESSURE: 131 MMHG | WEIGHT: 101.3 LBS | OXYGEN SATURATION: 94 % | HEIGHT: 62 IN | BODY MASS INDEX: 18.64 KG/M2 | HEART RATE: 100 BPM | RESPIRATION RATE: 18 BRPM | TEMPERATURE: 98.2 F

## 2019-04-16 LAB
ANION GAP SERPL CALCULATED.3IONS-SCNC: 10 MMOL/L (ref 3–16)
BUN BLDV-MCNC: 19 MG/DL (ref 7–20)
CALCIUM SERPL-MCNC: 9.1 MG/DL (ref 8.3–10.6)
CHLORIDE BLD-SCNC: 100 MMOL/L (ref 99–110)
CO2: 29 MMOL/L (ref 21–32)
CREAT SERPL-MCNC: <0.5 MG/DL (ref 0.6–1.1)
GFR AFRICAN AMERICAN: >60
GFR NON-AFRICAN AMERICAN: >60
GLUCOSE BLD-MCNC: 112 MG/DL (ref 70–99)
GLUCOSE BLD-MCNC: 119 MG/DL (ref 70–99)
GLUCOSE BLD-MCNC: 129 MG/DL (ref 70–99)
HCT VFR BLD CALC: 34.8 % (ref 36–48)
HEMOGLOBIN: 10.8 G/DL (ref 12–16)
MAGNESIUM: 1.9 MG/DL (ref 1.8–2.4)
MCH RBC QN AUTO: 23.7 PG (ref 26–34)
MCHC RBC AUTO-ENTMCNC: 31.1 G/DL (ref 31–36)
MCV RBC AUTO: 76.1 FL (ref 80–100)
PDW BLD-RTO: 22.7 % (ref 12.4–15.4)
PERFORMED ON: ABNORMAL
PERFORMED ON: ABNORMAL
PLATELET # BLD: 356 K/UL (ref 135–450)
PMV BLD AUTO: 8.4 FL (ref 5–10.5)
POTASSIUM REFLEX MAGNESIUM: 3.4 MMOL/L (ref 3.5–5.1)
RBC # BLD: 4.58 M/UL (ref 4–5.2)
SODIUM BLD-SCNC: 139 MMOL/L (ref 136–145)
WBC # BLD: 12.7 K/UL (ref 4–11)

## 2019-04-16 PROCEDURE — 94761 N-INVAS EAR/PLS OXIMETRY MLT: CPT

## 2019-04-16 PROCEDURE — 36415 COLL VENOUS BLD VENIPUNCTURE: CPT

## 2019-04-16 PROCEDURE — 85027 COMPLETE CBC AUTOMATED: CPT

## 2019-04-16 PROCEDURE — 80048 BASIC METABOLIC PNL TOTAL CA: CPT

## 2019-04-16 PROCEDURE — 92526 ORAL FUNCTION THERAPY: CPT

## 2019-04-16 PROCEDURE — 2700000000 HC OXYGEN THERAPY PER DAY

## 2019-04-16 PROCEDURE — 2580000003 HC RX 258: Performed by: INTERNAL MEDICINE

## 2019-04-16 PROCEDURE — 6370000000 HC RX 637 (ALT 250 FOR IP): Performed by: FAMILY MEDICINE

## 2019-04-16 PROCEDURE — 6370000000 HC RX 637 (ALT 250 FOR IP): Performed by: INTERNAL MEDICINE

## 2019-04-16 PROCEDURE — 6370000000 HC RX 637 (ALT 250 FOR IP): Performed by: HOSPITALIST

## 2019-04-16 PROCEDURE — 6360000002 HC RX W HCPCS: Performed by: FAMILY MEDICINE

## 2019-04-16 PROCEDURE — 83735 ASSAY OF MAGNESIUM: CPT

## 2019-04-16 RX ORDER — SULFAMETHOXAZOLE AND TRIMETHOPRIM 800; 160 MG/1; MG/1
1 TABLET ORAL
Refills: 0 | DISCHARGE
Start: 2019-04-17 | End: 2019-05-17

## 2019-04-16 RX ORDER — GABAPENTIN 250 MG/5ML
200 SOLUTION ORAL EVERY 8 HOURS
Qty: 473 ML | Refills: 0 | Status: SHIPPED | OUTPATIENT
Start: 2019-04-16 | End: 2019-04-27

## 2019-04-16 RX ORDER — OXYCODONE HYDROCHLORIDE 10 MG/1
10 TABLET ORAL EVERY 4 HOURS PRN
Qty: 12 TABLET | Refills: 0 | Status: SHIPPED | OUTPATIENT
Start: 2019-04-16 | End: 2019-04-23

## 2019-04-16 RX ORDER — IPRATROPIUM BROMIDE AND ALBUTEROL SULFATE 2.5; .5 MG/3ML; MG/3ML
1 SOLUTION RESPIRATORY (INHALATION) EVERY 4 HOURS PRN
Qty: 360 ML | DISCHARGE
Start: 2019-04-16

## 2019-04-16 RX ORDER — UREA 10 %
3 LOTION (ML) TOPICAL NIGHTLY
DISCHARGE
Start: 2019-04-16 | End: 2019-04-27

## 2019-04-16 RX ORDER — SENNA AND DOCUSATE SODIUM 50; 8.6 MG/1; MG/1
1 TABLET, FILM COATED ORAL 2 TIMES DAILY PRN
DISCHARGE
Start: 2019-04-16

## 2019-04-16 RX ORDER — BACLOFEN 20 MG/1
20 TABLET ORAL 3 TIMES DAILY
DISCHARGE
Start: 2019-04-16

## 2019-04-16 RX ADMIN — HYDROMORPHONE HYDROCHLORIDE 1 MG: 2 INJECTION INTRAMUSCULAR; INTRAVENOUS; SUBCUTANEOUS at 04:35

## 2019-04-16 RX ADMIN — BACLOFEN 20 MG: 10 TABLET ORAL at 08:47

## 2019-04-16 RX ADMIN — TIZANIDINE 2 MG: 4 TABLET ORAL at 14:10

## 2019-04-16 RX ADMIN — Medication 10 ML: at 08:49

## 2019-04-16 RX ADMIN — PREDNISONE 30 MG: 10 TABLET ORAL at 08:42

## 2019-04-16 RX ADMIN — BACLOFEN 20 MG: 10 TABLET ORAL at 14:10

## 2019-04-16 RX ADMIN — GABAPENTIN 200 MG: 250 SOLUTION ORAL at 08:43

## 2019-04-16 RX ADMIN — GABAPENTIN 200 MG: 250 SOLUTION ORAL at 14:10

## 2019-04-16 RX ADMIN — TIZANIDINE 2 MG: 4 TABLET ORAL at 08:45

## 2019-04-16 RX ADMIN — HYDROMORPHONE HYDROCHLORIDE 1 MG: 2 INJECTION INTRAMUSCULAR; INTRAVENOUS; SUBCUTANEOUS at 08:37

## 2019-04-16 RX ADMIN — HYDROMORPHONE HYDROCHLORIDE 1 MG: 2 INJECTION INTRAMUSCULAR; INTRAVENOUS; SUBCUTANEOUS at 14:09

## 2019-04-16 RX ADMIN — OXYCODONE HYDROCHLORIDE 10 MG: 5 TABLET ORAL at 06:44

## 2019-04-16 RX ADMIN — Medication 1000 MG: at 08:44

## 2019-04-16 RX ADMIN — AMANTADINE HYDROCHLORIDE 100 MG: 50 SOLUTION ORAL at 08:44

## 2019-04-16 RX ADMIN — Medication 15 ML: at 08:44

## 2019-04-16 RX ADMIN — OXYCODONE HYDROCHLORIDE AND ACETAMINOPHEN 500 MG: 500 TABLET ORAL at 08:44

## 2019-04-16 RX ADMIN — OLANZAPINE 2.5 MG: 5 TABLET, FILM COATED ORAL at 08:42

## 2019-04-16 RX ADMIN — FLUOXETINE HYDROCHLORIDE 20 MG: 20 SOLUTION ORAL at 08:43

## 2019-04-16 RX ADMIN — ASPIRIN 81 MG 81 MG: 81 TABLET ORAL at 08:45

## 2019-04-16 RX ADMIN — PANTOPRAZOLE SODIUM 40 MG: 40 GRANULE, DELAYED RELEASE ORAL at 08:48

## 2019-04-16 RX ADMIN — LACOSAMIDE 100 MG: 50 TABLET, FILM COATED ORAL at 08:46

## 2019-04-16 RX ADMIN — APIXABAN 5 MG: 5 TABLET, FILM COATED ORAL at 08:47

## 2019-04-16 RX ADMIN — FOLIC ACID 1 MG: 1 TABLET ORAL at 08:48

## 2019-04-16 ASSESSMENT — PAIN SCALES - GENERAL
PAINLEVEL_OUTOF10: 8
PAINLEVEL_OUTOF10: 7
PAINLEVEL_OUTOF10: 10
PAINLEVEL_OUTOF10: 7
PAINLEVEL_OUTOF10: 8

## 2019-04-16 NOTE — PROGRESS NOTES
Speech Language Pathology  Facility/Department: [facility]  Dysphagia Daily Treatment Note    NAME: Austin Rosales  : 1965  MRN: [id number]    Patient Diagnosis(es):   Patient Active Problem List    Diagnosis Date Noted    Chronic distal aortic occlusion ([facility] 2019    Severe sepsis ([facility] 2019    UTI (urinary tract infection) 2019    Seizure disorder ([facility] 2019    Encephalopathy, metabolic     Remote history of stroke     Decreased thyroid stimulating hormone (TSH) level 10/05/2017    Hypertriglyceridemia 10/05/2017    Episodic paroxysmal anxiety disorder 10/05/2017    Cerebral artery occlusion with cerebral infarction ([facility] 10/05/2017    Anorexia 10/05/2017    Arthritis 10/05/2017    Brachial-basilar insufficiency syndrome 2017    Chronic low back pain 2016     Allergies: Allergies   Allergen Reactions    Acetaminophen     Ativan [Lorazepam] Swelling    Codeine Hives    Darvocet [Propoxyphene N-Acetaminophen]     Methadone     Ultram [Tramadol] Swelling    Lodine [Etodolac] Rash     Onset Date: admit date 19  Current Diet Level:  NPO with PEG in place    Pain:  No pain indicated    Recommend: N/A- pt is NPO    P.O. Trials: Thin x x4 teaspoons juice  x3 cup sips of juice  x3 sips juice via straw   Puree x x3 bites of applesauce   Soft solid   x x2 bites of softened yahaira cracker with applesauce     Impressions:  Pt laying in bed upon SLP arrival, RN ok'd SLP entry into room. Pt was downgraded to NPO on  as pt would not allow SLP to sit pt upright in order to safely consume PO trials. Speech therapy then re-attempted to see pt on 04/15 and pt also refused to be sat upright again. Pt cooperative and agreeable to participate with SLP. Pt allowed SLP to sit pt fully upright in bed. Pt currently on RA, O2 sats stable at 96% prior to PO trials. Pt with upper dentures in place, no dentition on lower ridge.   Pt currently with PEG tube discharges from hospital prior to Speech/Swallowing discharge, this note serves as tx and discharge summary. Total Treatment Time:  Dysphagia Tx 18\" (0437-1377)    Yolanda EDUARDO CCC-SLP  Speech-Language Pathologist  [id number]

## 2019-04-16 NOTE — CARE COORDINATION
4/16/19 [facility] will accept this pt under her Medicaid non-skilled. Pt and her daughter agree with this plan. Transportation set up for 4 pm, pending d/c orders.

## 2019-04-16 NOTE — PROGRESS NOTES
pantoprazole sodium  40 mg Per G Tube Daily    FLUoxetine  20 mg Per G Tube Daily    folic acid  1 mg Per G Tube Daily    gabapentin  200 mg Per G Tube Q8H    lacosamide  100 mg Per G Tube BID    levETIRAcetam  1,000 mg Per G Tube BID    melatonin  3 mg Per G Tube Nightly    predniSONE  30 mg Per G Tube Daily    QUEtiapine  25 mg Per G Tube Nightly    tiZANidine  2 mg Per G Tube TID    vitamin C  500 mg Per G Tube BID     PRN Meds: oxyCODONE, sennosides-docusate sodium, hydrALAZINE, magnesium hydroxide, sodium chloride flush, ondansetron, HYDROmorphone, LORazepam, ipratropium-albuterol, glucose, dextrose, glucagon (rDNA), dextrose      Intake/Output Summary (Last 24 hours) at 4/15/2019 2038  Last data filed at 4/15/2019 1418  Gross per 24 hour   Intake 840 ml   Output 1950 ml   Net -1110 ml       Physical Exam Performed:    /82   Pulse 98   Temp 98.5 °F (36.9 °C) (Oral)   Resp 18   Ht 5' 2\" (1.575 m) Comment: per CareEverywhere  Wt 101 lb 4.8 oz (45.9 kg)   LMP 07/22/2013   SpO2 96%   Breastfeeding? No   BMI 18.53 kg/m²     General appearance: Awake, no signs of distress. HEENT:  Normal cephalic, atraumatic without obvious deformity. Pupils equal, round, and reactive to light. Extra ocular muscles intact. Conjunctivae/corneas clear. Neck: Supple, with full range of motion. No jugular venous distention. Trachea midline. Respiratory:  Normal respiratory effort. Clear to auscultation, bilaterally without Rales/Wheezes/Rhonchi. Cardiovascular:  Regular rate and rhythm with normal S1/S2 without murmurs, rubs or gallops. Abdomen: Soft, non-tender, non-distended with normal bowel sounds. Musculoskeletal:  No clubbing, cyanosis or edema bilaterally. Skin: decub ulcer   Neurologic: b/l UE weakness with contractures, no voluntary movement in legs   Psychiatric:  alert and oriented, answers questions appropriately.    Capillary Refill: Brisk,< 3 seconds   Peripheral Pulses: +2 palpable, equal bilaterally     Labs:   Recent Labs     04/13/19  0434 04/14/19  0429 04/15/19  0431   WBC 13.3* 13.1* 13.6*   HGB 10.3* 10.7* 10.8*   HCT 33.9* 34.9* 35.3*    347 338     Recent Labs     04/13/19 0434 04/14/19  0429 04/15/19  0431   * 143 141   K 3.5 4.4 3.6    104 101   CO2 28 29 28   BUN 19 21* 21*   CREATININE 0.6 <0.5* <0.5*   CALCIUM 8.9 8.9 9.1     No results for input(s): AST, ALT, BILIDIR, BILITOT, ALKPHOS in the last 72 hours. No results for input(s): INR in the last 72 hours. No results for input(s): Dareen Serge in the last 72 hours. Urinalysis:      Lab Results   Component Value Date    NITRU Negative 04/06/2019    WBCUA >100 04/06/2019    BACTERIA 4+ 04/06/2019    RBCUA see below 04/06/2019    BLOODU LARGE 04/06/2019    SPECGRAV 1.025 04/06/2019    GLUCOSEU Negative 04/06/2019       Radiology:  XR CHEST PORTABLE   Final Result   No acute disease. CT HEAD WO CONTRAST   Final Result   No acute intracranial abnormality. IR ULTRASOUND GUIDANCE VASCULAR ACCESS   Final Result   Unsuccessful attempt at placement of left upper extremity PICC line due to   lack of visualization of vessel of sufficient size to facilitate placement of   PICC line as described. VL Extremity Venous Right   Final Result            Assessment/Plan:    Severe sepsis, present on arrival. Possible UTI vs pneumonia  - marked tachycardia, leukocytosis on presentation  - lactate 3.3 initially, improved to 2.2 with IVF  - Clinically improving, with no further leukocytosis, afebrile, improved mentation  - CT abd/pel without source of infection noted. - Completed empiric 7 day course of Vanc/Zosyn. Seizure disorder  - last seizure while in ED.   - EEG shows diffuse encephalopathy. - continue vimpat, keppra, gabapentin  - neurology followed earlier in stay    Acute hypoxic respiratory failure 2/2 above  - Wean O2 as tolerated.    - nebs PRN ordered    Acute multifactorial encephalopathy with baseline vascular dementia  - sepsis vs post-ictal  - Mentation improved and near baseline    Anemia: Progressive anemia with Hb decline from 9.4--> 6.9 since presentation. Anemia is chronic, with recent baseline around 8 at [facility]. Has had chronic hematuria requiring cysto/ablation recently. No current gross hematuria. No obvious GI bleeding. Has large sacral ulcer which could be responsible for slow blood loss as well. Occult Blood negative. Hb improved with transfusion of 1 unit PRBC. Fe studies low; completed IV Venofer daily x 3 days. Tachycardia: Hx of same. Unclear if SVT or severe sinus tachycardia at present. Resolved. Cardiology consulted and now signed off. Unspecified Vasculitis/Vasculopathy: She was previously found to have numerous thrombosed vessels in her brain as well as in her subclavian and infrarenal AAA. 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 Followed by  Rhematology and currently on chronic Eliquis and Prednisone with Bactrim prophylaxis. - Continue steroids, ASA, statin. - Eliquis on hold 4/10 given progressive anemia; now back on     Right Upper Extremity SVT: Likely 2/2 PIV. Already on [facility]. Continue Eliquis. Local care. Dysphagia  - continue nightly TF via PEG, tolerating well. Needs to work more with SLP, hopefully will tomorrow. If not may need to consider 24/7 TF pending PO intake resumption    Sacral Decubitus Ulcer: Stage 4 ulcer, currently under treatment by  Wound Care. Required multiple surgical debridements during hospitalization at [facility] 3/2019. Wound care consulted here. Continue local wound care measures, offloading, and prompt incontinence care.    -needs better pain control in effort for her to be able to work with PT, OT.       DVT Prophylaxis: Eliquis  Diet: DIET TUBE FEED CONTINUOUS/CYCLIC NPO; 1.5 Calorie with Fiber; Gastrostomy (continuous overnight only ); Continuous; 65; 65  Dietary Nutrition Supplements: Protein Modular  Code Status: Full Code    PT/OT Eval Status: Ongoing    Dispo - Likely ready for DC. Ideally she should return to SNF/ECF for ongoing recovery from her multiple recent hospitalizations. Pain improved. Continue PT/OT as tolerated. Precert pending.      Pooja Montanez MD

## 2019-04-16 NOTE — PROGRESS NOTES
Patient discharged to [facility]. Transported via stretcher. Scripts sent in secure bag. Report called to nurse Nubia Henriquez at facility.

## 2019-04-16 NOTE — DISCHARGE SUMMARY
Hospital Medicine Discharge Summary    Patient: Ryan Byrd     Age: 53 y.o. Gender: female  : 1965   MRN: [id number]  Code Status: Full     Admit Date: 2019   Discharge Date: 2019    Disposition:  [address] [facility])    Condition at Discharge: Stable    Primary Care Provider: [address]    Admitting Physician: Mani Griffin MD  Discharge Physician: Martha Moncada MD       Discharge Diagnoses: Active Hospital Problems    Diagnosis Date Noted    Chronic distal aortic occlusion ([facility] [address] [I74.09] 2019    Severe sepsis ([facility] [address] [A41.9, R65.20] 2019    UTI (urinary tract infection) [N39.0] 2019    Seizure disorder ([facility] [address] [G40.909] 2019    Encephalopathy, metabolic [U01.66]     Remote history of stroke [facility]        Hospital Course:   53 y. o. female with PMHx of CVA w/ R hemiparesis, seizure disorder, SVT, HTN, Chronic Hematuria with prior Bladder Cancer, chronic randle, and Vasculopathy; working diagnosis of Giant Cell Arthritis vs. Atypical Takayasu. She has had multiple prolonged hospitalizations at  over the past few months. She was previously found to have numerous thrombosed vessels in her brain as well as in her subclavian and infrarenal AAA. 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 Followed by [facility] Rhematology and currently on chronic Prednisone and Eliquis. She was most recently discharged from [facility] on 3/23/19 after treatment for Sepsis, to [facility]. She presented to the ED on 19 with concerns for increased lethargy and diaphoresis. During ED evaluation, she was found to be tachycardic, which was controlled with cardizem gtt. Also during ED evaluation, she had witnessed seizure activity, which prompted transfer to [facility]. Assessment/Plan:    Severe sepsis, present on arrival. Possible UTI vs pneumonia  - marked tachycardia, leukocytosis on presentation  - lactate 3.3 initially, improved to 2.2 with IVF  - Clinically improved, with no further leukocytosis, afebrile, improved mentation  - CT abd/pel without source of infection noted. - Completed empiric 7 day course of Vanc/Zosyn. Seizure disorder  - last seizure while in ED.   - EEG shows diffuse encephalopathy. - continue vimpat, keppra, gabapentin  - neurology followed earlier in stay    Acute hypoxic respiratory failure 2/2 above  - Wean O2 as tolerated. - nebs PRN     Acute multifactorial encephalopathy with baseline vascular dementia  - sepsis vs post-ictal  - Mentation improved and back to baseline    Anemia: Progressive anemia with Hb decline from 9.4--> 6.9 since presentation. Anemia is chronic, with recent baseline around 8 at [facility]. Has had chronic hematuria requiring cysto/ablation recently. No current gross hematuria. No obvious GI bleeding. Has large sacral ulcer which could be responsible for slow blood loss as well. Occult Blood negative. Hb improved with transfusion of 1 unit PRBC. Fe studies low; completed IV Venofer daily x 3 days. Tachycardia: Hx of same. Unclear if SVT or severe sinus tachycardia at present. Resolved. Cardiology consulted and now signed off. Unspecified Vasculitis/Vasculopathy: She was previously found to have numerous thrombosed vessels in her brain as well as in her subclavian and infrarenal AAA. 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 Followed by  Rhematology and currently on chronic Eliquis and Prednisone with Bactrim prophylaxis. - Continue steroids, ASA, statin.    - Eliquis briefly held given progressive anemia but now resumed. Right Upper Extremity SVT: Likely 2/2 PIV. Already on [facility]. Continue Eliquis. Local care. Dysphagia  - continue nightly TF via PEG, tolerating well. Ongoing SLP evaluations; eventually approved to advance to PO dysphagia diet, with strict aspiration precautions and close supervision. Sacral Decubitus Ulcer: Stage 4 ulcer, currently under treatment by  Wound Care. Required multiple surgical debridements during hospitalization at [facility] 3/2019. Wound care consulted here. Continue local wound care measures, offloading, and prompt incontinence care. Exam:   /88   Pulse 100   Temp 98.2 °F (36.8 °C) (Oral)   Resp 18   Ht 5' 2\" (1.575 m) Comment: per CareEverywhere  Wt 101 lb 4.8 oz (45.9 kg)   LMP 07/22/2013   SpO2 94%   Breastfeeding? No   BMI 18.53 kg/m²   General appearance: Awake, no signs of distress. HEENT:  Normal cephalic, atraumatic without obvious deformity. Pupils equal, round, and reactive to light. Extra ocular muscles intact. Conjunctivae/corneas clear. Neck: Supple, with full range of motion. No jugular venous distention. Trachea midline. Respiratory:  Normal respiratory effort. Clear to auscultation, bilaterally without Rales/Wheezes/Rhonchi. Cardiovascular:  Regular rate and rhythm with normal S1/S2 without murmurs, rubs or gallops. Abdomen: Soft, non-tender, non-distended with normal bowel sounds. Musculoskeletal:  No clubbing, cyanosis or edema bilaterally. Skin: decub ulcer   Neurologic: b/l UE weakness with contractures, no voluntary movement in legs   Psychiatric:  alert and oriented, answers questions appropriately.    Capillary Refill: Brisk,< 3 seconds   Peripheral Pulses: +2 palpable, equal bilaterally       Patient Discharge Instructions:   Recommended Follow-up, Labs or Other Treatments After Discharge:    PT/OT as tolerated    Continue Tube Feeds    SLP recommends proceeding cautiously with Dysphagia II Mechanically altered diet with thin liquids, strict aspiration precautions, only allow pt to eat/drink when sitting fully upright, meds via PEG or crushed in puree. Continue Wound Care for Sacral Ulcer  Follow-up with [facility]  Follow-up with  Rheumatology for Vascultiis           Discharge Medications:   Discharge Medication List as of 4/16/2019  3:36 PM      START taking these medications    Details   gabapentin (NEURONTIN) 250 MG/5ML solution 4 mLs by Per G Tube route every 8 hours for 7 days. , Disp-473 mL, R-0Print      oxyCODONE (OXY-IR) 10 MG immediate release tablet 1 tablet by PEG Tube route every 4 hours as needed for Pain for up to 7 days. , Disp-12 tablet, R-0Print      sulfamethoxazole-trimethoprim (BACTRIM DS;SEPTRA DS) 800-160 MG per tablet 1 tablet by PEG Tube route three times a week, R-0DC to [facility]           Discharge Medication List as of 4/16/2019  3:36 PM      CONTINUE these medications which have CHANGED    Details   baclofen (LIORESAL) 20 MG tablet 1 tablet by Per G Tube route 3 times dailyDC to [facility]      ipratropium-albuterol (DUONEB) 0.5-2.5 (3) MG/3ML SOLN nebulizer solution Inhale 3 mLs into the lungs every 4 hours as needed for Shortness of Breath, Disp-360 mLDC to [facility]      melatonin 1 MG tablet 3 tablets by Per G Tube route nightlyDC to [facility]      sennosides-docusate sodium (SENOKOT-S) 8.6-50 MG tablet 1 tablet by Per G Tube route 2 times daily as needed for ConstipationDC to [facility]           Discharge Medication List as of 4/16/2019  3:36 PM      CONTINUE these medications which have NOT CHANGED    Details   Multiple Vitamins-Minerals (THERAPEUTIC MULTIVITAMIN-MINERALS) tablet 1 tablet by Per G Tube route dailyHistorical Med      predniSONE 5 MG/5ML solution 30 mg by Per G Tube route dailyHistorical Med      FLUoxetine (PROZAC) 20 MG/5ML solution by Per G Tube route daily Historical Med      folic acid (FOLVITE) 1 MG tablet 1 mg by Per G Tube route daily Historical Med      aspirin 81 MG chewable tablet Take 1 tablet by mouth daily, Disp-30 tablet, R-3Normal      atorvastatin (LIPITOR) 80 MG tablet Take 1 tablet by mouth nightly, Disp-30 tablet, R-0Print      esomeprazole Magnesium (NEXIUM) 40 MG PACK 40 mg by Per G Tube route dailyHistorical Med      levETIRAcetam (KEPPRA) 1000 MG tablet 1,000 mg 2 times dailyHistorical Med      vitamin C (ASCORBIC ACID) 500 MG tablet 500 mg by Per G Tube route 2 times dailyHistorical Med      tiZANidine (ZANAFLEX) 2 MG tablet 2 mg by Per G Tube route 3 times dailyHistorical Med      acetaminophen (TYLENOL) 325 MG tablet 650 mg by Per G Tube route every 6 hours as needed for PainHistorical Med      amantadine (SYMMETREL) 50 MG/5ML solution 100 mg by Per G Tube route every 12 hours Historical Med      apixaban (ELIQUIS) 5 MG TABS tablet 5 mg by Per G Tube route 2 times daily Historical Med      lacosamide (VIMPAT) 50 MG TABS tablet 100 mg by Per G Tube route 2 times daily.  Historical Med      OLANZapine (ZYPREXA) 2.5 MG tablet Take 2.5 mg by mouth every 12 hours Historical Med      QUEtiapine (SEROQUEL) 25 MG tablet 25 mg by Per G Tube route nightly Historical Med           Discharge Medication List as of 4/16/2019  3:36 PM      STOP taking these medications       morphine 10 MG/5ML solution Comments:   Reason for Stopping:         morphine 2 MG/ML injection Comments:   Reason for Stopping:         docusate sodium (COLACE) 100 MG capsule Comments:   Reason for Stopping:         gabapentin (NEURONTIN) 100 MG capsule Comments:   Reason for Stopping:         ergocalciferol (DRISDOL) 8000 UNIT/ML drops Comments:   Reason for Stopping:         methotrexate Sodium (RHEUMATREX) 50 MG/2ML chemo injection Comments:   Reason for Stopping:         sulfamethoxazole-trimethoprim (BACTRIM;SEPTRA) 200-40 MG/5ML suspension Comments:   Reason for Stopping:                 Significant Test Results    Xr Elbow Right (2 Views)    Result Date: 4/6/2019  EXAMINATION: 2 XRAY VIEWS OF THE RIGHT ELBOW 4/6/2019 11:01 am COMPARISON: 12/10/2010 HISTORY: ORDERING SYSTEM PROVIDED HISTORY: paIn and swelling TECHNOLOGIST PROVIDED HISTORY: Reason for exam:->paIn and swelling Ordering Physician Provided Reason for Exam: pain Acuity: Acute Type of Exam: Initial FINDINGS: The study is limited secondary to overlying support apparatus and external material, cloth. A catheter and IVC project over the medial aspect of the distal humerus. There is mild diffuse induration of the subcutaneous tissues. No definite subcutaneous emphysema is noted given the limitations of the study. Osseous structures demonstrate no acute osseous abnormality although study is limited by nonstandard positioning and overlying artifact. Evaluation of joint effusion is limited on the lateral view. Diffuse induration of the subcutaneous tissues which is nonspecific but could be seen with cellulitis or generalized edema of the right arm. No obvious fracture is noted; however, study is limited by nonstandard positioning and overlying artifact. If there is clinical concern for an acute fracture, repeat imaging with standard positioning and removal of artifact would be recommended. IV line is noted in place in the distal aspect of the medial portion of the right upper arm. Ct Head Wo Contrast    Result Date: 4/8/2019  EXAMINATION: CT OF THE HEAD WITHOUT CONTRAST  4/8/2019 5:29 pm TECHNIQUE: CT of the head was performed without the administration of intravenous contrast. Dose modulation, iterative reconstruction, and/or weight based adjustment of the mA/kV was utilized to reduce the radiation dose to as low as reasonably achievable. COMPARISON: 01/12/2019 HISTORY: ORDERING SYSTEM PROVIDED HISTORY: SEIZURE, NEW, 18-39 YO, NO TRAUMA TECHNOLOGIST PROVIDED HISTORY: Has a \"code stroke\" or \"stroke alert\" been called? ->No Ordering Physician Provided Reason for Exam: Seizure activity FINDINGS: BRAIN/VENTRICLES: The ventricles are midline.   Again identified is moderate abnormality. Peritoneum/Retroperitoneum: Again identified is an infrarenal abdominal aortic aneurysm measuring up to 3.1 cm. There is complete occlusion of the infrarenal abdominal aorta just below the level of the origin of the renal arteries. The common iliac arteries are also occluded Narrowing of the origin of the superior mesenteric and right renal arteries are again is again seen. Findings are similar prior study. Bones/Soft Tissues: No acute bony abnormality. There has been development of this soft tissue defect of the posterior lower right pelvic wall with ulceration of the skin; the soft tissue defect extends to abut the musculature and lower right sacrum. No subcutaneous fluid collection is identified. No bony erosion is identified. .     No acute intra-abdominopelvic abnormality. Interval development of right sacral decubitus ulceration. Infrarenal abdominal aortic aneurysm measuring up 3.1 cm as well as occlusion of the infrarenal abdominal aorta, similar to the prior study 02/18/2019. Follow-up recommendations for abdominal aortic aneurysm of the size are below. RECOMMENDATIONS: 3.1 cm AAA Recommend follow-up every 3 years. Reference: J Vasc Surg 2009 Oct;50(4 Suppl):S2-49. Xr Chest Portable    Result Date: 4/10/2019  EXAMINATION: SINGLE XRAY VIEW OF THE CHEST 4/10/2019 4:56 am COMPARISON: 04/06/2019 HISTORY: ORDERING SYSTEM PROVIDED HISTORY: respiratory distress TECHNOLOGIST PROVIDED HISTORY: Reason for exam:->respiratory distress Ordering Physician Provided Reason for Exam: respiratory distress Type of Exam: Subsequent/Follow-up FINDINGS: There is minimal atelectasis in the left base. The lungs are otherwise clear. The heart size is within normal limits. There is no evidence for pleural effusion or pneumothorax. No acute disease.      Xr Chest Portable    Result Date: 4/6/2019  EXAMINATION: SINGLE XRAY VIEW OF THE CHEST 4/6/2019 9:27 am COMPARISON: Prior studies including 02/18/2019 involving  the basilic vein. 2. There is no other evidence of deep or superficial venous thrombosis  involving the right upper extremity and the left internal jugular and  subclavian veins. Signature   ------------------------------------------------------------------  Electronically signed by Khalif Hendrickson MD (Interpreting  physician) on 04/08/2019 at 03:27 PM  ------------------------------------------------------------------  Patient Status:Routine. Study [facility] [facility]. Technical Quality:Adequate visualization.   - Results were reported to:Mary Hayes RN @ 8:58 am. Risk Factors History +---------+----------+-------------------------------------------------------+ ! Diagnosis! Date      ! Comments                                               ! +---------+----------+-------------------------------------------------------+ ! Other    !04/08/2019! Presented to ED 4/6/2019 and was admitted. Right arm   ! !         !          !edema noted during hospitalization. H/o CVA. ! +---------+----------+-------------------------------------------------------+   - The patient has a current/recent (within 1 year) tobacco history. Velocities are measured in cm/s ; Diameters are measured in mm Right UE Vein Measurements 2D Measurements +--------+----------+---------------+----------+ ! Location! Visualized! Compressibility! Thrombosis! +--------+----------+---------------+----------+ ! IJV     ! Yes       ! Yes            ! None      ! +--------+----------+---------------+----------+ ! SCV     ! Yes       ! Yes            ! None      ! +--------+----------+---------------+----------+ ! Axillary! Yes       ! Yes            ! None      ! +--------+----------+---------------+----------+ ! Brachial!Yes       ! Yes            ! None      ! +--------+----------+---------------+----------+ ! Radial  !Yes       ! Yes            ! None      ! +--------+----------+---------------+----------+ ! Ulnar   ! Yes !Yes            !None      ! +--------+----------+---------------+----------+ ! Basilic ! Yes       ! No             !Acute     ! +--------+----------+---------------+----------+ ! Cephalic! Yes       ! Yes            ! None      ! +--------+----------+---------------+----------+ Doppler Measurements +--------+------+------+ ! Location! Signal!Reflux! +--------+------+------+ ! IJV     ! Phasic!      ! +--------+------+------+ ! SCV     ! Phasic!      ! +--------+------+------+ ! Axillary! Phasic!      ! +--------+------+------+ Left UE Vein Measurements 2D Measurements +--------+----------+---------------+----------+ ! Location! Visualized! Compressibility! Thrombosis! +--------+----------+---------------+----------+ ! IJV     ! Yes       ! Yes            ! None      ! +--------+----------+---------------+----------+ ! SCV     ! Yes       ! Yes            ! None      ! +--------+----------+---------------+----------+ Doppler Measurements +--------+------+------+ ! Location! Signal!Reflux! +--------+------+------+ ! IJV     ! Phasic!      ! +--------+------+------+ ! SCV     ! Phasic!      ! +--------+------+------+    Ir Ultrasound Guidance Vascular Access    Result Date: 4/9/2019  EXAMINATION: LIMITED ULTRASOUND OF THE ARM FOR PICC ACCESS, 4/8/2019 TECHNIQUE: The PICC team used ultrasound and VPS guidance to place a PICC line. HISTORY: ORDERING SYSTEM PROVIDED HISTORY: limited access TECHNOLOGIST PROVIDED HISTORY: Reason for exam:->limited access Ordering Physician Provided Reason for Exam: vessels too small for PICC FINDINGS: Ultrasonographic examination of venous structures within the left arm was performed. Visualized venous structures are too small in caliber to facilitate placement of PICC line. Right upper extremity not able to be utilized due to history of blood clot. With this in mind, PICC line was not placed.      Unsuccessful attempt at placement of left upper extremity PICC line due to lack of visualization of vessel of sufficient

## 2019-04-16 NOTE — CARE COORDINATION
CASE MANAGEMENT DISCHARGE SUMMARY    DISCHARGE TO: [facility] /LONG TERM RESIDENT     TRANSPORTATION: [facility] 4 pm     NOTIFIED: PATIENT, Pt's daughter Pritesh Caldera, facility and RN     HENS: N-A Per facility no PASS OR Hens needed     PRECERTIFICATION OBTAINED: YES/VGT LTC     REPORT: NURSE TO CALL REPORT TO: # [phone]    SANIYA/AVS Faxed to facility     NURSE RESPONSIBLE FOR COMPLETION OF   E-CONTINUITY OF CARE (saniya) FORM, and RECONCILIATION OF MEDICATIONS,  NURSE TO ENSURE THAT ANY WRITTEN PRESCRIPTIONS AND A COPY OF THE PATIENTS CHART TO ACCOMPANY THEM TO FACILITY if not able to access electronic chart.

## 2019-04-27 ENCOUNTER — HOSPITAL ENCOUNTER (EMERGENCY)
Age: 54
Discharge: HOME | End: 2019-04-28
Attending: EMERGENCY MEDICINE
Payer: COMMERCIAL

## 2019-04-27 ENCOUNTER — APPOINTMENT (OUTPATIENT)
Dept: CT IMAGING | Age: 54
End: 2019-04-27
Payer: COMMERCIAL

## 2019-04-27 DIAGNOSIS — G89.4 CHRONIC PAIN SYNDROME: Primary | ICD-10-CM

## 2019-04-27 DIAGNOSIS — R00.0 TACHYCARDIA: ICD-10-CM

## 2019-04-27 LAB
A/G RATIO: 0.7 (ref 1.1–2.2)
ALBUMIN SERPL-MCNC: 3.1 G/DL (ref 3.4–5)
ALP BLD-CCNC: 170 U/L (ref 40–129)
ALT SERPL-CCNC: 54 U/L (ref 10–40)
ANION GAP SERPL CALCULATED.3IONS-SCNC: 16 MMOL/L (ref 3–16)
AST SERPL-CCNC: 45 U/L (ref 15–37)
BASOPHILS ABSOLUTE: 0.1 K/UL (ref 0–0.2)
BASOPHILS RELATIVE PERCENT: 0.4 %
BILIRUB SERPL-MCNC: <0.2 MG/DL (ref 0–1)
BUN BLDV-MCNC: 16 MG/DL (ref 7–20)
CALCIUM SERPL-MCNC: 9.5 MG/DL (ref 8.3–10.6)
CHLORIDE BLD-SCNC: 98 MMOL/L (ref 99–110)
CO2: 26 MMOL/L (ref 21–32)
CREAT SERPL-MCNC: 0.8 MG/DL (ref 0.6–1.1)
EOSINOPHILS ABSOLUTE: 0 K/UL (ref 0–0.6)
EOSINOPHILS RELATIVE PERCENT: 0 %
GFR AFRICAN AMERICAN: >60
GFR NON-AFRICAN AMERICAN: >60
GLOBULIN: 4.2 G/DL
GLUCOSE BLD-MCNC: 134 MG/DL (ref 70–99)
HCT VFR BLD CALC: 33.2 % (ref 36–48)
HEMOGLOBIN: 10.1 G/DL (ref 12–16)
LYMPHOCYTES ABSOLUTE: 1.2 K/UL (ref 1–5.1)
LYMPHOCYTES RELATIVE PERCENT: 5.7 %
MCH RBC QN AUTO: 23.2 PG (ref 26–34)
MCHC RBC AUTO-ENTMCNC: 30.3 G/DL (ref 31–36)
MCV RBC AUTO: 76.6 FL (ref 80–100)
MONOCYTES ABSOLUTE: 0.8 K/UL (ref 0–1.3)
MONOCYTES RELATIVE PERCENT: 3.9 %
NEUTROPHILS ABSOLUTE: 19 K/UL (ref 1.7–7.7)
NEUTROPHILS RELATIVE PERCENT: 90 %
PDW BLD-RTO: 24.2 % (ref 12.4–15.4)
PLATELET # BLD: 446 K/UL (ref 135–450)
PMV BLD AUTO: 8.7 FL (ref 5–10.5)
POTASSIUM SERPL-SCNC: 4.6 MMOL/L (ref 3.5–5.1)
RBC # BLD: 4.34 M/UL (ref 4–5.2)
SODIUM BLD-SCNC: 140 MMOL/L (ref 136–145)
TOTAL PROTEIN: 7.3 G/DL (ref 6.4–8.2)
TROPONIN: 0.15 NG/ML
WBC # BLD: 21.1 K/UL (ref 4–11)

## 2019-04-27 PROCEDURE — 2580000003 HC RX 258: Performed by: EMERGENCY MEDICINE

## 2019-04-27 PROCEDURE — 96374 THER/PROPH/DIAG INJ IV PUSH: CPT

## 2019-04-27 PROCEDURE — 99285 EMERGENCY DEPT VISIT HI MDM: CPT

## 2019-04-27 PROCEDURE — 6360000002 HC RX W HCPCS: Performed by: EMERGENCY MEDICINE

## 2019-04-27 PROCEDURE — 96361 HYDRATE IV INFUSION ADD-ON: CPT

## 2019-04-27 PROCEDURE — 6360000004 HC RX CONTRAST MEDICATION: Performed by: EMERGENCY MEDICINE

## 2019-04-27 PROCEDURE — 71260 CT THORAX DX C+: CPT

## 2019-04-27 PROCEDURE — 84484 ASSAY OF TROPONIN QUANT: CPT

## 2019-04-27 PROCEDURE — 85025 COMPLETE CBC W/AUTO DIFF WBC: CPT

## 2019-04-27 PROCEDURE — 93005 ELECTROCARDIOGRAM TRACING: CPT | Performed by: EMERGENCY MEDICINE

## 2019-04-27 PROCEDURE — 80053 COMPREHEN METABOLIC PANEL: CPT

## 2019-04-27 RX ORDER — MORPHINE SULFATE 10 MG/5ML
10 SOLUTION ORAL
COMMUNITY

## 2019-04-27 RX ORDER — OXYCODONE AND ACETAMINOPHEN 10; 325 MG/1; MG/1
1 TABLET ORAL EVERY 8 HOURS PRN
COMMUNITY

## 2019-04-27 RX ORDER — SODIUM CHLORIDE 9 MG/ML
1000 INJECTION, SOLUTION INTRAVENOUS ONCE
Status: COMPLETED | OUTPATIENT
Start: 2019-04-27 | End: 2019-04-28

## 2019-04-27 RX ORDER — HYDROMORPHONE HCL 110MG/55ML
1 PATIENT CONTROLLED ANALGESIA SYRINGE INTRAVENOUS ONCE
Status: COMPLETED | OUTPATIENT
Start: 2019-04-27 | End: 2019-04-27

## 2019-04-27 RX ADMIN — HYDROMORPHONE HYDROCHLORIDE 1 MG: 2 INJECTION INTRAMUSCULAR; INTRAVENOUS; SUBCUTANEOUS at 22:31

## 2019-04-27 RX ADMIN — SODIUM CHLORIDE 1000 ML: 9 INJECTION, SOLUTION INTRAVENOUS at 21:54

## 2019-04-27 RX ADMIN — IOPAMIDOL 85 ML: 755 INJECTION, SOLUTION INTRAVENOUS at 23:45

## 2019-04-27 ASSESSMENT — PAIN SCALES - GENERAL: PAINLEVEL_OUTOF10: 10

## 2019-04-28 VITALS
RESPIRATION RATE: 12 BRPM | HEART RATE: 137 BPM | TEMPERATURE: 97.9 F | OXYGEN SATURATION: 97 % | HEIGHT: 64 IN | SYSTOLIC BLOOD PRESSURE: 131 MMHG | BODY MASS INDEX: 20.49 KG/M2 | WEIGHT: 120 LBS | DIASTOLIC BLOOD PRESSURE: 97 MMHG

## 2019-04-28 LAB
EKG ATRIAL RATE: 145 BPM
EKG DIAGNOSIS: NORMAL
EKG P AXIS: 76 DEGREES
EKG P-R INTERVAL: 120 MS
EKG Q-T INTERVAL: 284 MS
EKG QRS DURATION: 86 MS
EKG QTC CALCULATION (BAZETT): 441 MS
EKG R AXIS: 96 DEGREES
EKG T AXIS: 88 DEGREES
EKG VENTRICULAR RATE: 145 BPM
TROPONIN: 0.14 NG/ML

## 2019-04-28 PROCEDURE — 93010 ELECTROCARDIOGRAM REPORT: CPT | Performed by: INTERNAL MEDICINE

## 2019-04-28 PROCEDURE — 6360000002 HC RX W HCPCS: Performed by: EMERGENCY MEDICINE

## 2019-04-28 PROCEDURE — 96376 TX/PRO/DX INJ SAME DRUG ADON: CPT

## 2019-04-28 PROCEDURE — 84484 ASSAY OF TROPONIN QUANT: CPT

## 2019-04-28 RX ORDER — HYDROMORPHONE HCL 110MG/55ML
1 PATIENT CONTROLLED ANALGESIA SYRINGE INTRAVENOUS ONCE
Status: COMPLETED | OUTPATIENT
Start: 2019-04-28 | End: 2019-04-28

## 2019-04-28 RX ADMIN — HYDROMORPHONE HYDROCHLORIDE 1 MG: 2 INJECTION INTRAMUSCULAR; INTRAVENOUS; SUBCUTANEOUS at 01:47

## 2019-04-28 ASSESSMENT — PAIN DESCRIPTION - PAIN TYPE: TYPE: CHRONIC PAIN

## 2019-04-28 ASSESSMENT — PAIN SCALES - GENERAL
PAINLEVEL_OUTOF10: 7
PAINLEVEL_OUTOF10: 6

## 2019-04-28 NOTE — ED NOTES
Pt. Is alert and oriented and aware of plan of care at this time.       Shannon Vega, TASHA  04/28/19 [id number]

## 2019-04-28 NOTE — ED NOTES
Pt being returned to [facility].        Abimael Lopez, RN  04/28/19 [address], RN  04/28/19 [id number]

## 2019-04-28 NOTE — ED PROVIDER NOTES
Triage Chief Complaint:    Tachycardia    [place]:  Adrian Dickey is a 53 y.o. female that presents to the emergency Department with complaints of having overall bodyaches. The patient has a resting heart rate that is normally elevated, but staff at her facility felt that it was elevated too high. The patient denies chest pain or shortness breath. Patient denies nausea or vomiting. Patient states that her pain level is at its baseline. According to EMS, they were not exactly sure why they're being called out, because the patient's symptoms did not seem any worse than her normal.  Pain level at present time as an 8/10    ROS:  At least 10 systems reviewed and otherwise acutely negative except as in the [address] Past Medical History:   Diagnosis Date    Anorexia     Arthritis     RA    Bulging lumbar disc     C. difficile diarrhea 09/30/2017    Cancer ([address]     bladder CA 2014    Cerebral artery occlusion with cerebral infarction ([address]     x3 2017    Chronic kidney disease     bladder ca    Hypertriglyceridemia 10/5/2017    Post traumatic stress disorder     Seasonal allergies     Seizure disorder ([address] 4/7/2019    UTI (urinary tract infection) 4/7/2019     Past Surgical History:   Procedure Laterality Date    BLADDER SURGERY      [place]    CYSTOSCOPY      transurethral resection bladder tumor    CYSTOSCOPY  6-13-13    TRANSURETHRAL RESECTION BLADDER TUMOR    CYSTOSCOPY  11-    Cystoscopy, bladder biopsy and fulgeration    TUBAL LIGATION       Family History   Problem Relation Age of Onset    Stroke Mother     Heart Disease Father         pacemaker     Social History     Socioeconomic History    Marital status:       Spouse name: Not on file    Number of children: Not on file    Years of education: Not on file    Highest education level: Not on file   Occupational History    Not on file   Social Needs    Financial resource strain: Not on file    Food insecurity:     Worry: Not on file     Inability: Not on file    Transportation needs:     Medical: Not on file     Non-medical: Not on file   Tobacco Use    Smoking status: Current Every Day Smoker     Packs/day: 0.50     Years: 35.00     Pack years: 17.50     Types: Cigarettes    Smokeless tobacco: Never Used   Substance and Sexual Activity    Alcohol use: No    Drug use: No    Sexual activity: Not on file   Lifestyle    Physical activity:     Days per week: Not on file     Minutes per session: Not on file    Stress: Not on file   Relationships    Social connections:     Talks on phone: Not on file     Gets together: Not on file     Attends Judaism service: Not on file     Active member of club or organization: Not on file     Attends meetings of clubs or organizations: Not on file     Relationship status: Not on file    Intimate partner violence:     Fear of current or ex partner: Not on file     Emotionally abused: Not on file     Physically abused: Not on file     Forced sexual activity: Not on file   Other Topics Concern    Not on file   Social History Narrative    Not on file     No current facility-administered medications for this encounter. Current Outpatient Medications   Medication Sig Dispense Refill    oxyCODONE-acetaminophen (PERCOCET)  MG per tablet Take 1 tablet by mouth every 8 hours as needed for Pain.  morphine 10 MG/5ML solution Take 10 mg by mouth every 2 hours as needed for Pain.  baclofen (LIORESAL) 20 MG tablet 1 tablet by Per G Tube route 3 times daily      gabapentin (NEURONTIN) 250 MG/5ML solution 4 mLs by Per G Tube route every 8 hours for 7 days.  473 mL 0    ipratropium-albuterol (DUONEB) 0.5-2.5 (3) MG/3ML SOLN nebulizer solution Inhale 3 mLs into the lungs every 4 hours as needed for Shortness of Breath 360 mL     sennosides-docusate sodium (SENOKOT-S) 8.6-50 MG tablet 1 tablet by Per G Tube route 2 times daily as needed for Constipation      sulfamethoxazole-trimethoprim (BACTRIM DS;SEPTRA DS) 800-160 MG per tablet 1 tablet by PEG Tube route three times a week  0    esomeprazole Magnesium (NEXIUM) 40 MG PACK 40 mg by Per G Tube route daily      Multiple Vitamins-Minerals (THERAPEUTIC MULTIVITAMIN-MINERALS) tablet 1 tablet by Per G Tube route daily      predniSONE 5 MG/5ML solution 30 mg by Per G Tube route daily      levETIRAcetam (KEPPRA) 1000 MG tablet 1,000 mg 2 times daily      vitamin C (ASCORBIC ACID) 500 MG tablet 500 mg by Per G Tube route 2 times daily      tiZANidine (ZANAFLEX) 2 MG tablet 2 mg by Per G Tube route 3 times daily      acetaminophen (TYLENOL) 325 MG tablet 650 mg by Per G Tube route every 6 hours as needed for Pain      amantadine (SYMMETREL) 50 MG/5ML solution 100 mg by Per G Tube route every 12 hours       FLUoxetine (PROZAC) 20 MG/5ML solution by Per G Tube route daily       lacosamide (VIMPAT) 50 MG TABS tablet 100 mg by Per G Tube route 2 times daily.  OLANZapine (ZYPREXA) 2.5 MG tablet Take 2.5 mg by mouth every 12 hours       QUEtiapine (SEROQUEL) 25 MG tablet 25 mg by Per G Tube route nightly       aspirin 81 MG chewable tablet Take 1 tablet by mouth daily (Patient taking differently: 81 mg by Per G Tube route daily ) 30 tablet 3     Allergies   Allergen Reactions    Acetaminophen     Ativan [Lorazepam] Swelling    Codeine Hives    Darvocet [Propoxyphene N-Acetaminophen]     Methadone     Ultram [Tramadol] Swelling    Lodine [Etodolac] Rash       Nursing Notes Reviewed    Physical Exam:  ED Triage Vitals   BP Temp Temp src Pulse Resp SpO2 Height Weight   04/27/19 2127 04/27/19 2123 -- 04/27/19 2123 04/27/19 2123 04/27/19 2123 04/27/19 2123 04/27/19 2123   (!) 156/138 97.9 °F (36.6 °C)  146 18 99 % 5' 4\" (1.626 m) 120 lb (54.4 kg)     GENERAL APPEARANCE: Awake and alert. Cooperative. No acute distress. HEAD:Normocephalic. Atraumatic. EYES: EOM's grossly intact. Sclera anicteric.   ENT: Mucous membranes are moist. Tolerates saliva. No trismus. NECK: Supple. No meningismus. Trachea midline. HEART: Tachycardia without murmur  LUNGS: Respirations unlabored. CTAB  ABDOMEN: Soft. Non-tender. No guarding or rebound. EXTREMITIES: No acute deformities. SKIN: Warm and dry. NEUROLOGICAL: No gross facial drooping. Moves all 4 extremities spontaneously.     Physical Exam    I have reviewed and interpreted all of the currently availablelab results from this visit (if applicable):  Results for orders placed or performed during the hospital encounter of 04/27/19   CBC auto differential   Result Value Ref Range    WBC 21.1 (H) 4.0 - 11.0 K/uL    RBC 4.34 4.00 - 5.20 M/uL    Hemoglobin 10.1 (L) 12.0 - 16.0 g/dL    Hematocrit 33.2 (L) 36.0 - 48.0 %    MCV 76.6 (L) 80.0 - 100.0 fL    MCH 23.2 (L) 26.0 - 34.0 pg    MCHC 30.3 (L) 31.0 - 36.0 g/dL    RDW 24.2 (H) 12.4 - 15.4 %    Platelets 893 130 - 938 K/uL    MPV 8.7 5.0 - 10.5 fL    Neutrophils % 90.0 %    Lymphocytes % 5.7 %    Monocytes % 3.9 %    Eosinophils % 0.0 %    Basophils % 0.4 %    Neutrophils # 19.0 (H) 1.7 - 7.7 K/uL    Lymphocytes # 1.2 1.0 - 5.1 K/uL    Monocytes # 0.8 0.0 - 1.3 K/uL    Eosinophils # 0.0 0.0 - 0.6 K/uL    Basophils # 0.1 0.0 - 0.2 K/uL   Comprehensive metabolic panel   Result Value Ref Range    Sodium 140 136 - 145 mmol/L    Potassium 4.6 3.5 - 5.1 mmol/L    Chloride 98 (L) 99 - 110 mmol/L    CO2 26 21 - 32 mmol/L    Anion Gap 16 3 - 16    Glucose 134 (H) 70 - 99 mg/dL    BUN 16 7 - 20 mg/dL    CREATININE 0.8 0.6 - 1.1 mg/dL    GFR Non-African American >60 >60    GFR African American >60 >60    Calcium 9.5 8.3 - 10.6 mg/dL    Total Protein 7.3 6.4 - 8.2 g/dL    Alb 3.1 (L) 3.4 - 5.0 g/dL    Albumin/Globulin Ratio 0.7 (L) 1.1 - 2.2    Total Bilirubin <0.2 0.0 - 1.0 mg/dL    Alkaline Phosphatase 170 (H) 40 - 129 U/L    ALT 54 (H) 10 - 40 U/L    AST 45 (H) 15 - 37 U/L    Globulin 4.2 g/dL   Troponin   Result Value Ref Range    Troponin 0.15 involving the   cervical vasculature, dedicated CTA neck or MRA neck may be obtained. Mildly in prominent mediastinal lymph nodes are nonspecific. These may be   reactive. Follow-up chest CT in 3-6 month for reassessment. RECOMMENDATIONS:   Follow-up chest CT in 3-6 months. EKG (if obtained): (All EKG's are interpreted by myself in theabsence of a cardiologist)  Sinus tachycardia, normal QRS, no STEMI. Procedures    MDM:  After my initial evaluation, I do feel that the patient will be her workup including blood work, and EKG. Patient also had a troponin level done. The patient's troponin was slightly bumped at 0.15. The patient's EKG shows a sinus tachycardia with a rate of approximately 150 beats a minute. This definitely not atrial fibrillation. The patient had an elevated white blood cell count, and I did make the decision that I would send the patient for CT scan of the chest to rule out the possibility of either PE, or pneumonia. Patient's CT scan did not show any signs of pneumonia, does not show any signs of a PE, but there are multiple other chronic abnormalities on the CT scan itself. Patient's troponin will be repeated, and the patient did respond quite well to fluids and pain medication. Patient's heart rate had dropped down as low as 125 during her stay here in the emergency department. Patient's second troponin was 0.14, which was lower. This point I feel the patient can be safely discharged home. Patient was markedly improved with her pain after the second shot of Dilaudid. Clinical Impression:  1. Chronic pain syndrome    2.  Tachycardia      (Please note that portions of this note Natasha Lyndony been completed with a voice recognition program. Efforts were made to edit the dictations but occasionally words are mis-transcribed.)    MD Hernando Israel MD  04/28/19 0420

## 2019-05-07 PROBLEM — N39.0 UTI (URINARY TRACT INFECTION): Status: RESOLVED | Noted: 2019-04-07 | Resolved: 2019-05-07
